# Patient Record
Sex: MALE | Race: WHITE | NOT HISPANIC OR LATINO | Employment: UNEMPLOYED | URBAN - METROPOLITAN AREA
[De-identification: names, ages, dates, MRNs, and addresses within clinical notes are randomized per-mention and may not be internally consistent; named-entity substitution may affect disease eponyms.]

---

## 2023-06-28 ENCOUNTER — OFFICE VISIT (OUTPATIENT)
Dept: PRIMARY CARE | Facility: CLINIC | Age: 33
End: 2023-06-28
Payer: COMMERCIAL

## 2023-06-28 DIAGNOSIS — E87.1 HYPONATREMIA: Primary | ICD-10-CM

## 2023-06-28 PROCEDURE — 99213 OFFICE O/P EST LOW 20 MIN: CPT | Performed by: STUDENT IN AN ORGANIZED HEALTH CARE EDUCATION/TRAINING PROGRAM

## 2023-06-28 PROCEDURE — 84300 ASSAY OF URINE SODIUM: CPT

## 2023-06-28 PROCEDURE — 82570 ASSAY OF URINE CREATININE: CPT

## 2023-06-28 PROCEDURE — 80069 RENAL FUNCTION PANEL: CPT

## 2023-06-28 PROCEDURE — 81003 URINALYSIS AUTO W/O SCOPE: CPT

## 2023-06-28 NOTE — PROGRESS NOTES
Subjective   Patient ID: Hussain Villafana is a 33 y.o. male with bipolar I disorder and history of ALL who presents to [facility].  HPI  Concerns today:  #Hyponatremia, hypokalemia   -Seen on most recent labs on 6/26-- Na of 127, K of 3  -Drinks a lot of water, takes supplements including preworkout and creatine   -Denies confusion, muscle cramping    #Recent dizzy episode  -On Monday night, he felt dizzy, short of breath, and had worsening chest pain  -EMS found high blood pressure 160 systolic--> 148 on repeat  -EKG normal    Chronic issues:  #Bipolar  -olanzapine 15mg qhs   -risperdal 1mg at hs  -topiramate 200mg at hs  -trazodone 100mg at hs/needs to use only about 2x/week- every once in a while  -Yany Raphael has been psychiatrist, states that she is leaving so will need new one     #History of ALL  -Diagnosed at age 6  - End of therapy December 05, 2000 treated with Standard Risk - [facility] [place] 1952 (chemo included anthracycline)  -Recently seen in survivorship clinic for first time since high school on 6/21/23    #Chest pain   -Echo scheduled for 7/12/23  -Chronic chest pain for 13 years  -Worse recently  -Happens 3 times a week for a couple hours  -Located in left side of chest, feels like a sharp knife    Social history:  Eats healthy   Drinks 2 gallons of water a day  Works out 5 days a week- boxing, runs, and lifts  Was taking creatine supplement starting 3-4 weeks ago, stopped a couple days ago  400mg of caffeine a day (preworkout) 2 cups of coffee in the morning  Uses nicotine pouch-- thinking about quitting, previously smoked cigarettes  Working at [employer]   Recovering alcoholic   No drug use    Family history:  -Father with HTN and HLD    Objective   Visit Vitals  /88   Pulse 55   Ht 1.829 m (6')   Wt 84.4 kg (186 lb)   SpO2 100%   BMI 25.23 kg/m²   Smoking Status Former   BSA 2.07 m²      Physical Exam  General: Well appearing fit young man, in no acute distress  HEENT: EOMI,  PERRL, nares patent without congestion, MMM  CV: RRR, no murmurs, no chest wall tenderness to palpation  Resp: Lungs CTAB, normal work of breathing  GI: Soft, nondistended, nontender, BS+   Ext: No lower ext swelling  Skin: Warm, dry, no rashes  Neuro: Awake, alert, oriented x3, moving all 4 extremities, nonfocal, normal gait, ambulates without assistance  Psych: Appropriate, blunted    Assessment/Plan   Hussain Villafana is a 32 y.o. male with bipolar I disorder, chronic chest pain, and history of ALL who presents to [facility]. He has had recent electrolyte abnormalities likely due to polydipsia. Discussed cutting down water intake from 2 gallons to 1 gallon a day. Will recheck electrolytes today. He is following with onc- cardiology for chronic chest pain. Plan for follow up in 2-3 weeks.    Problem List Items Addressed This Visit    None  Visit Diagnoses       Hyponatremia    -  Primary    Relevant Orders    Renal function panel (Completed)    Urinalysis with Reflex Microscopic (Completed)    Sodium, urine, random (Completed)                 Grace Bui MD MPH

## 2023-06-28 NOTE — PATIENT INSTRUCTIONS
Thank you for coming today.    Please get your blood work and urine studies done.    Please get a blood pressure cuff that goes over your bicep.    I will see you in 2-3 weeks!!

## 2023-06-29 LAB
ALBUMIN (G/DL) IN SER/PLAS: 5 G/DL (ref 3.4–5)
ANION GAP IN SER/PLAS: 14 MMOL/L (ref 10–20)
APPEARANCE, URINE: CLEAR
BILIRUBIN, URINE: NEGATIVE
BLOOD, URINE: NEGATIVE
CALCIUM (MG/DL) IN SER/PLAS: 10.3 MG/DL (ref 8.6–10.6)
CARBON DIOXIDE, TOTAL (MMOL/L) IN SER/PLAS: 25 MMOL/L (ref 21–32)
CHLORIDE (MMOL/L) IN SER/PLAS: 100 MMOL/L (ref 98–107)
COLOR, URINE: ABNORMAL
CREATININE (MG/DL) IN SER/PLAS: 1.11 MG/DL (ref 0.5–1.3)
CREATININE (MG/DL) IN URINE: 13.2 MG/DL (ref 20–370)
GFR MALE: 89 ML/MIN/1.73M2
GLUCOSE (MG/DL) IN SER/PLAS: 95 MG/DL (ref 74–99)
GLUCOSE, URINE: NEGATIVE MG/DL
KETONES, URINE: NEGATIVE MG/DL
LEUKOCYTE ESTERASE, URINE: NEGATIVE
NITRITE, URINE: NEGATIVE
PH, URINE: 8 (ref 5–8)
PHOSPHATE (MG/DL) IN SER/PLAS: 3.1 MG/DL (ref 2.5–4.9)
POTASSIUM (MMOL/L) IN SER/PLAS: 4 MMOL/L (ref 3.5–5.3)
PROTEIN, URINE: NEGATIVE MG/DL
SODIUM (MMOL/L) IN SER/PLAS: 135 MMOL/L (ref 136–145)
SODIUM URINE RANDOM: 17 MMOL/L
SODIUM/CREATININE (MMOL/G) IN URINE: 129 MMOL/G CREAT
SPECIFIC GRAVITY, URINE: 1 (ref 1–1.03)
UREA NITROGEN (MG/DL) IN SER/PLAS: 7 MG/DL (ref 6–23)
UROBILINOGEN, URINE: <2 MG/DL (ref 0–1.9)

## 2023-07-07 PROBLEM — R06.09 DYSPNEA ON EXERTION: Status: RESOLVED | Noted: 2023-07-07 | Resolved: 2023-07-07

## 2023-07-07 PROBLEM — R10.32 CONTINUOUS LLQ ABDOMINAL PAIN: Status: RESOLVED | Noted: 2023-07-07 | Resolved: 2023-07-07

## 2023-07-07 PROBLEM — C91.00 ALL (ACUTE LYMPHOBLASTIC LEUKEMIA) (MULTI): Status: ACTIVE | Noted: 2022-07-05

## 2023-07-07 PROBLEM — F17.200 NICOTINE DEPENDENCE: Status: ACTIVE | Noted: 2018-03-07

## 2023-07-07 PROBLEM — L72.0 EPIDERMOID CYST OF NECK: Status: RESOLVED | Noted: 2022-07-05 | Resolved: 2023-07-07

## 2023-07-07 PROBLEM — F31.70: Status: ACTIVE | Noted: 2018-03-07

## 2023-07-07 PROBLEM — Q66.70 CAVUS DEFORMITY OF FOOT: Status: ACTIVE | Noted: 2022-07-05

## 2023-07-07 PROBLEM — F51.04 CHRONIC INSOMNIA: Status: ACTIVE | Noted: 2022-07-10

## 2023-07-07 PROBLEM — R07.89 ATYPICAL CHEST PAIN: Status: ACTIVE | Noted: 2018-01-26

## 2023-07-07 PROBLEM — N41.1 CHRONIC PROSTATITIS: Status: RESOLVED | Noted: 2019-07-12 | Resolved: 2023-07-07

## 2023-07-07 PROBLEM — K46.9 ABDOMINAL HERNIA: Status: RESOLVED | Noted: 2022-07-05 | Resolved: 2023-07-07

## 2023-07-07 PROBLEM — R91.1 SOLITARY PULMONARY NODULE: Status: ACTIVE | Noted: 2022-07-05

## 2023-07-07 PROBLEM — K46.0 INCARCERATED HERNIA: Status: RESOLVED | Noted: 2023-07-07 | Resolved: 2023-07-07

## 2023-07-07 PROBLEM — F41.1 GENERALIZED ANXIETY DISORDER: Status: RESOLVED | Noted: 2018-03-05 | Resolved: 2023-07-07

## 2023-07-07 RX ORDER — TRAZODONE HYDROCHLORIDE 100 MG/1
1 TABLET ORAL DAILY PRN
COMMUNITY
Start: 2018-08-24 | End: 2024-01-30 | Stop reason: HOSPADM

## 2023-07-07 RX ORDER — EPINEPHRINE 0.22MG
100 AEROSOL WITH ADAPTER (ML) INHALATION
COMMUNITY
End: 2024-01-30 | Stop reason: HOSPADM

## 2023-07-07 RX ORDER — RISPERIDONE 1 MG/1
1 TABLET ORAL NIGHTLY
COMMUNITY
End: 2024-01-30 | Stop reason: HOSPADM

## 2023-07-07 RX ORDER — TOPIRAMATE 200 MG/1
200 TABLET ORAL DAILY
COMMUNITY
End: 2024-04-24 | Stop reason: SDUPTHER

## 2023-07-07 RX ORDER — METOPROLOL SUCCINATE 25 MG/1
25 TABLET, EXTENDED RELEASE ORAL 2 TIMES DAILY
COMMUNITY
Start: 2023-06-14 | End: 2024-01-30 | Stop reason: HOSPADM

## 2023-07-07 RX ORDER — OLANZAPINE 15 MG/1
15 TABLET ORAL NIGHTLY
COMMUNITY
End: 2024-02-15 | Stop reason: WASHOUT

## 2023-07-07 RX ORDER — ACETAMINOPHEN 500 MG
5000 TABLET ORAL
COMMUNITY
End: 2024-01-30 | Stop reason: HOSPADM

## 2023-07-08 VITALS
SYSTOLIC BLOOD PRESSURE: 126 MMHG | HEIGHT: 72 IN | HEART RATE: 55 BPM | DIASTOLIC BLOOD PRESSURE: 88 MMHG | BODY MASS INDEX: 25.19 KG/M2 | OXYGEN SATURATION: 100 % | WEIGHT: 186 LBS

## 2023-07-11 ENCOUNTER — PATIENT OUTREACH (OUTPATIENT)
Dept: PRIMARY CARE | Facility: CLINIC | Age: 33
End: 2023-07-11
Payer: COMMERCIAL

## 2023-07-18 ENCOUNTER — OFFICE VISIT (OUTPATIENT)
Dept: PRIMARY CARE | Facility: CLINIC | Age: 33
End: 2023-07-18
Payer: COMMERCIAL

## 2023-07-18 VITALS
OXYGEN SATURATION: 100 % | HEART RATE: 63 BPM | SYSTOLIC BLOOD PRESSURE: 128 MMHG | WEIGHT: 184 LBS | DIASTOLIC BLOOD PRESSURE: 80 MMHG | BODY MASS INDEX: 24.95 KG/M2

## 2023-07-18 DIAGNOSIS — E87.1 HYPONATREMIA: Primary | ICD-10-CM

## 2023-07-18 LAB
ANION GAP IN SER/PLAS: 15 MMOL/L (ref 10–20)
C REACTIVE PROTEIN (MG/L) IN SER/PLAS BY HIGH SENSIT: 0.4 MG/L
CALCIUM (MG/DL) IN SER/PLAS: 10 MG/DL (ref 8.6–10.6)
CARBON DIOXIDE, TOTAL (MMOL/L) IN SER/PLAS: 23 MMOL/L (ref 21–32)
CHLORIDE (MMOL/L) IN SER/PLAS: 105 MMOL/L (ref 98–107)
CHOLESTEROL (MG/DL) IN SER/PLAS: 188 MG/DL (ref 0–199)
CHOLESTEROL IN HDL (MG/DL) IN SER/PLAS: 78.1 MG/DL
CHOLESTEROL/HDL RATIO: 2.4
CREATININE (MG/DL) IN SER/PLAS: 1.08 MG/DL (ref 0.5–1.3)
GFR MALE: >90 ML/MIN/1.73M2
GLUCOSE (MG/DL) IN SER/PLAS: 98 MG/DL (ref 74–99)
LDL: 99 MG/DL (ref 0–99)
NATRIURETIC PEPTIDE B (PG/ML) IN SER/PLAS: 8 PG/ML (ref 0–99)
POTASSIUM (MMOL/L) IN SER/PLAS: 4.6 MMOL/L (ref 3.5–5.3)
RHEUMATOID FACTOR (IU/ML) IN SERUM OR PLASMA: <10 IU/ML (ref 0–15)
SEDIMENTATION RATE, ERYTHROCYTE: NORMAL
SODIUM (MMOL/L) IN SER/PLAS: 138 MMOL/L (ref 136–145)
TRIGLYCERIDE (MG/DL) IN SER/PLAS: 55 MG/DL (ref 0–149)
UREA NITROGEN (MG/DL) IN SER/PLAS: 10 MG/DL (ref 6–23)
VLDL: 11 MG/DL (ref 0–40)

## 2023-07-18 PROCEDURE — 3008F BODY MASS INDEX DOCD: CPT | Performed by: STUDENT IN AN ORGANIZED HEALTH CARE EDUCATION/TRAINING PROGRAM

## 2023-07-18 PROCEDURE — 99213 OFFICE O/P EST LOW 20 MIN: CPT | Performed by: STUDENT IN AN ORGANIZED HEALTH CARE EDUCATION/TRAINING PROGRAM

## 2023-07-18 NOTE — PROGRESS NOTES
Subjective   Patient ID: Hussain Villafana is a 33 y.o. male with bipolar I disorder and history of ALL who presents to Follow-up.  HPI  Concerns today;  #Recent admission   Admitted to [facility] 7/7-7/8  Normal work-up aside from hyponatremia   Told he was drinking too much water  Drinking two gallons a day    #Chest pain  Continues to have 5-6/10 pain in chest after activity, happened with boxing yesterday  Saw onco-cardiologist Zoraida Cobian on 7/12/23  [facility] echo done 7/12/23: EF normal 60-65%, GLS has decreased compared to prior study- currently 16.5%    Chronic issues:  #Bipolar  -olanzapine 15mg qhs   -risperdal 1mg at hs  -topiramate 200mg at hs  -trazodone 100mg at hs/needs to use only about 2x/week- every once in a while  -Yany Raphael has been psychiatrist, states that she is leaving so will need new one     #History of ALL  -Diagnosed at age 6  - End of therapy December 05, 2000 treated with Standard Risk - [facility] 1952 (chemo included anthracycline)  -Recently seen in survivorship clinic for first time since high school on 6/21/23    #Chest pain   -Chronic chest pain for 13 years  -Worse recently  -Happens 3 times a week for a couple hours  -Located in left side of chest, feels like a sharp knife    Social history:  Eats healthy   Drinks 2 gallons of water a day  Works out 5 days a week- boxing, runs, and lifts  400mg of caffeine a day (preworkout) 2 cups of coffee in the morning-- has stopped preworkout recently   Uses nicotine pouch-- thinking about quitting, previously smoked cigarettes  Working at [employer]   Recovering alcoholic   No drug use    Family history:  -Father with HTN and HLD    Objective   Visit Vitals  /80   Pulse 63   Wt 83.5 kg (184 lb)   SpO2 100%   BMI 24.95 kg/m²   Smoking Status Former   BSA 2.06 m²      Physical Exam  General: Well appearing fit young man, in no acute distress  HEENT: EOMI, PERRL, nares patent without congestion, MMM  CV: RRR, no  murmurs, no chest wall tenderness to palpation  Resp: Lungs CTAB, normal work of breathing  GI: Soft, nondistended, nontender, BS+   Ext: No lower ext swelling  Skin: Warm, dry, no rashes  Neuro: Awake, alert, oriented x3, moving all 4 extremities, nonfocal, normal gait, ambulates without assistance  Psych: Appropriate, blunted    Assessment/Plan   Hussain Villafana is a 32 y.o. male with bipolar I disorder, chronic chest pain, and history of ALL who presents for follow-up. He was recently admitted to [facility] overnight for chest pain, diaphoresis, weakness, found to be hyponatremic due to polydipsia. We discussed cutting water intake down from 2 gallons to 1 gallon a day and putting liquid IV or a similar product split into 2 glasses of his water everyday. Discussed trialing tums for chest pain next time just to see if there is any component of reflux causing the pain. Patient already has lab orders from cardiology that I will follow up on as well.    Problem List Items Addressed This Visit    None  Visit Diagnoses       Hyponatremia    -  Primary               Grace Bui MD MPH

## 2023-07-18 NOTE — PATIENT INSTRUCTIONS
Thank you for coming today!    Please limit water intake to one gallon a day.     Please drink 1-2 beverages a day with about 500mg of sodium.    I will follow up on your labs, and we can discuss follow-up.    Please try tums for chest pain.

## 2023-07-24 ENCOUNTER — OFFICE VISIT (OUTPATIENT)
Dept: PRIMARY CARE | Facility: CLINIC | Age: 33
End: 2023-07-24
Payer: COMMERCIAL

## 2023-07-24 VITALS
WEIGHT: 189 LBS | SYSTOLIC BLOOD PRESSURE: 128 MMHG | DIASTOLIC BLOOD PRESSURE: 80 MMHG | OXYGEN SATURATION: 97 % | HEART RATE: 66 BPM | BODY MASS INDEX: 25.63 KG/M2 | TEMPERATURE: 97.9 F

## 2023-07-24 DIAGNOSIS — R11.2 NAUSEA AND VOMITING, UNSPECIFIED VOMITING TYPE: Primary | ICD-10-CM

## 2023-07-24 LAB
ALANINE AMINOTRANSFERASE (SGPT) (U/L) IN SER/PLAS: 31 U/L (ref 10–52)
ALBUMIN (G/DL) IN SER/PLAS: 4.7 G/DL (ref 3.4–5)
ALKALINE PHOSPHATASE (U/L) IN SER/PLAS: 105 U/L (ref 33–120)
ANION GAP IN SER/PLAS: 11 MMOL/L (ref 10–20)
ASPARTATE AMINOTRANSFERASE (SGOT) (U/L) IN SER/PLAS: 37 U/L (ref 9–39)
BASOPHILS (10*3/UL) IN BLOOD BY AUTOMATED COUNT: 0.03 X10E9/L (ref 0–0.1)
BASOPHILS/100 LEUKOCYTES IN BLOOD BY AUTOMATED COUNT: 0.4 % (ref 0–2)
BILIRUBIN TOTAL (MG/DL) IN SER/PLAS: 0.4 MG/DL (ref 0–1.2)
CALCIUM (MG/DL) IN SER/PLAS: 9.8 MG/DL (ref 8.6–10.6)
CARBON DIOXIDE, TOTAL (MMOL/L) IN SER/PLAS: 24 MMOL/L (ref 21–32)
CHLORIDE (MMOL/L) IN SER/PLAS: 107 MMOL/L (ref 98–107)
CREATINE KINASE (U/L) IN SER/PLAS: 556 U/L (ref 0–325)
CREATININE (MG/DL) IN SER/PLAS: 0.93 MG/DL (ref 0.5–1.3)
EOSINOPHILS (10*3/UL) IN BLOOD BY AUTOMATED COUNT: 0.08 X10E9/L (ref 0–0.7)
EOSINOPHILS/100 LEUKOCYTES IN BLOOD BY AUTOMATED COUNT: 1.1 % (ref 0–6)
ERYTHROCYTE DISTRIBUTION WIDTH (RATIO) BY AUTOMATED COUNT: 13 % (ref 11.5–14.5)
ERYTHROCYTE MEAN CORPUSCULAR HEMOGLOBIN CONCENTRATION (G/DL) BY AUTOMATED: 33.8 G/DL (ref 32–36)
ERYTHROCYTE MEAN CORPUSCULAR VOLUME (FL) BY AUTOMATED COUNT: 93 FL (ref 80–100)
ERYTHROCYTES (10*6/UL) IN BLOOD BY AUTOMATED COUNT: 4.64 X10E12/L (ref 4.5–5.9)
GFR MALE: >90 ML/MIN/1.73M2
GLUCOSE (MG/DL) IN SER/PLAS: 100 MG/DL (ref 74–99)
HEMATOCRIT (%) IN BLOOD BY AUTOMATED COUNT: 43.2 % (ref 41–52)
HEMOGLOBIN (G/DL) IN BLOOD: 14.6 G/DL (ref 13.5–17.5)
IMMATURE GRANULOCYTES/100 LEUKOCYTES IN BLOOD BY AUTOMATED COUNT: 0.3 % (ref 0–0.9)
LEUKOCYTES (10*3/UL) IN BLOOD BY AUTOMATED COUNT: 7.2 X10E9/L (ref 4.4–11.3)
LIPASE (U/L) IN SER/PLAS: 39 U/L (ref 9–82)
LYMPHOCYTES (10*3/UL) IN BLOOD BY AUTOMATED COUNT: 0.98 X10E9/L (ref 1.2–4.8)
LYMPHOCYTES/100 LEUKOCYTES IN BLOOD BY AUTOMATED COUNT: 13.6 % (ref 13–44)
MONOCYTES (10*3/UL) IN BLOOD BY AUTOMATED COUNT: 0.77 X10E9/L (ref 0.1–1)
MONOCYTES/100 LEUKOCYTES IN BLOOD BY AUTOMATED COUNT: 10.7 % (ref 2–10)
NEUTROPHILS (10*3/UL) IN BLOOD BY AUTOMATED COUNT: 5.32 X10E9/L (ref 1.2–7.7)
NEUTROPHILS/100 LEUKOCYTES IN BLOOD BY AUTOMATED COUNT: 73.9 % (ref 40–80)
NRBC (PER 100 WBCS) BY AUTOMATED COUNT: 0 /100 WBC (ref 0–0)
PLATELETS (10*3/UL) IN BLOOD AUTOMATED COUNT: 174 X10E9/L (ref 150–450)
POTASSIUM (MMOL/L) IN SER/PLAS: 4.3 MMOL/L (ref 3.5–5.3)
PROTEIN TOTAL: 7 G/DL (ref 6.4–8.2)
SODIUM (MMOL/L) IN SER/PLAS: 138 MMOL/L (ref 136–145)
UREA NITROGEN (MG/DL) IN SER/PLAS: 8 MG/DL (ref 6–23)

## 2023-07-24 PROCEDURE — 80053 COMPREHEN METABOLIC PANEL: CPT

## 2023-07-24 PROCEDURE — 83690 ASSAY OF LIPASE: CPT

## 2023-07-24 PROCEDURE — 99213 OFFICE O/P EST LOW 20 MIN: CPT | Performed by: STUDENT IN AN ORGANIZED HEALTH CARE EDUCATION/TRAINING PROGRAM

## 2023-07-24 PROCEDURE — 3008F BODY MASS INDEX DOCD: CPT | Performed by: STUDENT IN AN ORGANIZED HEALTH CARE EDUCATION/TRAINING PROGRAM

## 2023-07-24 PROCEDURE — 85025 COMPLETE CBC W/AUTO DIFF WBC: CPT

## 2023-07-24 PROCEDURE — 82550 ASSAY OF CK (CPK): CPT

## 2023-07-24 RX ORDER — ONDANSETRON 4 MG/1
4 TABLET, ORALLY DISINTEGRATING ORAL EVERY 8 HOURS PRN
Qty: 20 TABLET | Refills: 0 | Status: SHIPPED | OUTPATIENT
Start: 2023-07-24 | End: 2023-07-31
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
## 2023-07-24 NOTE — LETTER
July 24, 2023     Patient: Hussain Villafana   YOB: 1990   Date of Visit: 7/24/2023       To Whom It May Concern:    Hussain Villafana was seen in my clinic on 7/24/2023 at 10:40 am. Please excuse Hussain for his absence from work on this day due to current medical illness.     If you have any questions or concerns, please don't hesitate to call.         Sincerely,         Grace Bui MD MPH        CC: No Recipients

## 2023-07-24 NOTE — PATIENT INSTRUCTIONS
Thank you for coming today.    I think you are recovering from a viral illness. We will get labs to ensure that everything is ok.     I sent zofran to use as needed for nausea.

## 2023-08-02 ENCOUNTER — OFFICE VISIT (OUTPATIENT)
Dept: PRIMARY CARE | Facility: CLINIC | Age: 33
End: 2023-08-02
Payer: COMMERCIAL

## 2023-08-02 VITALS
WEIGHT: 188 LBS | HEART RATE: 57 BPM | SYSTOLIC BLOOD PRESSURE: 124 MMHG | DIASTOLIC BLOOD PRESSURE: 82 MMHG | OXYGEN SATURATION: 100 % | BODY MASS INDEX: 25.5 KG/M2 | TEMPERATURE: 97.8 F

## 2023-08-02 DIAGNOSIS — R11.2 NAUSEA AND VOMITING, UNSPECIFIED VOMITING TYPE: Primary | ICD-10-CM

## 2023-08-02 LAB — MONONUCLEOSIS SCREEN: NEGATIVE

## 2023-08-02 PROCEDURE — 86308 HETEROPHILE ANTIBODY SCREEN: CPT

## 2023-08-02 PROCEDURE — 99213 OFFICE O/P EST LOW 20 MIN: CPT | Performed by: STUDENT IN AN ORGANIZED HEALTH CARE EDUCATION/TRAINING PROGRAM

## 2023-08-02 PROCEDURE — 82550 ASSAY OF CK (CPK): CPT

## 2023-08-02 PROCEDURE — 3008F BODY MASS INDEX DOCD: CPT | Performed by: STUDENT IN AN ORGANIZED HEALTH CARE EDUCATION/TRAINING PROGRAM

## 2023-08-02 PROCEDURE — 80048 BASIC METABOLIC PNL TOTAL CA: CPT

## 2023-08-02 NOTE — PROGRESS NOTES
Subjective   Patient ID: Hussain Villafana is a 33 y.o. male who presents for Vomiting and Muscle Pain.    HPI  States this is his 11th day of nausea, muscle aches, and vomiting. Vomits once a day. Also nasal congestion, dry throat, cough. Has not gotten better since he was seen in clinic on 7/24.     Denies fevers, chills, SOB. No diarrhea.     Still eating food and drinking water.     Objective   Visit Vitals  /82   Pulse 57   Temp 36.6 °C (97.8 °F)   Wt 85.3 kg (188 lb)   SpO2 100%   BMI 25.50 kg/m²   Smoking Status Former   BSA 2.08 m²      Physical Exam  General: Well appearing, conversational, in no acute distress  HEENT: EOMI, PERRL, mild nasal congestion, MMM  Neck: No lymphadenopathy  CV: RRR, no murmurs  Resp: Lungs CTAB, normal work of breathing  GI: Soft, nondistended, nontender, BS+   Ext: No lower ext swelling  Skin: Warm, dry, no rashes  Neuro: Awake, alert, oriented x3, moving all 4 extremities, nonfocal, normal gait, ambulates without assistance  Psych: Appropriate mood and affect      Assessment/Plan   Hussain Villafana is a 33 y.o. male who presents for Vomiting and Muscle Pain in the setting of recent viral illness. He is most likely still recovering, however his CK was mildly elevated, so will check CK and BMP to make sure he is not developing rhabdo. Will also check a mono test. Continue supportive care.   Problem List Items Addressed This Visit    None  Visit Diagnoses       Nausea and vomiting, unspecified vomiting type    -  Primary    Relevant Orders    Basic Metabolic Panel (Completed)    Mononucleosis screen (Completed)    CK (Completed)                 Grace Bui MD MPH

## 2023-08-03 LAB
ANION GAP IN SER/PLAS: 15 MMOL/L (ref 10–20)
CALCIUM (MG/DL) IN SER/PLAS: 10.3 MG/DL (ref 8.6–10.6)
CARBON DIOXIDE, TOTAL (MMOL/L) IN SER/PLAS: 25 MMOL/L (ref 21–32)
CHLORIDE (MMOL/L) IN SER/PLAS: 98 MMOL/L (ref 98–107)
CREATINE KINASE (U/L) IN SER/PLAS: 229 U/L (ref 0–325)
CREATININE (MG/DL) IN SER/PLAS: 0.8 MG/DL (ref 0.5–1.3)
GFR MALE: >90 ML/MIN/1.73M2
GLUCOSE (MG/DL) IN SER/PLAS: 89 MG/DL (ref 74–99)
POTASSIUM (MMOL/L) IN SER/PLAS: 4.7 MMOL/L (ref 3.5–5.3)
SODIUM (MMOL/L) IN SER/PLAS: 133 MMOL/L (ref 136–145)
UREA NITROGEN (MG/DL) IN SER/PLAS: 13 MG/DL (ref 6–23)

## 2023-10-09 ENCOUNTER — APPOINTMENT (OUTPATIENT)
Dept: RADIOLOGY | Facility: HOSPITAL | Age: 33
End: 2023-10-09
Payer: COMMERCIAL

## 2023-10-09 ENCOUNTER — HOSPITAL ENCOUNTER (EMERGENCY)
Facility: HOSPITAL | Age: 33
Discharge: HOME | End: 2023-10-09
Attending: EMERGENCY MEDICINE
Payer: COMMERCIAL

## 2023-10-09 VITALS
HEIGHT: 72 IN | RESPIRATION RATE: 16 BRPM | SYSTOLIC BLOOD PRESSURE: 131 MMHG | DIASTOLIC BLOOD PRESSURE: 82 MMHG | HEART RATE: 53 BPM | TEMPERATURE: 97.4 F | OXYGEN SATURATION: 99 % | WEIGHT: 185 LBS | BODY MASS INDEX: 25.06 KG/M2

## 2023-10-09 DIAGNOSIS — R07.9 ACUTE CHEST PAIN: Primary | ICD-10-CM

## 2023-10-09 LAB
ALBUMIN SERPL BCP-MCNC: 4.6 G/DL (ref 3.4–5)
ALP SERPL-CCNC: 105 U/L (ref 33–120)
ALT SERPL W P-5'-P-CCNC: 36 U/L (ref 10–52)
ANION GAP SERPL CALC-SCNC: 13 MMOL/L (ref 10–20)
AST SERPL W P-5'-P-CCNC: 40 U/L (ref 9–39)
BASOPHILS # BLD AUTO: 0.02 X10*3/UL (ref 0–0.1)
BASOPHILS NFR BLD AUTO: 0.3 %
BILIRUB SERPL-MCNC: 0.4 MG/DL (ref 0–1.2)
BUN SERPL-MCNC: 9 MG/DL (ref 6–23)
CALCIUM SERPL-MCNC: 9 MG/DL (ref 8.6–10.3)
CARDIAC TROPONIN I PNL SERPL HS: 8 NG/L (ref 0–20)
CARDIAC TROPONIN I PNL SERPL HS: 8 NG/L (ref 0–20)
CHLORIDE SERPL-SCNC: 94 MMOL/L (ref 98–107)
CO2 SERPL-SCNC: 21 MMOL/L (ref 21–32)
CREAT SERPL-MCNC: 1.04 MG/DL (ref 0.5–1.3)
EOSINOPHIL # BLD AUTO: 0.05 X10*3/UL (ref 0–0.7)
EOSINOPHIL NFR BLD AUTO: 0.8 %
ERYTHROCYTE [DISTWIDTH] IN BLOOD BY AUTOMATED COUNT: 12.2 % (ref 11.5–14.5)
GFR SERPL CREATININE-BSD FRML MDRD: >90 ML/MIN/1.73M*2
GLUCOSE SERPL-MCNC: 92 MG/DL (ref 74–99)
HCT VFR BLD AUTO: 35.6 % (ref 41–52)
HGB BLD-MCNC: 13 G/DL (ref 13.5–17.5)
IMM GRANULOCYTES # BLD AUTO: 0.02 X10*3/UL (ref 0–0.7)
IMM GRANULOCYTES NFR BLD AUTO: 0.3 % (ref 0–0.9)
LIPASE SERPL-CCNC: 52 U/L (ref 9–82)
LYMPHOCYTES # BLD AUTO: 1.5 X10*3/UL (ref 1.2–4.8)
LYMPHOCYTES NFR BLD AUTO: 23.5 %
MCH RBC QN AUTO: 31 PG (ref 26–34)
MCHC RBC AUTO-ENTMCNC: 36.5 G/DL (ref 32–36)
MCV RBC AUTO: 85 FL (ref 80–100)
MONOCYTES # BLD AUTO: 0.65 X10*3/UL (ref 0.1–1)
MONOCYTES NFR BLD AUTO: 10.2 %
NEUTROPHILS # BLD AUTO: 4.14 X10*3/UL (ref 1.2–7.7)
NEUTROPHILS NFR BLD AUTO: 64.9 %
NRBC BLD-RTO: 0 /100 WBCS (ref 0–0)
PLATELET # BLD AUTO: 176 X10*3/UL (ref 150–450)
PMV BLD AUTO: 11.1 FL (ref 7.5–11.5)
POTASSIUM SERPL-SCNC: 3.3 MMOL/L (ref 3.5–5.3)
PROT SERPL-MCNC: 6.8 G/DL (ref 6.4–8.2)
RBC # BLD AUTO: 4.19 X10*6/UL (ref 4.5–5.9)
SODIUM SERPL-SCNC: 125 MMOL/L (ref 136–145)
WBC # BLD AUTO: 6.4 X10*3/UL (ref 4.4–11.3)

## 2023-10-09 PROCEDURE — 71045 X-RAY EXAM CHEST 1 VIEW: CPT | Performed by: RADIOLOGY

## 2023-10-09 PROCEDURE — 71045 X-RAY EXAM CHEST 1 VIEW: CPT | Mod: FY

## 2023-10-09 PROCEDURE — 2580000001 HC RX 258 IV SOLUTIONS: Performed by: EMERGENCY MEDICINE

## 2023-10-09 PROCEDURE — 36415 COLL VENOUS BLD VENIPUNCTURE: CPT | Performed by: EMERGENCY MEDICINE

## 2023-10-09 PROCEDURE — 2500000004 HC RX 250 GENERAL PHARMACY W/ HCPCS (ALT 636 FOR OP/ED): Performed by: EMERGENCY MEDICINE

## 2023-10-09 PROCEDURE — 80053 COMPREHEN METABOLIC PANEL: CPT | Performed by: EMERGENCY MEDICINE

## 2023-10-09 PROCEDURE — 96360 HYDRATION IV INFUSION INIT: CPT

## 2023-10-09 PROCEDURE — 84484 ASSAY OF TROPONIN QUANT: CPT | Performed by: EMERGENCY MEDICINE

## 2023-10-09 PROCEDURE — 83690 ASSAY OF LIPASE: CPT | Performed by: EMERGENCY MEDICINE

## 2023-10-09 PROCEDURE — 99284 EMERGENCY DEPT VISIT MOD MDM: CPT | Performed by: EMERGENCY MEDICINE

## 2023-10-09 PROCEDURE — 99283 EMERGENCY DEPT VISIT LOW MDM: CPT | Mod: 25

## 2023-10-09 PROCEDURE — 85025 COMPLETE CBC W/AUTO DIFF WBC: CPT | Performed by: EMERGENCY MEDICINE

## 2023-10-09 RX ORDER — POTASSIUM CHLORIDE 20 MEQ/1
40 TABLET, EXTENDED RELEASE ORAL ONCE
Status: COMPLETED | OUTPATIENT
Start: 2023-10-09 | End: 2023-10-09

## 2023-10-09 RX ADMIN — POTASSIUM CHLORIDE 40 MEQ: 1500 TABLET, EXTENDED RELEASE ORAL at 21:18

## 2023-10-09 RX ADMIN — SODIUM CHLORIDE, SODIUM LACTATE, POTASSIUM CHLORIDE, AND CALCIUM CHLORIDE 2000 ML: 600; 310; 30; 20 INJECTION, SOLUTION INTRAVENOUS at 20:10

## 2023-10-09 ASSESSMENT — ENCOUNTER SYMPTOMS
JOINT SWELLING: 0
RHINORRHEA: 0
CHILLS: 0
PALPITATIONS: 0
NAUSEA: 0
SORE THROAT: 0
AGITATION: 0
COUGH: 0
CONFUSION: 0
HEADACHES: 0
DIZZINESS: 0
WOUND: 0
ABDOMINAL PAIN: 0
EYE DISCHARGE: 0
EYE PAIN: 0
VOMITING: 0
SHORTNESS OF BREATH: 0
DYSURIA: 0
FEVER: 0

## 2023-10-09 ASSESSMENT — HEART SCORE
HEART SCORE: 0
HISTORY: SLIGHTLY SUSPICIOUS
TROPONIN: LESS THAN OR EQUAL TO NORMAL LIMIT
AGE: <45
RISK FACTORS: NO KNOWN RISK FACTORS
ECG: NORMAL

## 2023-10-09 ASSESSMENT — PAIN DESCRIPTION - LOCATION: LOCATION: CHEST

## 2023-10-09 ASSESSMENT — PAIN SCALES - GENERAL
PAINLEVEL_OUTOF10: 3
PAINLEVEL_OUTOF10: 3

## 2023-10-09 ASSESSMENT — PAIN - FUNCTIONAL ASSESSMENT: PAIN_FUNCTIONAL_ASSESSMENT: 0-10

## 2023-10-09 NOTE — ED TRIAGE NOTES
To ED via EMS from work,  at a restaurant. At work, became nauseated, diaphoretic, developed chest pain, unable to concentrate, felt weak, felt like he was going to pass out, sat himself down on the ground. On arrival A&Ox4. States still having chest pain 3/10. Reports having same symptoms in the past and states his electrolytes were low at that time.

## 2023-10-10 NOTE — ED PROVIDER NOTES
HPI   Chief Complaint   Patient presents with    Chest Pain     Pre-syncopal          33 old male, history of psychiatric issues, intermittent chest pain over the past several years with distant leukemia but not on any active chemo or radiation type therapy.  Presents with slight confusional episode at work which is not all that unusual for him.  He then subsequently developed some vague chest pain midsternal with a burning sensation.  He felt generally weak and dizzy, had an episode where he had to lay down on the ground but there is no true syncope or loss of consciousness with it.  His manager contacted EMS who brought the patient to the ED.  Currently minimal discomfort may be 2-3 burning discomfort in the chest.  No neck arm or jaw discomfort.  No diaphoresis.  No particular nausea vomiting diarrhea constipation.  No alcohol use.  No leg swelling or pain.  Episodes of chest pain intermittently in the past with extensive cardiac evaluations.                          J[place] Coma Scale Score: 15   HEART Score: 0                Patient History   Past Medical History:   Diagnosis Date    Abdominal hernia 07/05/2022    Chronic prostatitis 07/12/2019    Continuous LLQ abdominal pain 07/07/2023    Dyspnea on exertion 07/07/2023    Epidermoid cyst of neck 07/05/2022    Generalized anxiety disorder 03/05/2018    Other chest pain 04/24/2020    Atypical chest pain    Personal history of leukemia 04/24/2020    History of lymphoid leukemia     Past Surgical History:   Procedure Laterality Date    OTHER SURGICAL HISTORY  07/16/2018    Central IV With Subcutaneous [place] Mediport Single Lumen    OTHER SURGICAL HISTORY  07/19/2021    Cyst excision    OTHER SURGICAL HISTORY  07/19/2021    Inguinal hernia repair    OTHER SURGICAL HISTORY  07/21/2021    Mass excision     No family history on file.  Social History     Tobacco Use    Smoking status: Former     Types: Cigarettes    Smokeless tobacco: Current   Substance Use  Topics    Alcohol use: Not Currently    Drug use: Never       Review of Systems   Constitutional:  Negative for chills and fever.   HENT:  Negative for rhinorrhea and sore throat.    Eyes:  Negative for pain and discharge.   Respiratory:  Negative for cough and shortness of breath.    Cardiovascular:  Positive for chest pain. Negative for palpitations and leg swelling.   Gastrointestinal:  Negative for abdominal pain, nausea and vomiting.   Genitourinary:  Negative for dysuria and urgency.   Musculoskeletal:  Negative for joint swelling.   Skin:  Negative for rash and wound.   Neurological:  Negative for dizziness and headaches.        No focal/lateralizing weakness or numbness on review   Psychiatric/Behavioral:  Negative for agitation and confusion.         Physical Exam   ED Triage Vitals   Temp Pulse Resp BP   -- -- -- --      SpO2 Temp src Heart Rate Source Patient Position   -- -- -- --      BP Location FiO2 (%)     -- --       Physical Exam  Vitals reviewed.   Constitutional:       General: He is not in acute distress.     Appearance: He is well-developed.   HENT:      Head: Normocephalic and atraumatic.      Right Ear: External ear normal.      Left Ear: External ear normal.      Nose: Nose normal.      Mouth/Throat:      Mouth: Mucous membranes are moist.      Pharynx: Oropharynx is clear.   Eyes:      Conjunctiva/sclera: Conjunctivae normal.      Pupils: Pupils are equal, round, and reactive to light.   Neck:      Vascular: No JVD.   Cardiovascular:      Rate and Rhythm: Normal rate and regular rhythm.      Pulses: Normal pulses.      Heart sounds: Normal heart sounds.      No systolic murmur is present.      No friction rub. No gallop. No S3 or S4 sounds.      Comments: Chest not markedly tender to palpation.  Pulmonary:      Effort: Pulmonary effort is normal. No accessory muscle usage or respiratory distress.      Breath sounds: Normal breath sounds.   Abdominal:      General: Abdomen is flat. There is  no distension.      Palpations: Abdomen is soft. There is no mass.      Tenderness: There is no abdominal tenderness.   Musculoskeletal:         General: Normal range of motion.      Cervical back: Normal range of motion and neck supple.      Right lower leg: No tenderness. No edema.      Left lower leg: No tenderness. No edema.   Lymphadenopathy:      Cervical: No cervical adenopathy.   Skin:     General: Skin is warm and dry.      Capillary Refill: Capillary refill takes less than 2 seconds.      Coloration: Skin is not cyanotic or pale.      Comments: No zoster rash seen   Neurological:      General: No focal deficit present.      Mental Status: He is alert. Mental status is at baseline.   Psychiatric:         Mood and Affect: Mood normal.                 ECG if performed, ordered and interpreted by ED physician:        Labs Reviewed   CBC WITH AUTO DIFFERENTIAL - Abnormal       Result Value    WBC 6.4      nRBC 0.0      RBC 4.19 (*)     Hemoglobin 13.0 (*)     Hematocrit 35.6 (*)     MCV 85      MCH 31.0      MCHC 36.5 (*)     RDW 12.2      Platelets 176      MPV 11.1      Neutrophils % 64.9      Immature Granulocytes %, Automated 0.3      Lymphocytes % 23.5      Monocytes % 10.2      Eosinophils % 0.8      Basophils % 0.3      Neutrophils Absolute 4.14      Immature Granulocytes Absolute, Automated 0.02      Lymphocytes Absolute 1.50      Monocytes Absolute 0.65      Eosinophils Absolute 0.05      Basophils Absolute 0.02     COMPREHENSIVE METABOLIC PANEL - Abnormal    Glucose 92      Sodium 125 (*)     Potassium 3.3 (*)     Chloride 94 (*)     Bicarbonate 21      Anion Gap 13      Urea Nitrogen 9      Creatinine 1.04      eGFR >90      Calcium 9.0      Albumin 4.6      Alkaline Phosphatase 105      Total Protein 6.8      AST 40 (*)     Bilirubin, Total 0.4      ALT 36     LIPASE - Normal    Lipase 52      Narrative:     Venipuncture immediately after or during the administration of Metamizole may lead to  falsely low results. Testing should be performed immediately prior to Metamizole dosing.   SERIAL TROPONIN-INITIAL - Normal    Troponin I, High Sensitivity 8      Narrative:     Less than 99th percentile of normal range cutoff-  Female and children under 18 years old <14 ng/L; Male <21 ng/L: Negative  Repeat testing should be performed if clinically indicated.     Female and children under 18 years old 14-50 ng/L; Male 21-50 ng/L:  Consistent with possible cardiac damage and possible increased clinical   risk. Serial measurements may help to assess extent of myocardial damage.     >50 ng/L: Consistent with cardiac damage, increased clinical risk and  myocardial infarction. Serial measurements may help assess extent of   myocardial damage.      NOTE: Children less than 1 year old may have higher baseline troponin   levels and results should be interpreted in conjunction with the overall   clinical context.     NOTE: Troponin I testing is performed using a different   testing methodology at [facility] than at other   [facility]. Direct result comparisons should only   be made within the same method.   SERIAL TROPONIN, 1 HOUR - Normal    Troponin I, High Sensitivity 8      Narrative:     Less than 99th percentile of normal range cutoff-  Female and children under 18 years old <14 ng/L; Male <21 ng/L: Negative  Repeat testing should be performed if clinically indicated.     Female and children under 18 years old 14-50 ng/L; Male 21-50 ng/L:  Consistent with possible cardiac damage and possible increased clinical   risk. Serial measurements may help to assess extent of myocardial damage.     >50 ng/L: Consistent with cardiac damage, increased clinical risk and  myocardial infarction. Serial measurements may help assess extent of   myocardial damage.      NOTE: Children less than 1 year old may have higher baseline troponin   levels and results should be interpreted in conjunction with the overall    clinical context.     NOTE: Troponin I testing is performed using a different   testing methodology at [facility] than at other   [facility] hospitals. Direct result comparisons should only   be made within the same method.   TROPONIN SERIES- (INITIAL, 1 HR)    Narrative:     The following orders were created for panel order Troponin I Series, High Sensitivity (0, 1 HR).  Procedure                               Abnormality         Status                     ---------                               -----------         ------                     Troponin I, High Sensiti...[[id number]]  Normal              Final result               Troponin, High Sensitivi...[[id number]]  Normal              Final result                 Please view results for these tests on the individual orders.          XR chest 1 view   Final Result   No acute cardiopulmonary process is evident.        MACRO:   None        Signed by: Sriram Blackman 10/9/2023 8:56 PM   Dictation workstation:   [id number]               ED Course & MDM     ED Medication Administration from 10/09/2023 1939 to 10/09/2023 2255         Date/Time Order Dose Route Action Action by     10/09/2023 2010 EDT lactated Ringer's bolus 2,000 mL 2,000 mL intravenous New Bag Dar, B     10/09/2023 2118 EDT potassium chloride CR (Klor-Con M20) ER tablet 40 mEq 40 mEq oral Given Dar, B                   ED Course as of 10/09/23 2255   Mon Oct 09, 2023   2047 EKG ordered interpreted by ED physician demonstrates sinus bradycardia, 56 bpm.  Normal rate rhythm and axis beyond the mild bradycardia.  No ischemic findings [KS]      ED Course User Index  [KS] Matt Howe MD MPH         Diagnoses as of 10/09/23 2255   Acute chest pain       Medical Decision Making  Patient with relatively atypical chest pain.  History of similar issues.  Benign, young, nontoxic appearance.    Evaluation will be undertaken.    Pulmonary embolism rule out criteria negative.  Do not suspect PE.   No pleuritic component, no shortness of breath cough sputum Opticyl risk factors for PE that I can elicit.      Cardiac evaluation -2 high-sensitivity troponins negative.    Reasonable for discharge.  Feels well enough.  Not likely ischemic heart disease.  No evidence of PE by clinical scoring, no evidence of pneumonia pneumothorax or obvious pericarditis.  Reasonable for discharge at this time with outpatient PCP follow-up of the symptoms.          Procedure  Procedures                 Matt Howe MD MPH  10/09/23 2014       Matt Howe MD MPH  10/09/23 [id number]

## 2023-10-16 ENCOUNTER — APPOINTMENT (OUTPATIENT)
Dept: PRIMARY CARE | Facility: CLINIC | Age: 33
End: 2023-10-16
Payer: COMMERCIAL

## 2023-10-16 ENCOUNTER — OFFICE VISIT (OUTPATIENT)
Dept: PRIMARY CARE | Facility: CLINIC | Age: 33
End: 2023-10-16
Payer: COMMERCIAL

## 2023-10-16 VITALS
SYSTOLIC BLOOD PRESSURE: 139 MMHG | OXYGEN SATURATION: 98 % | BODY MASS INDEX: 25.63 KG/M2 | DIASTOLIC BLOOD PRESSURE: 83 MMHG | HEART RATE: 56 BPM | WEIGHT: 189 LBS

## 2023-10-16 DIAGNOSIS — E87.1 HYPONATREMIA: Primary | ICD-10-CM

## 2023-10-16 PROCEDURE — 3008F BODY MASS INDEX DOCD: CPT | Performed by: STUDENT IN AN ORGANIZED HEALTH CARE EDUCATION/TRAINING PROGRAM

## 2023-10-16 PROCEDURE — 99213 OFFICE O/P EST LOW 20 MIN: CPT | Performed by: STUDENT IN AN ORGANIZED HEALTH CARE EDUCATION/TRAINING PROGRAM

## 2023-10-16 NOTE — LETTER
October 16, 2023     Patient: Hussain Villafana   YOB: 1990   Date of Visit: 10/16/2023       To Whom It May Concern:    Hussain Villafana was seen in my clinic on 10/16/2023 at 3:00 pm. Please excuse Hussain for his absence from work on this day to make the appointment. Based on my medical opinion, I recommend that he work no more than two shifts per week for the next 4 weeks.     If you have any questions or concerns, please don't hesitate to call.         Sincerely,         Grace uBi MD MPH        CC: No Recipients

## 2023-10-16 NOTE — PROGRESS NOTES
Subjective   Patient ID: Hussain Villafana is a 33 y.o. male with history of ALL, bipolar disorder, noncardiac chest pain, hyponatremia, polydipsia, who presents for ED follow-up.    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    We discussed his water intake-- he is drinking a couple liters of water at a time with only one liquid IV packet    Objective   Visit Vitals  /83 (BP Location: Right arm)   Pulse 56   Wt 85.7 kg (189 lb)   SpO2 98%   BMI 25.63 kg/m²   Smoking Status Former   BSA 2.09 m²      Physical Exam  General: Well appearing, conversational, in no acute distress  HEENT: EOMI, PERRL, MMM  Neck: No lymphadenopathy  CV: RRR, no murmurs  Resp: Lungs CTAB, normal work of breathing  GI: Soft, nondistended  Ext: No lower ext swelling  Skin: Warm, dry, no rashes  Neuro: Awake, alert, oriented x3, moving all 4 extremities, nonfocal, normal gait, ambulates without assistance  Psych: Appropriate mood and affect      Assessment/Plan    Hussain Villafana is a 33 y.o. male with history of ALL, bipolar disorder, noncardiac chest pain, hyponatremia, polydipsia, who presents for ED follow-up for dizziness and chest discomfort found to be hyponatremic to 125 with reassuring cardiac  work-up.    -We discussed using 1 liquid IV packet for every 16 oz of water for better electrolyte balance  -Will get blood work next week to see if this new strategy helps with sodium  -Will write note to decrease work hours for a couple weeks while he adjusts    Problem List Items Addressed This Visit    None  Visit Diagnoses       Hyponatremia    -  Primary    Relevant Orders    Comprehensive Metabolic Panel (Completed)               Grace Bui MD MPH

## 2023-10-16 NOTE — PATIENT INSTRUCTIONS
Thank you for coming today!    Please get your blood work drawn next week to see where your electrolytes are.    We discussed using a liquid IV packet for every 16 ounces of water. That means two packets for every 1 liter of water.    Good luck with your interview!

## 2023-10-17 ENCOUNTER — APPOINTMENT (OUTPATIENT)
Dept: BEHAVIORAL HEALTH | Facility: CLINIC | Age: 33
End: 2023-10-17
Payer: COMMERCIAL

## 2023-10-24 ENCOUNTER — LAB (OUTPATIENT)
Dept: LAB | Facility: LAB | Age: 33
End: 2023-10-24
Payer: COMMERCIAL

## 2023-10-24 DIAGNOSIS — E87.1 HYPONATREMIA: ICD-10-CM

## 2023-10-24 LAB
ALBUMIN SERPL BCP-MCNC: 3.9 G/DL (ref 3.4–5)
ALP SERPL-CCNC: 93 U/L (ref 33–120)
ALT SERPL W P-5'-P-CCNC: 28 U/L (ref 10–52)
ANION GAP SERPL CALC-SCNC: 13 MMOL/L (ref 10–20)
AST SERPL W P-5'-P-CCNC: 34 U/L (ref 9–39)
BILIRUB SERPL-MCNC: 0.2 MG/DL (ref 0–1.2)
BUN SERPL-MCNC: 11 MG/DL (ref 6–23)
CALCIUM SERPL-MCNC: 8.7 MG/DL (ref 8.6–10.3)
CHLORIDE SERPL-SCNC: 111 MMOL/L (ref 98–107)
CO2 SERPL-SCNC: 19 MMOL/L (ref 21–32)
CREAT SERPL-MCNC: 1.08 MG/DL (ref 0.5–1.3)
GFR SERPL CREATININE-BSD FRML MDRD: >90 ML/MIN/1.73M*2
GLUCOSE SERPL-MCNC: 81 MG/DL (ref 74–99)
POTASSIUM SERPL-SCNC: 4.3 MMOL/L (ref 3.5–5.3)
PROT SERPL-MCNC: 6.3 G/DL (ref 6.4–8.2)
SODIUM SERPL-SCNC: 139 MMOL/L (ref 136–145)

## 2023-10-24 PROCEDURE — 36415 COLL VENOUS BLD VENIPUNCTURE: CPT

## 2023-10-24 PROCEDURE — 80053 COMPREHEN METABOLIC PANEL: CPT

## 2023-12-19 ENCOUNTER — HOSPITAL ENCOUNTER (EMERGENCY)
Facility: HOSPITAL | Age: 33
Discharge: HOME | End: 2023-12-20
Payer: COMMERCIAL

## 2023-12-19 VITALS
OXYGEN SATURATION: 98 % | DIASTOLIC BLOOD PRESSURE: 87 MMHG | TEMPERATURE: 96.3 F | BODY MASS INDEX: 25.73 KG/M2 | HEART RATE: 61 BPM | SYSTOLIC BLOOD PRESSURE: 147 MMHG | RESPIRATION RATE: 16 BRPM | WEIGHT: 190 LBS | HEIGHT: 72 IN

## 2023-12-19 PROCEDURE — 4500999001 HC ED NO CHARGE

## 2023-12-19 ASSESSMENT — COLUMBIA-SUICIDE SEVERITY RATING SCALE - C-SSRS
1. IN THE PAST MONTH, HAVE YOU WISHED YOU WERE DEAD OR WISHED YOU COULD GO TO SLEEP AND NOT WAKE UP?: NO
6. HAVE YOU EVER DONE ANYTHING, STARTED TO DO ANYTHING, OR PREPARED TO DO ANYTHING TO END YOUR LIFE?: NO
2. HAVE YOU ACTUALLY HAD ANY THOUGHTS OF KILLING YOURSELF?: NO

## 2024-01-12 ENCOUNTER — LAB REQUISITION (OUTPATIENT)
Dept: LAB | Facility: HOSPITAL | Age: 34
End: 2024-01-12
Payer: COMMERCIAL

## 2024-01-12 DIAGNOSIS — A09 INFECTIOUS GASTROENTERITIS AND COLITIS, UNSPECIFIED: ICD-10-CM

## 2024-01-12 PROCEDURE — 87329 GIARDIA AG IA: CPT

## 2024-01-12 PROCEDURE — 87328 CRYPTOSPORIDIUM AG IA: CPT

## 2024-01-12 PROCEDURE — 87506 IADNA-DNA/RNA PROBE TQ 6-11: CPT

## 2024-01-13 LAB

## 2024-01-15 ENCOUNTER — HOSPITAL ENCOUNTER (OUTPATIENT)
Dept: RADIOLOGY | Facility: EXTERNAL LOCATION | Age: 34
Discharge: HOME | End: 2024-01-15

## 2024-01-15 ENCOUNTER — LAB REQUISITION (OUTPATIENT)
Dept: LAB | Facility: HOSPITAL | Age: 34
End: 2024-01-15
Payer: COMMERCIAL

## 2024-01-15 DIAGNOSIS — R30.0 DYSURIA: ICD-10-CM

## 2024-01-15 DIAGNOSIS — R10.84 GENERALIZED ABDOMINAL PAIN: ICD-10-CM

## 2024-01-15 PROCEDURE — 87086 URINE CULTURE/COLONY COUNT: CPT

## 2024-01-16 LAB
CRYPTOSP AG STL QL IA: NEGATIVE
G LAMBLIA AG STL QL IA: NEGATIVE

## 2024-01-17 LAB — BACTERIA UR CULT: NO GROWTH

## 2024-01-18 ENCOUNTER — OFFICE VISIT (OUTPATIENT)
Dept: PRIMARY CARE | Facility: CLINIC | Age: 34
End: 2024-01-18
Payer: COMMERCIAL

## 2024-01-18 ENCOUNTER — LAB (OUTPATIENT)
Dept: LAB | Facility: LAB | Age: 34
End: 2024-01-18
Payer: COMMERCIAL

## 2024-01-18 VITALS
WEIGHT: 194 LBS | TEMPERATURE: 97.9 F | SYSTOLIC BLOOD PRESSURE: 140 MMHG | BODY MASS INDEX: 26.31 KG/M2 | OXYGEN SATURATION: 97 % | DIASTOLIC BLOOD PRESSURE: 100 MMHG | HEART RATE: 63 BPM

## 2024-01-18 DIAGNOSIS — R11.0 NAUSEA: Primary | ICD-10-CM

## 2024-01-18 DIAGNOSIS — R11.0 NAUSEA: ICD-10-CM

## 2024-01-18 LAB
ALBUMIN SERPL BCP-MCNC: 5 G/DL (ref 3.4–5)
ALP SERPL-CCNC: 104 U/L (ref 33–120)
ALT SERPL W P-5'-P-CCNC: 31 U/L (ref 10–52)
ANION GAP SERPL CALC-SCNC: 17 MMOL/L (ref 10–20)
AST SERPL W P-5'-P-CCNC: 29 U/L (ref 9–39)
BILIRUB SERPL-MCNC: 0.5 MG/DL (ref 0–1.2)
BUN SERPL-MCNC: 11 MG/DL (ref 6–23)
CALCIUM SERPL-MCNC: 10 MG/DL (ref 8.6–10.6)
CHLORIDE SERPL-SCNC: 102 MMOL/L (ref 98–107)
CO2 SERPL-SCNC: 23 MMOL/L (ref 21–32)
CREAT SERPL-MCNC: 1.03 MG/DL (ref 0.5–1.3)
CRP SERPL-MCNC: <0.1 MG/DL
EGFRCR SERPLBLD CKD-EPI 2021: >90 ML/MIN/1.73M*2
ERYTHROCYTE [DISTWIDTH] IN BLOOD BY AUTOMATED COUNT: 12.7 % (ref 11.5–14.5)
ERYTHROCYTE [SEDIMENTATION RATE] IN BLOOD BY WESTERGREN METHOD: 4 MM/H (ref 0–15)
GLUCOSE SERPL-MCNC: 98 MG/DL (ref 74–99)
HCT VFR BLD AUTO: 42.8 % (ref 41–52)
HGB BLD-MCNC: 14.8 G/DL (ref 13.5–17.5)
LIPASE SERPL-CCNC: 41 U/L (ref 9–82)
MCH RBC QN AUTO: 31 PG (ref 26–34)
MCHC RBC AUTO-ENTMCNC: 34.6 G/DL (ref 32–36)
MCV RBC AUTO: 90 FL (ref 80–100)
NRBC BLD-RTO: 0 /100 WBCS (ref 0–0)
PLATELET # BLD AUTO: 200 X10*3/UL (ref 150–450)
POTASSIUM SERPL-SCNC: 4.3 MMOL/L (ref 3.5–5.3)
PROT SERPL-MCNC: 7.6 G/DL (ref 6.4–8.2)
RBC # BLD AUTO: 4.78 X10*6/UL (ref 4.5–5.9)
SODIUM SERPL-SCNC: 138 MMOL/L (ref 136–145)
TSH SERPL-ACNC: 1 MIU/L (ref 0.44–3.98)
WBC # BLD AUTO: 6 X10*3/UL (ref 4.4–11.3)

## 2024-01-18 PROCEDURE — 36415 COLL VENOUS BLD VENIPUNCTURE: CPT

## 2024-01-18 PROCEDURE — 85652 RBC SED RATE AUTOMATED: CPT

## 2024-01-18 PROCEDURE — 86140 C-REACTIVE PROTEIN: CPT

## 2024-01-18 PROCEDURE — 85027 COMPLETE CBC AUTOMATED: CPT

## 2024-01-18 PROCEDURE — 83690 ASSAY OF LIPASE: CPT

## 2024-01-18 PROCEDURE — 3008F BODY MASS INDEX DOCD: CPT | Performed by: STUDENT IN AN ORGANIZED HEALTH CARE EDUCATION/TRAINING PROGRAM

## 2024-01-18 PROCEDURE — 84443 ASSAY THYROID STIM HORMONE: CPT

## 2024-01-18 PROCEDURE — 99213 OFFICE O/P EST LOW 20 MIN: CPT | Performed by: STUDENT IN AN ORGANIZED HEALTH CARE EDUCATION/TRAINING PROGRAM

## 2024-01-18 PROCEDURE — 80053 COMPREHEN METABOLIC PANEL: CPT

## 2024-01-18 RX ORDER — ONDANSETRON 4 MG/1
4 TABLET, FILM COATED ORAL EVERY 8 HOURS PRN
Qty: 20 TABLET | Refills: 0 | Status: SHIPPED | OUTPATIENT
Start: 2024-01-18 | End: 2024-01-30 | Stop reason: HOSPADM

## 2024-01-18 NOTE — PROGRESS NOTES
Subjective   Patient ID: Hussain Villafana is a 33 y.o. male with history of ALL and bipolar disorder who presents for Diarrhea and Vomiting.    HPI  Symptoms started 12/28/23  Had diarrhea, vomiting, nausea for 3 days  Started feeling better on 12/31, but still having diarrhea   Last Monday on 1/8, had 8 BMs with diarrhea  Went to urgent care on 1/9-- was treated with azithromycin, dicyclomine and PPI for 3 days  He sent me a message on 1/11 that these medications were not helping  He went back to urgent care on 1/12, did a stool sample which was normal   Went back to urgent care on Monday 1/15, x-ray which showed nonobstructive pattern with stool throughout colon    Currently:  He is vomiting a few times a week and still having nausea  Really bad diarrhea the last few days, a few firm Bms   Tried prune juice due to x-ray and did not help  Still exercising, states he ran 3 miles in 30 minutes which is not as good for him    Still has an appetite and still drinking fluids   Eating a lot of fruits (fresh and dried) for lunch    Objective   Visit Vitals  BP (!) 140/100   Pulse 63   Temp 36.6 °C (97.9 °F)   Wt 88 kg (194 lb)   SpO2 97%   BMI 26.31 kg/m²   Smoking Status Former   BSA 2.11 m²      Physical Exam  General: Well appearing, conversational, in no acute distress  HEENT: EOMI, PERRL, nares patent without congestion, MMM, TMs clear bilaterally   CV: RRR, no murmurs  Resp: Lungs CTAB, normal work of breathing  GI: Soft, nondistended, nontender, BS+   Ext: No lower ext swelling  Skin: Warm, dry, no rashes  Neuro: Awake, alert, oriented x3, moving all 4 extremities, nonfocal, normal gait, ambulates without assistance  Psych: Appropriate mood and affect      shaking    Assessment/Plan   Hussain Villafana is a 33 y.o. male who presents for Diarrhea and Vomiting. Most likely constipated with encopresis as his KUB shows a lot of stool throughout his colon. Will also get some bloodwork to rule out other causes  of diarrhea. If normal, will recommend miralax to help relieve constipation. Also prescribed zofran PRN.    Problem List Items Addressed This Visit    None  Visit Diagnoses       Nausea    -  Primary    Relevant Medications    ondansetron (Zofran) 4 mg tablet    Other Relevant Orders    CBC (Completed)    Comprehensive Metabolic Panel (Completed)    Lipase (Completed)    Sedimentation Rate (Completed)    TSH with reflex to Free T4 if abnormal (Completed)    C-reactive protein (Completed)                 Grace Bui MD MPH

## 2024-01-18 NOTE — PATIENT INSTRUCTIONS
Thank you for coming today Kirby!    I think you might be having constipation with encopresis which is when liquid stool goes around a large stool ball in your colon.    Let's get blood work first and then if things look ok, we can try miralax (polyethylene glycol) to help clear stool out.    You can try zofran as needed for nausea as well.

## 2024-01-22 DIAGNOSIS — K59.00 CONSTIPATION, UNSPECIFIED CONSTIPATION TYPE: ICD-10-CM

## 2024-01-22 DIAGNOSIS — R11.2 NAUSEA AND VOMITING, UNSPECIFIED VOMITING TYPE: Primary | ICD-10-CM

## 2024-01-25 ENCOUNTER — HOSPITAL ENCOUNTER (OUTPATIENT)
Dept: RADIOLOGY | Facility: CLINIC | Age: 34
Discharge: HOME | End: 2024-01-25
Payer: COMMERCIAL

## 2024-01-25 DIAGNOSIS — K59.00 CONSTIPATION, UNSPECIFIED CONSTIPATION TYPE: ICD-10-CM

## 2024-01-25 DIAGNOSIS — R11.2 NAUSEA AND VOMITING, UNSPECIFIED VOMITING TYPE: ICD-10-CM

## 2024-01-25 PROCEDURE — 74018 RADEX ABDOMEN 1 VIEW: CPT

## 2024-01-25 PROCEDURE — 74018 RADEX ABDOMEN 1 VIEW: CPT | Performed by: RADIOLOGY

## 2024-01-26 ENCOUNTER — HOSPITAL ENCOUNTER (INPATIENT)
Facility: HOSPITAL | Age: 34
LOS: 4 days | Discharge: HOME | End: 2024-01-30
Attending: PSYCHIATRY & NEUROLOGY | Admitting: PSYCHIATRY & NEUROLOGY
Payer: COMMERCIAL

## 2024-01-26 ENCOUNTER — HOSPITAL ENCOUNTER (EMERGENCY)
Facility: HOSPITAL | Age: 34
Discharge: OTHER INSTITUTION | End: 2024-01-26
Attending: EMERGENCY MEDICINE
Payer: COMMERCIAL

## 2024-01-26 ENCOUNTER — APPOINTMENT (OUTPATIENT)
Dept: CARDIOLOGY | Facility: HOSPITAL | Age: 34
End: 2024-01-26
Payer: COMMERCIAL

## 2024-01-26 VITALS
HEIGHT: 72 IN | RESPIRATION RATE: 16 BRPM | HEART RATE: 72 BPM | OXYGEN SATURATION: 99 % | SYSTOLIC BLOOD PRESSURE: 128 MMHG | WEIGHT: 195 LBS | DIASTOLIC BLOOD PRESSURE: 70 MMHG | TEMPERATURE: 97.5 F | BODY MASS INDEX: 26.41 KG/M2

## 2024-01-26 DIAGNOSIS — F31.70 BIPOLAR I DISORDER IN REMISSION (MULTI): ICD-10-CM

## 2024-01-26 DIAGNOSIS — R44.0 AUDITORY HALLUCINATION: Primary | ICD-10-CM

## 2024-01-26 DIAGNOSIS — F31.9 BIPOLAR 1 DISORDER (MULTI): ICD-10-CM

## 2024-01-26 DIAGNOSIS — F25.9 SCHIZO AFFECTIVE SCHIZOPHRENIA (MULTI): Primary | ICD-10-CM

## 2024-01-26 DIAGNOSIS — R07.89 ATYPICAL CHEST PAIN: ICD-10-CM

## 2024-01-26 LAB
ALBUMIN SERPL BCP-MCNC: 4.6 G/DL (ref 3.4–5)
ALP SERPL-CCNC: 94 U/L (ref 33–120)
ALT SERPL W P-5'-P-CCNC: 37 U/L (ref 10–52)
AMPHETAMINES UR QL SCN: NORMAL
ANION GAP SERPL CALC-SCNC: 11 MMOL/L (ref 10–20)
APAP SERPL-MCNC: <10 UG/ML
APPEARANCE UR: CLEAR
AST SERPL W P-5'-P-CCNC: 49 U/L (ref 9–39)
BARBITURATES UR QL SCN: NORMAL
BASOPHILS # BLD AUTO: 0.01 X10*3/UL (ref 0–0.1)
BASOPHILS NFR BLD AUTO: 0.1 %
BENZODIAZ UR QL SCN: NORMAL
BILIRUB SERPL-MCNC: 0.6 MG/DL (ref 0–1.2)
BILIRUB UR STRIP.AUTO-MCNC: NEGATIVE MG/DL
BUN SERPL-MCNC: 10 MG/DL (ref 6–23)
BZE UR QL SCN: NORMAL
CALCIUM SERPL-MCNC: 9.6 MG/DL (ref 8.6–10.3)
CANNABINOIDS UR QL SCN: NORMAL
CHLORIDE SERPL-SCNC: 103 MMOL/L (ref 98–107)
CO2 SERPL-SCNC: 24 MMOL/L (ref 21–32)
COLOR UR: ABNORMAL
CREAT SERPL-MCNC: 0.98 MG/DL (ref 0.5–1.3)
EGFRCR SERPLBLD CKD-EPI 2021: >90 ML/MIN/1.73M*2
EOSINOPHIL # BLD AUTO: 0.03 X10*3/UL (ref 0–0.7)
EOSINOPHIL NFR BLD AUTO: 0.4 %
ERYTHROCYTE [DISTWIDTH] IN BLOOD BY AUTOMATED COUNT: 12.4 % (ref 11.5–14.5)
ETHANOL SERPL-MCNC: <10 MG/DL
FENTANYL+NORFENTANYL UR QL SCN: NORMAL
FLUAV RNA RESP QL NAA+PROBE: NOT DETECTED
FLUBV RNA RESP QL NAA+PROBE: NOT DETECTED
GLUCOSE SERPL-MCNC: 102 MG/DL (ref 74–99)
GLUCOSE UR STRIP.AUTO-MCNC: NEGATIVE MG/DL
HCT VFR BLD AUTO: 39.9 % (ref 41–52)
HGB BLD-MCNC: 13.9 G/DL (ref 13.5–17.5)
HOLD SPECIMEN: NORMAL
HOLD SPECIMEN: NORMAL
IMM GRANULOCYTES # BLD AUTO: 0.02 X10*3/UL (ref 0–0.7)
IMM GRANULOCYTES NFR BLD AUTO: 0.2 % (ref 0–0.9)
KETONES UR STRIP.AUTO-MCNC: NEGATIVE MG/DL
LEUKOCYTE ESTERASE UR QL STRIP.AUTO: NEGATIVE
LYMPHOCYTES # BLD AUTO: 0.99 X10*3/UL (ref 1.2–4.8)
LYMPHOCYTES NFR BLD AUTO: 12.3 %
MCH RBC QN AUTO: 31 PG (ref 26–34)
MCHC RBC AUTO-ENTMCNC: 34.8 G/DL (ref 32–36)
MCV RBC AUTO: 89 FL (ref 80–100)
MONOCYTES # BLD AUTO: 0.57 X10*3/UL (ref 0.1–1)
MONOCYTES NFR BLD AUTO: 7.1 %
NEUTROPHILS # BLD AUTO: 6.43 X10*3/UL (ref 1.2–7.7)
NEUTROPHILS NFR BLD AUTO: 79.9 %
NITRITE UR QL STRIP.AUTO: NEGATIVE
NRBC BLD-RTO: 0 /100 WBCS (ref 0–0)
OPIATES UR QL SCN: NORMAL
OXYCODONE+OXYMORPHONE UR QL SCN: NORMAL
PCP UR QL SCN: NORMAL
PH UR STRIP.AUTO: 7 [PH]
PLATELET # BLD AUTO: 181 X10*3/UL (ref 150–450)
POTASSIUM SERPL-SCNC: 4 MMOL/L (ref 3.5–5.3)
PROT SERPL-MCNC: 6.7 G/DL (ref 6.4–8.2)
PROT UR STRIP.AUTO-MCNC: NEGATIVE MG/DL
RBC # BLD AUTO: 4.49 X10*6/UL (ref 4.5–5.9)
RBC # UR STRIP.AUTO: NEGATIVE /UL
SALICYLATES SERPL-MCNC: <3 MG/DL
SARS-COV-2 RNA RESP QL NAA+PROBE: NOT DETECTED
SODIUM SERPL-SCNC: 134 MMOL/L (ref 136–145)
SP GR UR STRIP.AUTO: 1
TSH SERPL-ACNC: 0.68 MIU/L (ref 0.44–3.98)
UROBILINOGEN UR STRIP.AUTO-MCNC: <2 MG/DL
WBC # BLD AUTO: 8.1 X10*3/UL (ref 4.4–11.3)

## 2024-01-26 PROCEDURE — 84443 ASSAY THYROID STIM HORMONE: CPT

## 2024-01-26 PROCEDURE — 85025 COMPLETE CBC W/AUTO DIFF WBC: CPT

## 2024-01-26 PROCEDURE — 87636 SARSCOV2 & INF A&B AMP PRB: CPT

## 2024-01-26 PROCEDURE — 81003 URINALYSIS AUTO W/O SCOPE: CPT | Mod: 59

## 2024-01-26 PROCEDURE — 93005 ELECTROCARDIOGRAM TRACING: CPT

## 2024-01-26 PROCEDURE — 36415 COLL VENOUS BLD VENIPUNCTURE: CPT

## 2024-01-26 PROCEDURE — 84075 ASSAY ALKALINE PHOSPHATASE: CPT

## 2024-01-26 PROCEDURE — 99285 EMERGENCY DEPT VISIT HI MDM: CPT | Performed by: EMERGENCY MEDICINE

## 2024-01-26 PROCEDURE — S4991 NICOTINE PATCH NONLEGEND: HCPCS | Performed by: PSYCHIATRY & NEUROLOGY

## 2024-01-26 PROCEDURE — 80143 DRUG ASSAY ACETAMINOPHEN: CPT

## 2024-01-26 PROCEDURE — 1240000001 HC SEMI-PRIVATE BH ROOM DAILY

## 2024-01-26 PROCEDURE — 2500000002 HC RX 250 W HCPCS SELF ADMINISTERED DRUGS (ALT 637 FOR MEDICARE OP, ALT 636 FOR OP/ED): Performed by: PSYCHIATRY & NEUROLOGY

## 2024-01-26 PROCEDURE — 80307 DRUG TEST PRSMV CHEM ANLYZR: CPT | Performed by: EMERGENCY MEDICINE

## 2024-01-26 RX ORDER — ACETAMINOPHEN 325 MG/1
650 TABLET ORAL EVERY 6 HOURS PRN
Status: DISCONTINUED | OUTPATIENT
Start: 2024-01-26 | End: 2024-01-30 | Stop reason: HOSPADM

## 2024-01-26 RX ORDER — HYDROXYZINE HYDROCHLORIDE 25 MG/1
50 TABLET, FILM COATED ORAL EVERY 6 HOURS PRN
Status: DISCONTINUED | OUTPATIENT
Start: 2024-01-26 | End: 2024-01-30 | Stop reason: HOSPADM

## 2024-01-26 RX ORDER — ALUMINUM HYDROXIDE, MAGNESIUM HYDROXIDE, AND SIMETHICONE 1200; 120; 1200 MG/30ML; MG/30ML; MG/30ML
10 SUSPENSION ORAL 4 TIMES DAILY PRN
Status: DISCONTINUED | OUTPATIENT
Start: 2024-01-26 | End: 2024-01-30 | Stop reason: HOSPADM

## 2024-01-26 RX ORDER — IBUPROFEN 200 MG
1 TABLET ORAL DAILY
Status: DISCONTINUED | OUTPATIENT
Start: 2024-01-26 | End: 2024-01-27

## 2024-01-26 RX ORDER — OLANZAPINE 10 MG/2ML
5 INJECTION, POWDER, FOR SOLUTION INTRAMUSCULAR EVERY 6 HOURS PRN
Status: DISCONTINUED | OUTPATIENT
Start: 2024-01-26 | End: 2024-01-30 | Stop reason: HOSPADM

## 2024-01-26 RX ORDER — IBUPROFEN 600 MG/1
600 TABLET ORAL EVERY 6 HOURS PRN
Status: DISCONTINUED | OUTPATIENT
Start: 2024-01-26 | End: 2024-01-30 | Stop reason: HOSPADM

## 2024-01-26 RX ORDER — TRAZODONE HYDROCHLORIDE 50 MG/1
50 TABLET ORAL NIGHTLY PRN
Status: DISCONTINUED | OUTPATIENT
Start: 2024-01-26 | End: 2024-01-30 | Stop reason: HOSPADM

## 2024-01-26 RX ORDER — OLANZAPINE 5 MG/1
5 TABLET ORAL EVERY 6 HOURS PRN
Status: DISCONTINUED | OUTPATIENT
Start: 2024-01-26 | End: 2024-01-30 | Stop reason: HOSPADM

## 2024-01-26 RX ADMIN — NICOTINE 1 PATCH: 14 PATCH, EXTENDED RELEASE TRANSDERMAL at 20:58

## 2024-01-26 SDOH — HEALTH STABILITY: MENTAL HEALTH: HAVE YOU WISHED YOU WERE DEAD OR WISHED YOU COULD GO TO SLEEP AND NOT WAKE UP?: NO

## 2024-01-26 SDOH — SOCIAL STABILITY: SOCIAL NETWORK: EMOTIONAL SUPPORT GIVEN: REASSURE

## 2024-01-26 SDOH — HEALTH STABILITY: MENTAL HEALTH: DELUSIONS: PARANOID

## 2024-01-26 SDOH — HEALTH STABILITY: MENTAL HEALTH: NEEDS EXPRESSED: EMOTIONAL

## 2024-01-26 SDOH — HEALTH STABILITY: MENTAL HEALTH: HAVE YOU ACTUALLY HAD ANY THOUGHTS OF KILLING YOURSELF?: NO

## 2024-01-26 SDOH — HEALTH STABILITY: MENTAL HEALTH: SLEEP PATTERN: DISTURBED/INTERRUPTED SLEEP

## 2024-01-26 SDOH — HEALTH STABILITY: MENTAL HEALTH: SUICIDE ASSESSMENT: ADULT (C-SSRS)

## 2024-01-26 SDOH — HEALTH STABILITY: MENTAL HEALTH: BEHAVIORS/MOOD: CALM

## 2024-01-26 SDOH — HEALTH STABILITY: MENTAL HEALTH: IN THE PAST WEEK, HAVE YOU BEEN HAVING THOUGHTS ABOUT KILLING YOURSELF?: NO

## 2024-01-26 SDOH — ECONOMIC STABILITY: HOUSING INSECURITY: FEELS SAFE LIVING IN HOME: YES

## 2024-01-26 SDOH — HEALTH STABILITY: MENTAL HEALTH: HALLUCINATION: AUDITORY;VISUAL

## 2024-01-26 SDOH — HEALTH STABILITY: MENTAL HEALTH: NON-SPECIFIC ACTIVE SUICIDAL THOUGHTS (PAST 1 MONTH): NO

## 2024-01-26 SDOH — HEALTH STABILITY: MENTAL HEALTH: HAVE YOU EVER DONE ANYTHING, STARTED TO DO ANYTHING, OR PREPARED TO DO ANYTHING TO END YOUR LIFE?: NO

## 2024-01-26 SDOH — HEALTH STABILITY: MENTAL HEALTH: WISH TO BE DEAD (PAST 1 MONTH): NO

## 2024-01-26 SDOH — HEALTH STABILITY: MENTAL HEALTH: CONTENT: DELUSIONS

## 2024-01-26 SDOH — HEALTH STABILITY: MENTAL HEALTH: IN THE PAST FEW WEEKS, HAVE YOU FELT THAT YOU OR YOUR FAMILY WOULD BE BETTER OFF IF YOU WERE DEAD?: NO

## 2024-01-26 SDOH — HEALTH STABILITY: MENTAL HEALTH: ARE YOU HAVING THOUGHTS OF KILLING YOURSELF RIGHT NOW?: NO

## 2024-01-26 SDOH — HEALTH STABILITY: MENTAL HEALTH: ANXIETY SYMPTOMS: NO PROBLEMS REPORTED OR OBSERVED.

## 2024-01-26 SDOH — HEALTH STABILITY: MENTAL HEALTH: IN THE PAST FEW WEEKS, HAVE YOU WISHED YOU WERE DEAD?: NO

## 2024-01-26 SDOH — HEALTH STABILITY: MENTAL HEALTH: HAVE YOU EVER TRIED TO KILL YOURSELF?: NO

## 2024-01-26 SDOH — HEALTH STABILITY: MENTAL HEALTH: DEPRESSION SYMPTOMS: CHANGE IN ENERGY LEVEL;FEELINGS OF HELPLESSNESS;IMPAIRED CONCENTRATION;SLEEP DISTURBANCE

## 2024-01-26 SDOH — HEALTH STABILITY: MENTAL HEALTH: SUICIDAL BEHAVIOR (LIFETIME): NO

## 2024-01-26 ASSESSMENT — LIFESTYLE VARIABLES
REASON UNABLE TO ASSESS: NO
EVER HAD A DRINK FIRST THING IN THE MORNING TO STEADY YOUR NERVES TO GET RID OF A HANGOVER: NO
SUBSTANCE_ABUSE_PAST_12_MONTHS: NO
HAVE PEOPLE ANNOYED YOU BY CRITICIZING YOUR DRINKING: NO
HAVE YOU EVER FELT YOU SHOULD CUT DOWN ON YOUR DRINKING: NO
PRESCIPTION_ABUSE_PAST_12_MONTHS: NO
EVER FELT BAD OR GUILTY ABOUT YOUR DRINKING: NO

## 2024-01-26 ASSESSMENT — COLUMBIA-SUICIDE SEVERITY RATING SCALE - C-SSRS
2. HAVE YOU ACTUALLY HAD ANY THOUGHTS OF KILLING YOURSELF?: NO
1. IN THE PAST MONTH, HAVE YOU WISHED YOU WERE DEAD OR WISHED YOU COULD GO TO SLEEP AND NOT WAKE UP?: NO
6. HAVE YOU EVER DONE ANYTHING, STARTED TO DO ANYTHING, OR PREPARED TO DO ANYTHING TO END YOUR LIFE?: NO
6. HAVE YOU EVER DONE ANYTHING, STARTED TO DO ANYTHING, OR PREPARED TO DO ANYTHING TO END YOUR LIFE?: NO
2. HAVE YOU ACTUALLY HAD ANY THOUGHTS OF KILLING YOURSELF?: NO
1. SINCE LAST CONTACT, HAVE YOU WISHED YOU WERE DEAD OR WISHED YOU COULD GO TO SLEEP AND NOT WAKE UP?: NO

## 2024-01-26 ASSESSMENT — PAIN SCALES - GENERAL
PAINLEVEL_OUTOF10: 0 - NO PAIN
PAINLEVEL_OUTOF10: 0 - NO PAIN

## 2024-01-26 ASSESSMENT — PAIN - FUNCTIONAL ASSESSMENT
PAIN_FUNCTIONAL_ASSESSMENT: 0-10
PAIN_FUNCTIONAL_ASSESSMENT: 0-10

## 2024-01-26 NOTE — SIGNIFICANT EVENT
Application for Emergency Admission      Ready for Transfer?  Is the patient medically cleared for transfer to inpatient psychiatry: Yes  Has the patient been accepted to an inpatient psychiatric hospital: Yes    Application for Emergency Admission  IN ACCORDANCE WITH [place]10 [place]  The Chief Clinical Officer of: [facility] 1/26/2024 .4:32 PM    Reason for Hospitalization  The undersigned has reason to believe that: Hussain Villafana Is a mentally ill person subject to hospitalization by court order under division B Section 5122.01 of the Revised Code, i.e., this person:    1.no represents a substantial risk of physical harm to self as manifested by evidence of threats of, or attempts at, suicide or serious self-inflicted bodily harm    2.No Represents a substantial risk of physical harm to others as manifested by evidence of recent homicidal or other violent behavior, evidence of recent threats that place another in reasonable fear of violent behavior and serious physical harm, or other evidence of present dangerousness    3.No Represents a substantial and immediate risk of serious physical impairment or injury to self as manifested by  evidence that the person is unable to provide for and is not providing for the person's basic physical needs because of the person's mental illness and that appropriate provision for those needs cannot be made  immediately available in the community    4.Yes Would benefit from treatment in a hospital for his mental illness and is in need of such treatment as manifested by evidence of behavior that creates a grave and imminent risk to substantial rights of others or  himself.    5.Yes Would benefit from treatment as manifested by evidence of behavior that indicates all of the following:       (a) The person is unlikely to survive safely in the community without supervision, based on a clinical determination.       (b) The person has a history of lack of  compliance with treatment for mental illness and one of the following applies:      (i) At least twice within the thirty-six months prior to the filing of an affidavit seeking court-ordered treatment of the person under section 5122.111 of the Revised Code, the lack of compliance has been a significant factor in necessitating hospitalization in a hospital or receipt of services in a forensic or other mental health unit of a correctional facility, provided that the thirty-six-month period shall be extended by the length of any hospitalization or incarceration of the person that occurred within the thirty-six-month period.      (ii) Within the forty-eight months prior to the filing of an affidavit seeking court-ordered treatment of the person under section 5122.111 of the Revised Code, the lack of compliance resulted in one or more acts of serious violent behavior toward self or others or threats of, or attempts at, serious physical harm to self or others, provided that the forty-eight-month period shall be extended by the length of any hospitalization or incarceration of the person that occurred within the forty-eight-month period.      (c) The person, as a result of mental illness, is unlikely to voluntarily participate in necessary treatment.       (d) In view of the person's treatment history and current behavior, the person is in need of treatment in order to prevent a relapse or deterioration that would be likely to result in substantial risk of serious harm to the person or others.    (e) Represents a substantial risk of physical harm to self or others if allowed to remain at liberty pending examination.    Therefore, it is requested that said person be admitted to the above named facility.    STATEMENT OF BELIEF    Must be filled out by one of the following: a psychiatrist, licensed physician, licensed clinical psychologist, health or ,  or .  (Statement shall include the  circumstances under which the individual was taken into custody and the reason for the person's belief that hospitalization is necessary. The statement shall also include a reference to efforts made to secure the individual's property at his residence if he was taken into custody there. Every reasonable and appropriate effort should be made to take this person into custody in the least conspicuous manner possible.)    This is a patient with a history of bipolar disorder who is currently seeking help, he does not believe that IP psychology will be enough for him at this time.  He is voluntarily asking to be sent to a psychiatric facility for further treatment and evaluation.    Ramsey Anaya, DO 1/26/2024     _____________________________________________________________   Place of Employment: [facility].    STATEMENT OF OBSERVATION BY PSYCHIATRIST, LICENSED PHYSICIAN, OR LICENSED CLINICAL PSYCHOLOGIST, IF APPLICABLE    Place of Observation (e.g., [facility], [facility] hospital, office, emergency facility)  (If applicable, please complete)    Ramsey Anaya, DO 1/26/2024    _____________________________________________________________

## 2024-01-26 NOTE — PROGRESS NOTES
"Hussain Villafana is a 33 y.o. male on day 0 of admission presenting with No Principal Problem: There is no principal problem currently on the Problem List. Please update the Problem List and refresh..    Capacity evaluation    Capacity Assessment Tool    \"Capacity\" is the \"ability\" to make a decision.  The decision in question must be specific (one decision), relevant to a patient's current condition (appropriate), and timely (neither prospective nor retrospective).    Capacity varies based on knowledge base (explanation/understanding of clinical information), cognitive processing, acute psychiatric illness, and other clinical conditions.    In order to be deemed \"capacitated\" to make a single decision at one point in time, a patient must demonstrate all 4 of the following elements:    *Ability to consistently communicate a choice (consistent over time with adequate information)  *Ability to understand the relevant information (accurate knowledge of condition)  *Ability to appreciate the situation and its consequences (risks/benefits, pros/cons)  *Ability to reason about treatment options (without undue influence of a person or condition, eg. suicidality or acute psychosis)      Current Decision    Clinical issue:   Intrusive thoughts, visual and audio hallucinations    Did the appropriate team address relevant information with the patient:  Yes    Date: 01/26    If \"NO\" is selected for appropriate team, then please discuss with the appropriate team.  The appropriate team should be encouraged to address relevant information with the patient AND reevaluate capacity when appropriate.    Capacity Evaluation    Patient demonstrates ability to consistently communicate choice:  No 01/26    Patient demonstrates ability to understand the relevant information:  No 01/26    Patient demonstrates ability to appreciate the situation and its consequences:  No 01/26    Patient demonstrates ability to reason about treatment " "options:  Yes 01/26    If ANY of the above items are answered \"NO,\" the patient LACKS CAPACITY for that specific decision at hand, at that specific time.  Further capacity evaluations can be done as needed.           Last Recorded Vitals  Blood pressure 151/84, pulse 63, temperature 36.4 °C (97.5 °F), temperature source Temporal, resp. rate 16, height 1.829 m (6'), weight 88.5 kg (195 lb), SpO2 97 %.      Ramsey Anaya, DO      "

## 2024-01-26 NOTE — ED PROVIDER NOTES
EMERGENCY DEPARTMENT ENCOUNTER      Pt Name: Hussain Villafana  MRN: [id number]  Birthdate 1990  Date of evaluation: 1/26/2024  Provider: Ramsey Anaya DO    CHIEF COMPLAINT       Chief Complaint   Patient presents with   • Psychiatric Evaluation     Having irrational thoughts of govt conspiracies         HISTORY OF PRESENT ILLNESS    Is a 33-year-old male who arrives to the emergency department with a chief complaint of wanting to have psychiatric help.  Patient states that he was diagnosed with bipolar disorder 3-1/2 years ago and has had thoughts of paranoia and compartment conspiracies.  He states he has been in therapy and is taking his medications as prescribed which include Zyprexa topiramate, risperidone and trazodone.  He states that for the last few weeks he has been more more paranoid, and last night he had a moment of realization that he needed to seek further help.  He has been attending therapy however he does not think that IP is enough for him anymore.  He denies suicidal or homicidal ideation, however states he has been hearing voices and having visual hallucinations.  He states that he does not feel like he was completely honest about his symptoms with his prior therapist when adjusting the medication.  He states he would like to voluntarily be seen by the psychiatric facility.  He has no physical complaints at this time, no complaints of chest pain shortness of breath, no abdominal pain, he denies nausea vomiting diarrhea constipation or changes in urinary habits.      History provided by:  Patient and medical records      Nursing Notes were reviewed.    PAST MEDICAL HISTORY     Past Medical History:   Diagnosis Date   • Abdominal hernia 07/05/2022   • Chronic prostatitis 07/12/2019   • Continuous LLQ abdominal pain 07/07/2023   • Dyspnea on exertion 07/07/2023   • Epidermoid cyst of neck 07/05/2022   • Generalized anxiety disorder 03/05/2018   • Other chest pain 04/24/2020    Atypical  chest pain   • Personal history of leukemia 04/24/2020    History of lymphoid leukemia         SURGICAL HISTORY       Past Surgical History:   Procedure Laterality Date   • OTHER SURGICAL HISTORY  07/16/2018    Central IV With Subcutaneous [place] Mediport Single Lumen   • OTHER SURGICAL HISTORY  07/19/2021    Cyst excision   • OTHER SURGICAL HISTORY  07/19/2021    Inguinal hernia repair   • OTHER SURGICAL HISTORY  07/21/2021    Mass excision         CURRENT MEDICATIONS       Previous Medications    CHOLECALCIFEROL (VITAMIN D-3) 5,000 UNITS TABLET    Take 1 tablet (5,000 Units) by mouth once daily.    COENZYME Q-10 100 MG CAPSULE    Take 1 capsule (100 mg) by mouth once daily.    FISH OIL CONCENTRATE (OMEGA-3) 120-180 MG CAPSULE    Take 2 capsules (2 g) by mouth once daily.    LACTOBACILLUS ACIDOPHILUS 10 BILLION CELL CAPSULE    Take 1 capsule by mouth once daily.    METOPROLOL SUCCINATE XL (TOPROL-XL) 25 MG 24 HR TABLET    Take 1 tablet (25 mg) by mouth 2 times a day.    OLANZAPINE (ZYPREXA) 15 MG TABLET    Take 1 tablet (15 mg) by mouth once daily at bedtime.    RISPERIDONE (RISPERDAL) 1 MG TABLET    Take 1 tablet (1 mg) by mouth once daily at bedtime.    TOPIRAMATE (TOPAMAX) 200 MG TABLET    Take 1 tablet (200 mg) by mouth once daily.    TRAZODONE (DESYREL) 100 MG TABLET    Take 1 tablet (100 mg) by mouth once daily as needed for sleep.       ALLERGIES     Patient has no known allergies.    FAMILY HISTORY     No family history on file.       SOCIAL HISTORY       Social History     Socioeconomic History   • Marital status: Single     Spouse name: Not on file   • Number of children: Not on file   • Years of education: Not on file   • Highest education level: Not on file   Occupational History   • Not on file   Tobacco Use   • Smoking status: Former     Types: Cigarettes   • Smokeless tobacco: Current   Substance and Sexual Activity   • Alcohol use: Not Currently   • Drug use: Never   • Sexual activity: Not on  file   Other Topics Concern   • Not on file   Social History Narrative   • Not on file     Social Determinants of Health     Financial Resource Strain: Not on file   Food Insecurity: Not on file   Transportation Needs: Not on file   Physical Activity: Not on file   Stress: Not on file   Social Connections: Not on file   Intimate Partner Violence: Not on file   Housing Stability: Not on file       SCREENINGS                        PHYSICAL EXAM    (up to 7 for level 4, 8 or more for level 5)     ED Triage Vitals [01/26/24 1219]   Temperature Heart Rate Respirations BP   36.4 °C (97.5 °F) 63 16 151/84      Pulse Ox Temp Source Heart Rate Source Patient Position   97 % Temporal -- --      BP Location FiO2 (%)     -- --       Physical Exam  Vitals and nursing note reviewed.   Constitutional:       General: He is not in acute distress.     Appearance: Normal appearance. He is normal weight. He is not ill-appearing, toxic-appearing or diaphoretic.   HENT:      Nose: Nose normal.      Mouth/Throat:      Mouth: Mucous membranes are moist.      Pharynx: Oropharynx is clear. No oropharyngeal exudate or posterior oropharyngeal erythema.   Eyes:      General: No scleral icterus.        Right eye: No discharge.         Left eye: No discharge.      Extraocular Movements: Extraocular movements intact.      Conjunctiva/sclera: Conjunctivae normal.      Pupils: Pupils are equal, round, and reactive to light.   Cardiovascular:      Rate and Rhythm: Normal rate. Rhythm irregular.      Pulses: Normal pulses.      Heart sounds: Normal heart sounds. No murmur heard.     No friction rub. No gallop.   Pulmonary:      Effort: Pulmonary effort is normal. No respiratory distress.      Breath sounds: Normal breath sounds. No stridor. No wheezing, rhonchi or rales.   Abdominal:      General: Abdomen is flat.      Palpations: Abdomen is soft.      Tenderness: There is no abdominal tenderness. There is no guarding or rebound.   Musculoskeletal:          General: No swelling, deformity or signs of injury. Normal range of motion.      Cervical back: Normal range of motion.      Right lower leg: No edema.      Left lower leg: No edema.   Skin:     General: Skin is warm and dry.      Capillary Refill: Capillary refill takes less than 2 seconds.      Coloration: Skin is not jaundiced or pale.      Findings: No bruising, erythema, lesion or rash.   Neurological:      General: No focal deficit present.      Mental Status: He is alert and oriented to person, place, and time. Mental status is at baseline.   Psychiatric:         Attention and Perception: Attention normal. He perceives auditory and visual hallucinations.         Mood and Affect: Mood and affect normal.         Speech: Speech normal.         Behavior: Behavior normal. Behavior is not agitated, slowed, aggressive or combative. Behavior is cooperative.         Thought Content: Thought content is paranoid. Thought content does not include homicidal or suicidal ideation. Thought content does not include homicidal or suicidal plan.         Cognition and Memory: Cognition and memory normal.         Judgment: Judgment normal.          DIAGNOSTIC RESULTS     LABS:  Labs Reviewed - No data to display    All other labs were within normal range or not returned as of this dictation.    Imaging  No orders to display        Procedures  Procedures     EMERGENCY DEPARTMENT COURSE/MDM:     ED Course as of 01/26/24 1501   Fri Jan 26, 2024   1459 TSH was within normal limits, patient tested negative for influenza COVID and negative acute toxicology panel, no evidence of acetaminophen salicylate or alcohol poisoning.  Patient's urine drug screen was negative for all drug metabolites tested.  Patient's urinalysis showed no signs of urinary tract infection or blood in urine.  Patient had no electrolyte dyscrasia, normal renal hepatic function, CBC showed no signs of leukocytosis to indicate a systemic infection causing  hallucinations, hemoglobin and hematocrit were within normal limits of 13.9/39.9 no signs of anemia or acute blood loss.  This point the patient is awaiting placement by EP[facility] for voluntary psychiatric admission.  He is continues to be cooperative. [PV]      ED Course User Index  [PV] Ramsey Anaya DO        Medical Decision Making  This is a 33-year-old male with no physical medical complaints he states that he would like to be evaluated for psychiatric admission and treatment due to his bipolar disease, visual audio hallucinations and increased paranoia.  EP[facility] was placed on consult, basic lab work was drawn for medical clearance including CBC, CMP, urinalysis, urine drug screen, toxicology panel, twelve-lead EKG.  EPAT spoke with the patient states that although he does not meet involuntary criteria at this time the patient does voluntarily want to be seen at a psychiatric facility and they will work on placement.        Patient and or family in agreement and understanding of treatment plan.  All questions answered.      I reviewed the case with the attending ED physician. The attending ED physician agrees with the plan. Patient and/or patient´s representative was counseled regarding labs, imaging, likely diagnosis, and plan. All questions were answered.    ED Medications administered this visit:  Medications - No data to display    New Prescriptions from this visit:    New Prescriptions    No medications on file       Follow-up:  No follow-up provider specified.      Final Impression: No diagnosis found.      (Please note that portions of this note were completed with a voice recognition program.  Efforts were made to edit the dictations but occasionally words are mis-transcribed.)     Ramsey Anaya DO  Resident  01/26/24 8835

## 2024-01-26 NOTE — PROGRESS NOTES
EPAT - Social Work Psychiatric Assessment    Arrival Details  Mode of Arrival: Ambulatory  Admission Source: Home  Admission Type: Voluntary  EPAT Assessment Start Date: 01/26/24  EPAT Assessment Start Time: 1330  Name of : Malika Beard. LPC    History of Present Illness  Admission Reason: psychiatric evaluation.  HPI: Pt is 33 years old  male who presented to the ED for a psychiatric evaluation. Pt has a history of bipolar disorder, PTSD, and schizophrenia. Pt has a history of inpatient psychiatric admissions, with the most recent one in October 2022, he doesn’t remember the name of the facility. Pt’s chart, ED provider, and nursing notes were reviewed, which indicates that “for the last few weeks he has been more paranoid, and last night he had a moment of realization that he needed to seek further help. He has been attending IOP, but he does not think that it is enough for him anymore.” Pt is compliant with medications. Pt’s BAL and UDS were negative.    SW Readmission Information   Readmission within 30 Days: No    Psychiatric Symptoms  Anxiety Symptoms: No problems reported or observed.  Depression Symptoms: Change in energy level, Feelings of helplessness, Impaired concentration, Sleep disturbance  Slime Symptoms: No problems reported or observed.    Psychosis Symptoms  Hallucination Type: Auditory, Visual  Delusion Type: Paranoid    Additional Symptoms - Adult  Generalized Anxiety Disorder: No problems reported or observed.  Obsessive Compulsive Disorder: No problems reported or observed.  Panic Attack: No problems reported or observed.  Post Traumatic Stress Disorder: Avoidance of stimuli associated w/ event, Traumatic event  Delirium: No problems reported or observed.    Past Psychiatric History/Meds/Treatments  Past Psychiatric History: bipolar disorder, PTSD, and schizophrenia  Past Psychiatric Meds/Treatments: 10/2022 inpatient, 03/2018 [facility], [place], operated by [facility].    Current Mental Health  Contacts   Name/Phone Number: Karla Guerra APRN-CNP   Last Appointment Date: 1/25/23    Support System: Immediate family, Friends    Living Arrangement: Apartment, Lives alone    Home Safety  Feels Safe Living in Home: Yes    Income Information  Employment Status for: Patient  Employment Status: Unemployed    [place] Service/Education History  Current or Previous  Service: None  Education Level:  (not assessed)         Legal  Legal Considerations: Patient/ Family Ability to Make Healthcare Decisions  Criminal Activity/ Legal Involvement Pertinent to Current Situation/ Hospitalization: none reported  Legal Concerns: none reported    Drug Screening  Have you used any substances (canabis, cocaine, heroin, hallucinogens, inhalants, etc.) in the past 12 months?: No  Have you used any prescription drugs other than prescribed in the past 12 months?: No  Is a toxicology screen needed?: Yes         Psychosocial  Psychosocial (WDL): Within Defined Limits  Behaviors/Mood: Appropriate for age, Appropriate for situation, Calm, Cooperative, Hallucinations  Affect: Appropriate to circumstances    Orientation  Orientation Level: Oriented X4    General Appearance  Motor Activity: Unremarkable  Speech Pattern: Other (Comment) (WDL)  General Attitude: Cooperative  Appearance/Hygiene: Unremarkable    Thought Process  Content: Delusions  Delusions: Paranoid  Perception: Hallucinations  Hallucination: Auditory, Visual  Judgment/Insight: Limited  Confusion: None  Cognition: Appropriate safety awareness, Appropriate attention/concentration, Appropriate for developmental age, Follows commands    Sleep Pattern  Sleep Pattern: Disturbed/interrupted sleep    Risk Factors  Self Harm/Suicidal Ideation Plan: denied  Previous Self Harm/Suicidal Plans: denied  Risk Factors: Age < 19 years old, Major mental illness, Male, Unemployment    Violence Risk Assessment  Assessment of Violence: None noted  Thoughts of  Harm to Others: No    Ability to Assess Risk Screen  Risk Screen - Ability to Assess: Able to be screened  Ask Suicide-Screening Questions  1. In the past few weeks, have you wished you were dead?: No  2. In the past few weeks, have you felt that you or your family would be better off if you were dead?: No  3. In the past week, have you been having thoughts about killing yourself?: No  4. Have you ever tried to kill yourself?: No  5. Are you having thoughts of killing yourself right now?: No  Calculated Risk Score: No intervention is necessary  Bertie Suicide Severity Rating Scale (Screener/Recent Self-Report)  1. Wish to be Dead (Past 1 Month): No  2. Non-Specific Active Suicidal Thoughts (Past 1 Month): No  6. Suicidal Behavior (Lifetime): No  Calculated C-SSRS Risk Score (Lifetime/Recent): No Risk Indicated  Step 1: Risk Factors  Current & Past Psychiatric Dx: Psychotic disorder  Presenting Symptoms: Hopelessness or despair, Insomia  Precipitants/Stressors: Perceived burden on others  Change in Treatment: Hopeless or dissatisfied with provider or treatment  Access to Lethal Methods : No  Step 2: Protective Factors   Protective Factors Internal: Ability to cope with stress, Frustration tolerance, Zoroastrian beliefs, Fear of death or the actual act of killing self, Identifies reasons for living  Protective Factors External: Supportive social network or family or friends, Positive therapeutic relationships  Step 5: Documentation  Risk Level: Low suicide risk    Psychiatric Impression and Plan of Care  Assessment and Plan: Pt is 33 years old  male with a history of bipolar, PTSD, and schizophrenia, was brought to the ED for psychiatric evaluation. Pt has a history of inpatient psychiatric admissions, with the most recent one in October 2022; he doesn’t remember the name of the facility. During the assessment, pt was lying in bed, dressed in hospital attire. Pt was calm and cooperative. Pt stated that “he  had a tough month and especially tough couple days that made him realize that he needs more serious help.” Pt reported that he hasn’t been sleeping in the last couple days. In addition, pt is currently participating in IOP at [facility]. However, he thinks it doesn’t help him. Pt reported being compliant with medications. Pt is low-risk on the [place] SSRS. Pt denying any SI. Pt has no history of SA. However, pt stated that he feels helpless and “that he is a burden to his parents.” Pt was able to identify a support system: parents and friends. Pt was able to identify protective factors: some ability to cope with stress and frustration tolerance, fear of dying, spiritual beliefs against suicide, positive therapeutic relationships, reasons to stay alive (for my future, and I enjoy spending time with family and friends), and a supportive network of friends and family. Pt was not future-oriented: “I have no idea.” Pt is seeing a MH provider and a therapist. Pt denied access to firearms. Pt denied HI. Pt reported VH “ghosts” and AH “voices that are telling me that the government is following me and wants to arrest me.” Pt reported that he has experienced hallucinations for a couple years, but they are becoming more intense and disturbing. The voices do tell him to go places, but he denied that they were commanding him to hurt himself or others. Pt is help-seeking. Pt did not feel safe to be discharged at the moment and wanted to be admitted: “I need more intensive help and maybe some medication adjustment. I cannot go on like that anymore. I need help.”      Pt was assessed and reviewed with Dr. Anaya. Pt is willing to sign the voluntary admission.    Specific Resources Provided to Patient: inpatient admission  CM Notified: no  PHP/IOP Recommended: none    Outcome/Disposition  Patient's Perception of Outcome Achieved: agreeable  Assessment, Recommendations and Risk Level Reviewed with: Dr. Anaya  Contact Name: Shannon  Broderick  Contact Number(s): [phone]  Contact Relationship: parent  EPAT Assessment Completed Date: 01/26/24  EPAT Assessment Completed Time: 1400    Social Work Note

## 2024-01-27 LAB — HOLD SPECIMEN: NORMAL

## 2024-01-27 PROCEDURE — 97165 OT EVAL LOW COMPLEX 30 MIN: CPT | Mod: GO

## 2024-01-27 PROCEDURE — 99222 1ST HOSP IP/OBS MODERATE 55: CPT | Performed by: PSYCHIATRY & NEUROLOGY

## 2024-01-27 PROCEDURE — 2500000001 HC RX 250 WO HCPCS SELF ADMINISTERED DRUGS (ALT 637 FOR MEDICARE OP): Performed by: PSYCHIATRY & NEUROLOGY

## 2024-01-27 PROCEDURE — 1240000001 HC SEMI-PRIVATE BH ROOM DAILY

## 2024-01-27 PROCEDURE — 2500000004 HC RX 250 GENERAL PHARMACY W/ HCPCS (ALT 636 FOR OP/ED): Performed by: PSYCHIATRY & NEUROLOGY

## 2024-01-27 PROCEDURE — 97530 THERAPEUTIC ACTIVITIES: CPT | Mod: GO

## 2024-01-27 PROCEDURE — S4991 NICOTINE PATCH NONLEGEND: HCPCS | Performed by: PSYCHIATRY & NEUROLOGY

## 2024-01-27 PROCEDURE — 2500000002 HC RX 250 W HCPCS SELF ADMINISTERED DRUGS (ALT 637 FOR MEDICARE OP, ALT 636 FOR OP/ED): Performed by: PSYCHIATRY & NEUROLOGY

## 2024-01-27 RX ORDER — RISPERIDONE 1 MG/1
1 TABLET ORAL 2 TIMES DAILY
Status: DISCONTINUED | OUTPATIENT
Start: 2024-01-27 | End: 2024-01-30 | Stop reason: HOSPADM

## 2024-01-27 RX ORDER — TOPIRAMATE 100 MG/1
200 TABLET, FILM COATED ORAL DAILY
Status: DISCONTINUED | OUTPATIENT
Start: 2024-01-27 | End: 2024-01-30 | Stop reason: HOSPADM

## 2024-01-27 RX ORDER — MICONAZOLE NITRATE 2 %
4 CREAM (GRAM) TOPICAL EVERY 2 HOUR PRN
Status: DISCONTINUED | OUTPATIENT
Start: 2024-01-27 | End: 2024-01-30 | Stop reason: HOSPADM

## 2024-01-27 RX ORDER — IBUPROFEN 200 MG
1 TABLET ORAL DAILY
Status: DISCONTINUED | OUTPATIENT
Start: 2024-01-27 | End: 2024-01-29

## 2024-01-27 RX ADMIN — OLANZAPINE 15 MG: 10 TABLET ORAL at 20:48

## 2024-01-27 RX ADMIN — RISPERIDONE 1 MG: 1 TABLET ORAL at 10:13

## 2024-01-27 RX ADMIN — NICOTINE 1 PATCH: 14 PATCH, EXTENDED RELEASE TRANSDERMAL at 08:00

## 2024-01-27 RX ADMIN — TOPIRAMATE 200 MG: 100 TABLET ORAL at 10:13

## 2024-01-27 RX ADMIN — TRAZODONE HYDROCHLORIDE 50 MG: 50 TABLET ORAL at 21:55

## 2024-01-27 RX ADMIN — RISPERIDONE 1 MG: 1 TABLET ORAL at 20:48

## 2024-01-27 RX ADMIN — NICOTINE POLACRILEX 4 MG: 2 GUM, CHEWING BUCCAL at 21:13

## 2024-01-27 ASSESSMENT — LIFESTYLE VARIABLES
ORIENTATION AND CLOUDING OF SENSORIUM: ORIENTED AND CAN DO SERIAL ADDITIONS
ANXIETY: MILDLY ANXIOUS
CIWA TOTAL SCORE: 2
TREMOR: NO TREMOR
HEADACHE, FULLNESS IN HEAD: NOT PRESENT
PAROXYSMAL SWEATS: NO SWEAT VISIBLE
AGITATION: NORMAL ACTIVITY
NAUSEA AND VOMITING: NO NAUSEA AND NO VOMITING
TOTAL_SCORE: 0
AUDITORY DISTURBANCES: VERY MILD HARSHNESS OR ABILITY TO FRIGHTEN
VISUAL DISTURBANCES: NOT PRESENT

## 2024-01-27 ASSESSMENT — PAIN SCALES - GENERAL
PAINLEVEL_OUTOF10: 0 - NO PAIN
PAINLEVEL_OUTOF10: 0 - NO PAIN

## 2024-01-27 ASSESSMENT — PAIN - FUNCTIONAL ASSESSMENT
PAIN_FUNCTIONAL_ASSESSMENT: 0-10
PAIN_FUNCTIONAL_ASSESSMENT: 0-10

## 2024-01-27 NOTE — H&P
Reason for admission:  Psychosis    History Of Present Illness  Hussain Villafana is a 33 y.o. male single live alone presenting with increase psychosis.       EPAT report:  Pt is 33 years old  male with a history of bipolar, PTSD, and schizophrenia, was brought to the ED for psychiatric evaluation. Pt has a history of inpatient psychiatric admissions, with the most recent one in October 2022; he doesn’t remember the name of the facility. During the assessment, pt was lying in bed, dressed in hospital attire. Pt was calm and cooperative. Pt stated that “he had a tough month and especially tough couple days that made him realize that he needs more serious help.” Pt reported that he hasn’t been sleeping in the last couple days. In addition, pt is currently participating in IOP at [facility]. However, he thinks it doesn’t help him. Pt reported being compliant with medications. Pt is low-risk on the [place] SSRS. Pt denying any SI. Pt has no history of SA. However, pt stated that he feels helpless and “that he is a burden to his parents.” Pt was able to identify a support system: parents and friends. Pt was able to identify protective factors: some ability to cope with stress and frustration tolerance, fear of dying, spiritual beliefs against suicide, positive therapeutic relationships, reasons to stay alive (for my future, and I enjoy spending time with family and friends), and a supportive network of friends and family. Pt was not future-oriented: “I have no idea.” Pt is seeing a MH provider and a therapist. Pt denied access to firearms. Pt denied HI. Pt reported VH “ghosts” and AH “voices that are telling me that the government is following me and wants to arrest me.” Pt reported that he has experienced hallucinations for a couple years, but they are becoming more intense and disturbing. The voices do tell him to go places, but he denied that they were commanding him to hurt himself or others. Pt is  help-seeking. Pt did not feel safe to be discharged at the moment and wanted to be admitted: “I need more intensive help and maybe some medication adjustment. I cannot go on like that anymore. I need help.”       During interview:    Pt was diagnosed with bipolar disorder, disagreed with Schizophrenia diagnosis  Was taking Topamax, zyprexa and risperdal for 3 years  Was seeing Psychiatry NP at [facility]    Pt believe that the government has been conspiring against him, that he is a subject of [employer] story, that they have been tracing his movements, tapping his phone calls  Pt found about congressman involved in money laundering and in order to hide this they are targeting him  Has been going for past 3.5 years, getting worse  I don't want to have these thoughts any more, want to move on with life  I want this story to come to end, either by punishing me or relieving me  Pt has been compliant with medication  AH- yes I hear voices. Would not open up about the content  Has been feeling down than usual but not depressed  Not suicidal  No access to gun or weapons    PPH: Has been hospitalized 7 times before under similar cinrcumstances  Last admit was 10/2020  Never attempted suicide    CD: No drugs, occasional alcohol use      Past Medical History  He has a past medical history of Abdominal hernia (07/05/2022), Chronic prostatitis (07/12/2019), Continuous LLQ abdominal pain (07/07/2023), Dyspnea on exertion (07/07/2023), Epidermoid cyst of neck (07/05/2022), Generalized anxiety disorder (03/05/2018), Other chest pain (04/24/2020), and Personal history of leukemia (04/24/2020).    Surgical History  He has a past surgical history that includes Other surgical history (07/16/2018); Other surgical history (07/19/2021); Other surgical history (07/19/2021); and Other surgical history (07/21/2021).     Social History  He reports that he has quit smoking. His smoking use included cigarettes. He uses smokeless tobacco. He  "reports that he does not currently use alcohol. He reports that he does not use drugs.     Allergies  Patient has no known allergies.        Mental Status Exam  General: Alert and oriented x 3  Appearance: stated age  Attitude: cooperative  Behavior: normal  Motor Activity: decreased  Speech: normal rate  Mood: distressed  Affect: flat  Thought Process: normal  Thought Content: persecutory delusion  Thought Perception: AH ++  Cognition: intact  Insight: poor  Judgement: poor    Psychiatric Risk Assessment  High risk of acting out on his delusions    Last Recorded Vitals  Blood pressure 118/59, pulse 76, temperature 36.2 °C (97.2 °F), temperature source Temporal, resp. rate 18, height 1.829 m (6' 0.01\"), weight 88.5 kg (195 lb), SpO2 99 %.    Relevant Results  Results for orders placed or performed during the hospital encounter of 01/26/24 (from the past 96 hour(s))   Urinalysis with Reflex Culture and Microscopic   Result Value Ref Range    Color, Urine Straw Straw, Yellow    Appearance, Urine Clear Clear    Specific Gravity, Urine 1.003 (N) 1.005 - 1.035    pH, Urine 7.0 5.0, 5.5, 6.0, 6.5, 7.0, 7.5, 8.0    Protein, Urine NEGATIVE NEGATIVE mg/dL    Glucose, Urine NEGATIVE NEGATIVE mg/dL    Blood, Urine NEGATIVE NEGATIVE    Ketones, Urine NEGATIVE NEGATIVE mg/dL    Bilirubin, Urine NEGATIVE NEGATIVE    Urobilinogen, Urine <2.0 <2.0 mg/dL    Nitrite, Urine NEGATIVE NEGATIVE    Leukocyte Esterase, Urine NEGATIVE NEGATIVE   Extra Urine Gray Tube   Result Value Ref Range    Extra Tube Hold for add-ons.    Drug Screen, Urine   Result Value Ref Range    Amphetamine Screen, Urine Presumptive Negative Presumptive Negative    Barbiturate Screen, Urine Presumptive Negative Presumptive Negative    Benzodiazepines Screen, Urine Presumptive Negative Presumptive Negative    Cannabinoid Screen, Urine Presumptive Negative Presumptive Negative    Cocaine Metabolite Screen, Urine Presumptive Negative Presumptive Negative    Fentanyl " Screen, Urine Presumptive Negative Presumptive Negative    Opiate Screen, Urine Presumptive Negative Presumptive Negative    Oxycodone Screen, Urine Presumptive Negative Presumptive Negative    PCP Screen, Urine Presumptive Negative Presumptive Negative   Sars-CoV-2 and Influenza A/B PCR   Result Value Ref Range    Flu A Result Not Detected Not Detected    Flu B Result Not Detected Not Detected    Coronavirus 2019, PCR Not Detected Not Detected   CBC and Auto Differential   Result Value Ref Range    WBC 8.1 4.4 - 11.3 x10*3/uL    nRBC 0.0 0.0 - 0.0 /100 WBCs    RBC 4.49 (L) 4.50 - 5.90 x10*6/uL    Hemoglobin 13.9 13.5 - 17.5 g/dL    Hematocrit 39.9 (L) 41.0 - 52.0 %    MCV 89 80 - 100 fL    MCH 31.0 26.0 - 34.0 pg    MCHC 34.8 32.0 - 36.0 g/dL    RDW 12.4 11.5 - 14.5 %    Platelets 181 150 - 450 x10*3/uL    Neutrophils % 79.9 40.0 - 80.0 %    Immature Granulocytes %, Automated 0.2 0.0 - 0.9 %    Lymphocytes % 12.3 13.0 - 44.0 %    Monocytes % 7.1 2.0 - 10.0 %    Eosinophils % 0.4 0.0 - 6.0 %    Basophils % 0.1 0.0 - 2.0 %    Neutrophils Absolute 6.43 1.20 - 7.70 x10*3/uL    Immature Granulocytes Absolute, Automated 0.02 0.00 - 0.70 x10*3/uL    Lymphocytes Absolute 0.99 (L) 1.20 - 4.80 x10*3/uL    Monocytes Absolute 0.57 0.10 - 1.00 x10*3/uL    Eosinophils Absolute 0.03 0.00 - 0.70 x10*3/uL    Basophils Absolute 0.01 0.00 - 0.10 x10*3/uL   Comprehensive Metabolic Panel   Result Value Ref Range    Glucose 102 (H) 74 - 99 mg/dL    Sodium 134 (L) 136 - 145 mmol/L    Potassium 4.0 3.5 - 5.3 mmol/L    Chloride 103 98 - 107 mmol/L    Bicarbonate 24 21 - 32 mmol/L    Anion Gap 11 10 - 20 mmol/L    Urea Nitrogen 10 6 - 23 mg/dL    Creatinine 0.98 0.50 - 1.30 mg/dL    eGFR >90 >60 mL/min/1.73m*2    Calcium 9.6 8.6 - 10.3 mg/dL    Albumin 4.6 3.4 - 5.0 g/dL    Alkaline Phosphatase 94 33 - 120 U/L    Total Protein 6.7 6.4 - 8.2 g/dL    AST 49 (H) 9 - 39 U/L    Bilirubin, Total 0.6 0.0 - 1.2 mg/dL    ALT 37 10 - 52 U/L   Acute  Toxicology Panel, Blood   Result Value Ref Range    Acetaminophen <10.0 10.0 - 30.0 ug/mL    Salicylate  <3 4 - 20 mg/dL    Alcohol <10 <=10 mg/dL   TSH   Result Value Ref Range    Thyroid Stimulating Hormone 0.68 0.44 - 3.98 mIU/L   Lavender Top   Result Value Ref Range    Extra Tube Hold for add-ons.    PST Top   Result Value Ref Range    Extra Tube Hold for add-ons.    Electrocardiogram, 12-lead   Result Value Ref Range    Ventricular Rate 59 BPM    Atrial Rate 59 BPM    MS Interval 174 ms    QRS Duration 108 ms    QT Interval 410 ms    QTC Calculation(Bazett) 405 ms    P Axis 54 degrees    R Axis 77 degrees    T Axis 36 degrees    QRS Count 10 beats    Q Onset 214 ms    P Onset 127 ms    P Offset 182 ms    T Offset 419 ms    QTC Fredericia 407 ms     XR abdomen 1 view    Result Date: 1/26/2024  Interpreted By:  Jordi Arce, STUDY: XR ABDOMEN 1 VIEW;  1/25/2024 12:25 pm   INDICATION: Signs/Symptoms:Follow up clean-out.   COMPARISON: None.   ACCESSION NUMBER(S): [id number]   ORDERING CLINICIAN: MIRNA MYLES   FINDINGS: The bowel gas pattern appears nonspecific and nonobstructive. Moderate degree of stool noted within the ascending colon. Remaining portions of the colon are clear. Psoas shadows are maintained. No pathologic calcifications are noted. Osseous structures grossly intact. Suspected bone island in the right iliac wing.       1.  Nonspecific and nonobstructive bowel gas pattern.   MACRO: None   Signed by: Jordi Arce 1/26/2024 5:49 PM Dictation workstation:   [id number]    Electrocardiogram, 12-lead    Result Date: 1/26/2024  Sinus bradycardia Incomplete right bundle branch block Borderline ECG When compared with ECG of 09-OCT-2023 20:24, Previous ECG has undetermined rhythm, needs review    Urgent Care Xray    Result Date: 1/15/2024  Interpreted By:  Shannan Rosas, STUDY: XR URGENT CARE XRAY;  1/15/2024 1:19 pm   INDICATION: Signs/Symptoms:abd series.   COMPARISON: None.   ACCESSION NUMBER(S):  [id number]   ORDERING CLINICIAN: HILARY BENTLEY   TECHNIQUE: Body Part:  Abdomen and chest Side:  Bilateral Number of Views:  4   FINDINGS: Bowel gas pattern is nonobstructing with stool throughout the colon. No gross pathologic calcifications are noted.   The heart is not enlarged. No pleural effusion or pneumothorax is noted. A sclerotic lesion is seen in the right iliac bone, which may be related to a bone island.       Nonobstructing bowel gas pattern.   No active cardiopulmonary disease.     MACRO: None   Signed by: Shannan Rosas 1/15/2024 1:32 PM Dictation workstation:   [id number]           Assessment/Plan   Principal Problem:    Schizo affective schizophrenia (CMS/HCC)      Impression:    Medication : restarted Zyprexa and topamax, increased risperdal  Blood test: reviewed  Encouraged patient to attend group and other mileu activity  Collateral from family - pending  Discharge planing           Medication Consent  Medication Consent: risks, benefits, side effects reviewed for all ordered meds    Charles Ann MD

## 2024-01-27 NOTE — CONSULTS
Reason For Consult  Medical optimization for inpatient behavioral health evaluation       History Of Present Illness  Hussain Villafana is a 33 y.o. male presenting with worsening psychosis. Patient examined and seen. Alert and oriented x3, explained to patient assessment, right to , and the right to decline assessment. Patient verbalized understanding and agreed to assessment with RN as . Patient denies chest pain, shortness of breath, palpitations, abdominal pain, fever or chills.Hospitalist to evaluate medical optimization for psychiatric treatment and evaluation.  Neurological evaluation completed.  No acute or chronic medical issues identified at this time that could be contributing to underlying psychiatric symptoms. Pt is medically optimized for inpatient behavioral health evaluation and treatment.    Review of systems: 10 system were reviewed and were negative except what was mentioned in history of present illness     Past Medical History  He has a past medical history of Abdominal hernia (07/05/2022), Chronic prostatitis (07/12/2019), Continuous LLQ abdominal pain (07/07/2023), Dyspnea on exertion (07/07/2023), Epidermoid cyst of neck (07/05/2022), Generalized anxiety disorder (03/05/2018), Other chest pain (04/24/2020), and Personal history of leukemia (04/24/2020).    Surgical History  He has a past surgical history that includes Other surgical history (07/16/2018); Other surgical history (07/19/2021); Other surgical history (07/19/2021); and Other surgical history (07/21/2021).     Social History  He reports that he has quit smoking. His smoking use included cigarettes. He uses smokeless tobacco. He reports that he does not currently use alcohol. He reports that he does not use drugs.    Family History  No family history on file.     Allergies  Patient has no known allergies.       Physical Exam    Constitutional: Well developed, awake/alert/oriented x3, no distress,  cooperative  Eyes: PERRL, EOMI, clear sclera  ENMT: mucous membranes moist, no apparent injury, no lesions seen  Head/Neck: Neck supple, no apparent injury, thyroid without mass or tenderness  Respiratory/Thorax: Patent airways,  normal breath sounds   Cardiovascular: Regular, rate and rhythm, no murmurs,  normal S 1and S 2  Gastrointestinal: Nondistended, soft, non-tender,    Genitourinary: denies CVA tenderness  or suprapubic tenderness,  voiding freely   Musculoskeletal: ROM intact, no joint swelling,   Extremities: normal extremities,  no contusions or wounds seen   Skin: warm, dry, intact  Neurological: alert/oriented x 3, speech clear, cranial nerves II through XII  grossly intact  Psychiatric: appropriate mood and behavior  Cranial Nerve Exam: II, III, IV, VI: Visual acuity within normal limits bilaterally, visual fields normal in all quadrants, QUIANA, EOMI  Cranial Nerve exam: V: Facial sensations intact bilaterally to dull, sharp, and light touch stimuli  Cranial Nerve exam VII: Facial muscle strength normal/equal bilaterally  Cranial Nerve Exam VIII: Hearing is normal bilaterally  Cranial Nerve IX,X: Palate and Uvula symmetrical, voice is normal  Cranial Nerve XI: Shoulder shrug strong, equal bilaterally  Cranial Nerve XII: Tongue moves symmetrically  Motor : Good muscle tone, Strength equal upper and lower extremities unless otherwise stated above  Cerebellar: normal gait unless otherwise stated above    Last Recorded Vitals  /59   Pulse 76   Temp 36.2 °C (97.2 °F) (Temporal)   Resp 18   Wt 88.5 kg (195 lb)   SpO2 99%     Assessment/Plan     Schizoaffective disorder  Bipolar  PTSD  Nicotine abuse by means of chewing tobacco    -Admit to Behavioral Health Unit  -Contract for Safety   -Safety Protocols  -Medications to be determined by psychiatry   -Vital Signs BID  -Group Therapy as appropriate  -Social Work for outpatient therapy   -Encourage Healthy Lifestyle and Exercise as  appropriate  -Nicotine patch increased to 21 mg daily    Medicine to Sign off  Hemodynamically stable at this time  Reviewed labs  Please call if acute needs or requested assessment is needed    Shalini Garcia, APRN-CNP    Plan of care was discussed extensively with patient.  Patient verbalized understanding through teach back method.  All question and concerns addressed upon examination.    Of note, this documentation is completed using the Dragon Dictation system (voice recognition software). There may be spelling and/or grammatical errors that were not corrected prior to final submission.

## 2024-01-27 NOTE — ED NOTES
Report called to [facility] at [facility]     Angie Elias RN  01/26/24 1930

## 2024-01-27 NOTE — PROGRESS NOTES
Occupational Therapy     REHAB Therapy Assessment & Treatment    Patient Name: Hussain Villafana  MRN: [id number]  Today's Date: 1/27/2024        Attendance:  Attendance  Activity: Discussion/reminisce (Managing Mental Illness)  Participation: Active participation    Therapeutic Recreation:  Treatment Approach  Approach :  (60 minutes)  Cognition: Attention, Directions, Orientation (Pt attentive to information discussed.  Pt took time before answering questions asked, statements and contributions were linear and organized.)  Social Skills: Demonstrates ability to listen to others, Does not initiate conversation or social interaction (No spontaneous socialization with peers, participated when prompted by therapist when it was his turn to choose and answer a question.)  Community Reintegration:  (Pt discussed mental health resources in the community and having a good support network.)  Emotional:  (Depressed, guarded, poor eye contact)  Stress Management/Relaxation Training:  (Pt able to identify healthy coping tools that he uses to for stress management including exercise, journaling and meditation.)  Treatment Approach Comments: Patient attended OT session with good participation.  Quiet and soft spoken, decreased eye contact.  Pt displayed insight when discussing management of his mental illness stating that he has been in treatment and on medication for 14 years and has a good support network.  Pt receptive to support given by therapist and peers.      Encounter Problems       Encounter Problems (Active)       OT Goals       Communication/Interaction Skills (Self-expression/social Behavior/assertive) (Progressing)       Start:  01/27/24    Expected End:  02/24/24       Communication/Interaction Skills (Self-expression/social Behavior/assertive)    Explore/demonstrate 1-2 effective methods of expressing feelings/wants/needs (can include assertion/engaging/sharing/asking) prior to discharge          Coping  Skills (Progressing)       Start:  01/27/24    Expected End:  02/24/24       Coping Skills      Explore/identify 1-2 effective strategies of expressing feelings, wants, needs(can include assertion, engaging, sharing, asking) prior to discharge          OT Goal 3 (Progressing)       Start:  01/27/24    Expected End:  02/24/24        Emotion Regulation/self control      Pt will exhibit appropriate range, regulation of emotions, impulse control, frustration tolerance in OT groups prior to discharge.                    Education Documentation  No documentation found.  Education Comments  No comments found.          Additional Comments:

## 2024-01-27 NOTE — PROGRESS NOTES
"Occupational Therapy    Evaluation    Patient Name: Hussain Villaafna  MRN: [id number]  Today's Date: 1/27/2024  Time Calculation  Start Time: 1225  Stop Time: 1250  Time Calculation (min): 25 min    Assessment  IP OT Assessment  OT Assessment: Emotional Regulation (Pt presented with poor eye contact, flat affect and depressed mood.  Guarded at times, particularly when sharing about new symptoms of paranoia and AH/VH.  Pt reports that he feels like a burden to his family and that he has had a \"tough month.\")  Plan:  Occupational Therapy Intervention Plan:    OT Interventions: Therapeutic use of self/activities, OT Task Skills Group/1:1, OT Life Management Skills Group/1:1, Grief/Anger Management Group/1:1, ADL/IADL Group/1:1      Subjective   Current Problem:  No diagnosis found.  General:  General  Reason for Referral: Impaired IADLs/Safety (32 yo male who arrives to the emergency department with a chief complaint of wanting to have psychiatric help.  Patient states that he was diagnosed with bipolar disorder 3-1/2 years ago and has had thoughts of paranoia and compartment conspiracies.)  Past Medical History Relevant to Rehab: Pt reports past medical history of bipolar disorder with 8 hospitalizations since he was diagnosed at age 20.  Pt currently attending IOP program at [facility] where he receives psychiatric follow up.  General Comment: Stressors (Exacerbation of mental health symptoms, increasing paranoia with AH/VH.  Pt reports stressors of feeling that he is a burden on his parents emotionally and financially as he is currently not working.)       Objective   Cognition:  Overall Cognitive Status: Within Functional Limits  Orientation Level: Oriented X4  Attention: Within Functional Limits           Home Living:  Type of Home: Apartment  Lives With: Alone  Home Living Comments: Pt currently resides alone, however states that since his first diagnosis at the age of 20 he has been living at times with " "his parents.  Pt reports that he feels safe living alone at this time.   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  Prior Function Comments: Pt recently completed [facility] IOP program, currently seeing a counselor weekly.  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  Pt has a history of being active with AA.  Reports medication compliance.  Pt denies difficulties with home care stating \"I'm at peace when doing daily tasks.\"  Limited socialization/support other than parents.  Pt has two siblings that live out of state that he talks with on occasion.  ADL:  ADL Comments: Pt denies difficulties with ADLs at this time.  States he has been sleeping well (approx 9 hours/night), and that he has a healthy diet.  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  Pt has a history of being active with AA.  Reports medication compliance.  Pt denies difficulties with home care stating \"I'm at peace when doing daily tasks.\"  Limited socialization/support other than parents.  Pt has two siblings that live out of state that he talks with on occasion.    Education Documentation  No documentation found.  Education Comments  No comments found.      Goals:   Encounter Problems       Encounter Problems (Active)       OT Goals       Communication/Interaction Skills (Self-expression/social Behavior/assertive)       Start:  01/27/24    Expected End:  02/24/24       Communication/Interaction Skills (Self-expression/social Behavior/assertive)    Explore/demonstrate 1-2 effective methods of expressing feelings/wants/needs (can include assertion/engaging/sharing/asking) prior to discharge          Coping Skills       Start:  01/27/24    Expected End:  02/24/24       Coping Skills      Explore/identify 1-2 effective strategies of expressing feelings, wants, needs(can include assertion, engaging, sharing, asking) prior to discharge          OT Goal 3       Start:  01/27/24    Expected End:  02/24/24        Emotion Regulation/self control      Pt will exhibit appropriate range, regulation of emotions, impulse control, frustration tolerance in OT groups prior to discharge.                   "

## 2024-01-28 LAB
CHOLEST SERPL-MCNC: 176 MG/DL (ref 0–199)
CHOLESTEROL/HDL RATIO: 2.3
HDLC SERPL-MCNC: 76.9 MG/DL
HOLD SPECIMEN: NORMAL
LDLC SERPL CALC-MCNC: 82 MG/DL
NON HDL CHOLESTEROL: 99 MG/DL (ref 0–149)
TRIGL SERPL-MCNC: 86 MG/DL (ref 0–149)
TSH SERPL-ACNC: 0.64 MIU/L (ref 0.44–3.98)
VLDL: 17 MG/DL (ref 0–40)

## 2024-01-28 PROCEDURE — 80061 LIPID PANEL: CPT | Performed by: PSYCHIATRY & NEUROLOGY

## 2024-01-28 PROCEDURE — 97535 SELF CARE MNGMENT TRAINING: CPT | Mod: GO

## 2024-01-28 PROCEDURE — 2500000004 HC RX 250 GENERAL PHARMACY W/ HCPCS (ALT 636 FOR OP/ED): Performed by: PSYCHIATRY & NEUROLOGY

## 2024-01-28 PROCEDURE — 2500000002 HC RX 250 W HCPCS SELF ADMINISTERED DRUGS (ALT 637 FOR MEDICARE OP, ALT 636 FOR OP/ED)

## 2024-01-28 PROCEDURE — 1240000001 HC SEMI-PRIVATE BH ROOM DAILY

## 2024-01-28 PROCEDURE — 83036 HEMOGLOBIN GLYCOSYLATED A1C: CPT | Mod: ELYLAB | Performed by: PSYCHIATRY & NEUROLOGY

## 2024-01-28 PROCEDURE — S4991 NICOTINE PATCH NONLEGEND: HCPCS

## 2024-01-28 PROCEDURE — 97150 GROUP THERAPEUTIC PROCEDURES: CPT | Mod: GO

## 2024-01-28 PROCEDURE — 36415 COLL VENOUS BLD VENIPUNCTURE: CPT | Performed by: PSYCHIATRY & NEUROLOGY

## 2024-01-28 PROCEDURE — 99232 SBSQ HOSP IP/OBS MODERATE 35: CPT | Performed by: PSYCHIATRY & NEUROLOGY

## 2024-01-28 PROCEDURE — 2500000001 HC RX 250 WO HCPCS SELF ADMINISTERED DRUGS (ALT 637 FOR MEDICARE OP): Performed by: PSYCHIATRY & NEUROLOGY

## 2024-01-28 PROCEDURE — 84443 ASSAY THYROID STIM HORMONE: CPT | Performed by: PSYCHIATRY & NEUROLOGY

## 2024-01-28 RX ADMIN — RISPERIDONE 1 MG: 1 TABLET ORAL at 08:28

## 2024-01-28 RX ADMIN — NICOTINE POLACRILEX 4 MG: 2 GUM, CHEWING BUCCAL at 20:36

## 2024-01-28 RX ADMIN — RISPERIDONE 1 MG: 1 TABLET ORAL at 20:36

## 2024-01-28 RX ADMIN — TOPIRAMATE 200 MG: 100 TABLET ORAL at 08:28

## 2024-01-28 RX ADMIN — NICOTINE POLACRILEX 4 MG: 2 GUM, CHEWING BUCCAL at 18:33

## 2024-01-28 RX ADMIN — NICOTINE 1 PATCH: 21 PATCH, EXTENDED RELEASE TRANSDERMAL at 09:11

## 2024-01-28 RX ADMIN — NICOTINE 1 PATCH: 21 PATCH, EXTENDED RELEASE TRANSDERMAL at 08:51

## 2024-01-28 RX ADMIN — OLANZAPINE 15 MG: 10 TABLET ORAL at 20:36

## 2024-01-28 ASSESSMENT — PAIN SCALES - GENERAL
PAINLEVEL_OUTOF10: 0 - NO PAIN
PAINLEVEL_OUTOF10: 0 - NO PAIN

## 2024-01-28 ASSESSMENT — PAIN - FUNCTIONAL ASSESSMENT
PAIN_FUNCTIONAL_ASSESSMENT: 0-10
PAIN_FUNCTIONAL_ASSESSMENT: 0-10

## 2024-01-28 ASSESSMENT — ACTIVITIES OF DAILY LIVING (ADL): HOME_MANAGEMENT_TIME_ENTRY: 30

## 2024-01-28 NOTE — PROGRESS NOTES
Occupational Therapy     REHAB Occupational Therapy Assessment & Treatment    Patient Name: Hussain Villafana  MRN: [id number]  Today's Date: 1/28/2024      Activity:  Stress Management Group - Cognitive Restructuring     Attendance:  Attendance  Activity: Discussion/reminisce  Participation: Active participation    Treatment Approach  Approach : Group therapy sessions, 30 to 45 minutes  Patient Stated Goals: none stated  Cognition: Attention (Pt alert, oriented, & appeared attentive for duration of group. Demonstrates fair understanding.)  Social Skills: Interacts independently in social activity (only x1 but increased from previous group)  Emotional: Mood (Pt mood & affect constricted)  Stress Management/Relaxation Training: Identifies benefits of stress management/relaxation techniques  Treatment Approach Comments: Pt attended & participated in therapy group focused on stress management through cognitive restructuring or “Short-Circuiting Stress”. Pt given educational handouts defining & describing cognitive distortions, and outlining tips for cognitive restructuring to manage stress. Reviewed & discussed. Next, pts were given example scenarios where cognitive distortions are used, & asked to determine what cognitive distortions are demonstrated as well as alternate ways to view the scenario. Pt contributed to this discussion independently & w/ accuracy. Pt then viewed video, “Short-Circuiting Stress” which included examples of ways to use cognitive restructuring during everyday occupations such as ADLs, IADLs, and work. Pt receptive to learning & demonstrates fair understanding of education.      Encounter Problems       Encounter Problems (Active)       OT Goals       Communication/Interaction Skills (Self-expression/social Behavior/assertive) (Progressing)       Start:  01/27/24    Expected End:  02/24/24       Communication/Interaction Skills (Self-expression/social  Behavior/assertive)    Explore/demonstrate 1-2 effective methods of expressing feelings/wants/needs (can include assertion/engaging/sharing/asking) prior to discharge          Coping Skills (Progressing)       Start:  01/27/24    Expected End:  02/24/24       Coping Skills      Explore/identify 1-2 effective strategies of expressing feelings, wants, needs(can include assertion, engaging, sharing, asking) prior to discharge          OT Goal 3 (Progressing)       Start:  01/27/24    Expected End:  02/24/24        Emotion Regulation/self control      Pt will exhibit appropriate range, regulation of emotions, impulse control, frustration tolerance in OT groups prior to discharge.                Education:  Pt provided educational handouts on stress management through cognitive restructuring. Reviewed & discussed. Pt demonstrates fair understanding.

## 2024-01-28 NOTE — PROGRESS NOTES
Occupational Therapy    [facility] Occupational Therapy Assessment & Treatment    Patient Name: Hussain Villafana  MRN: [id number]  Today's Date: 1/28/2024      Activity:  Stress Management & Occupational Balance Group    Attendance:  Attendance  Activity: Discussion/reminisce  Participation: Passive participation    Treatment Approach  Approach : Group therapy sessions, 30 to 45 minutes  Patient Stated Goals: none stated  Cognition: Attention (Pt alert, oriented, & appeared attentive for duration of group.)  Social Skills: Isolative - does not initiate involvement in social activity  Emotional: Mood (Pt mood & affect constricted)  Stress Management/Relaxation Training: Utilizes stress management/relaxation techniques  Treatment Approach Comments: Pt attended & participated in therapy group focused on stress management through occupational balance. Pt given educational handouts on stress management tips & occupational balance. Reviewed & discussed. Also educated on & discussed the use of stress management & occupational balance strategies to improve role performances & engagement in multiple areas of occupation. Pt did not spontaneously participate in group, so difficult to determine his understanding. However, pt remained in room for duration of group & appeared to be actively listening.      Encounter Problems       Encounter Problems (Active)       OT Goals       Communication/Interaction Skills (Self-expression/social Behavior/assertive) (Progressing)       Start:  01/27/24    Expected End:  02/24/24       Communication/Interaction Skills (Self-expression/social Behavior/assertive)    Explore/demonstrate 1-2 effective methods of expressing feelings/wants/needs (can include assertion/engaging/sharing/asking) prior to discharge          Coping Skills (Progressing)       Start:  01/27/24    Expected End:  02/24/24       Coping Skills      Explore/identify 1-2 effective strategies of expressing feelings, wants,  needs(can include assertion, engaging, sharing, asking) prior to discharge          OT Goal 3 (Progressing)       Start:  01/27/24    Expected End:  02/24/24        Emotion Regulation/self control      Pt will exhibit appropriate range, regulation of emotions, impulse control, frustration tolerance in OT groups prior to discharge.                  Education:  Pt provided educational handouts on stress management tips & occupational balance. Reviewed & discussed. Difficult to determine pt's understanding due to no spontaneous participation, will continue to reinforce & assess.

## 2024-01-28 NOTE — PROGRESS NOTES
"Hussain Villafana is a 33 y.o. male on day 2 of admission presenting with Schizo affective schizophrenia (CMS/HCC).    SUBJECTIVE:  Pt report that med adjustment is working for him  AH- less intense and not bothering him which to him is a big change  Still thinking about various conspiracy theory and paranoid, but less focused   Starting to doubt about his paranoid thoughts now  I don't see these were real  Slept better last night      Exam:  Vital Signs: /73   Pulse 59   Temp 36.8 °C (98.2 °F)   Resp 18   Ht 1.829 m (6' 0.01\")   Wt 88.5 kg (195 lb)   SpO2 100%   BMI 26.44 kg/m²   Musculoskeletal: Muscle tone: WNL  Gait/Station: normal      Mental Status Exam:  General Appearance: casual dressed  Speech: soft  Mood: less anxious  Affect: inappropriate  Thought Process: Linear, goal directed  Thought Associations: No loosening of associations  Thought Content: paranoid ideation  Perception: Auditory hallucinations noted, no visual hallucinations noted  Level of Consciousness: Alert  Orientation: Alert and oriented to person, place, time and situation  Attention and Concentration: Intact  Cognition:fair  Insight: fair  Judgment:fair     Results for orders placed or performed during the hospital encounter of 01/26/24 (from the past 96 hour(s))   Lipid Panel   Result Value Ref Range    Cholesterol 176 0 - 199 mg/dL    HDL-Cholesterol 76.9 mg/dL    Cholesterol/HDL Ratio 2.3     LDL Calculated 82 <=99 mg/dL    VLDL 17 0 - 40 mg/dL    Triglycerides 86 0 - 149 mg/dL    Non HDL Cholesterol 99 0 - 149 mg/dL   Thyroid Stimulating Hormone   Result Value Ref Range    Thyroid Stimulating Hormone 0.64 0.44 - 3.98 mIU/L         Impression:   Schizoaffective disorder    Recommendations:    Impression:    Medication reviewed. To continue as ordered  Blood test: reviewed  Encouraged patient to attend group and other mileu activity  Collateral from family - pending  Discharge planing      Thank you for allowing us to " participate in the care of this patient.       Charles Ann MD  1/28/2024  9:32 AM

## 2024-01-29 LAB
ATRIAL RATE: 59 BPM
EST. AVERAGE GLUCOSE BLD GHB EST-MCNC: 94 MG/DL
HBA1C MFR BLD: 4.9 %
P AXIS: 54 DEGREES
P OFFSET: 182 MS
P ONSET: 127 MS
PR INTERVAL: 174 MS
Q ONSET: 214 MS
QRS COUNT: 10 BEATS
QRS DURATION: 108 MS
QT INTERVAL: 410 MS
QTC CALCULATION(BAZETT): 405 MS
QTC FREDERICIA: 407 MS
R AXIS: 77 DEGREES
T AXIS: 36 DEGREES
T OFFSET: 419 MS
VENTRICULAR RATE: 59 BPM

## 2024-01-29 PROCEDURE — 2500000001 HC RX 250 WO HCPCS SELF ADMINISTERED DRUGS (ALT 637 FOR MEDICARE OP): Performed by: PSYCHIATRY & NEUROLOGY

## 2024-01-29 PROCEDURE — 2500000004 HC RX 250 GENERAL PHARMACY W/ HCPCS (ALT 636 FOR OP/ED): Performed by: PSYCHIATRY & NEUROLOGY

## 2024-01-29 PROCEDURE — 1240000001 HC SEMI-PRIVATE BH ROOM DAILY

## 2024-01-29 PROCEDURE — 99232 SBSQ HOSP IP/OBS MODERATE 35: CPT | Performed by: PSYCHIATRY & NEUROLOGY

## 2024-01-29 PROCEDURE — 97150 GROUP THERAPEUTIC PROCEDURES: CPT | Mod: GO

## 2024-01-29 PROCEDURE — 97530 THERAPEUTIC ACTIVITIES: CPT | Mod: GO

## 2024-01-29 RX ADMIN — TRAZODONE HYDROCHLORIDE 50 MG: 50 TABLET ORAL at 21:29

## 2024-01-29 RX ADMIN — NICOTINE POLACRILEX 4 MG: 2 GUM, CHEWING BUCCAL at 17:39

## 2024-01-29 RX ADMIN — RISPERIDONE 1 MG: 1 TABLET ORAL at 09:05

## 2024-01-29 RX ADMIN — NICOTINE POLACRILEX 4 MG: 2 GUM, CHEWING BUCCAL at 19:35

## 2024-01-29 RX ADMIN — NICOTINE POLACRILEX 4 MG: 2 GUM, CHEWING BUCCAL at 11:09

## 2024-01-29 RX ADMIN — NICOTINE POLACRILEX 4 MG: 2 GUM, CHEWING BUCCAL at 21:29

## 2024-01-29 RX ADMIN — NICOTINE POLACRILEX 4 MG: 2 GUM, CHEWING BUCCAL at 09:05

## 2024-01-29 RX ADMIN — TOPIRAMATE 200 MG: 100 TABLET ORAL at 09:05

## 2024-01-29 RX ADMIN — RISPERIDONE 1 MG: 1 TABLET ORAL at 21:29

## 2024-01-29 RX ADMIN — OLANZAPINE 15 MG: 10 TABLET ORAL at 21:29

## 2024-01-29 RX ADMIN — NICOTINE POLACRILEX 4 MG: 2 GUM, CHEWING BUCCAL at 13:11

## 2024-01-29 RX ADMIN — NICOTINE POLACRILEX 4 MG: 2 GUM, CHEWING BUCCAL at 15:12

## 2024-01-29 SDOH — HEALTH STABILITY: MENTAL HEALTH: ANXIETY SYMPTOMS: GENERALIZED

## 2024-01-29 SDOH — ECONOMIC STABILITY: HOUSING INSECURITY: FEELS SAFE LIVING IN HOME: YES

## 2024-01-29 SDOH — HEALTH STABILITY: MENTAL HEALTH: DEPRESSION SYMPTOMS: NO PROBLEMS REPORTED OR OBSERVED.

## 2024-01-29 ASSESSMENT — COLUMBIA-SUICIDE SEVERITY RATING SCALE - C-SSRS
2. HAVE YOU ACTUALLY HAD ANY THOUGHTS OF KILLING YOURSELF?: NO
1. SINCE LAST CONTACT, HAVE YOU WISHED YOU WERE DEAD OR WISHED YOU COULD GO TO SLEEP AND NOT WAKE UP?: NO
6. HAVE YOU EVER DONE ANYTHING, STARTED TO DO ANYTHING, OR PREPARED TO DO ANYTHING TO END YOUR LIFE?: NO

## 2024-01-29 ASSESSMENT — PAIN SCALES - GENERAL
PAINLEVEL_OUTOF10: 0 - NO PAIN
PAINLEVEL_OUTOF10: 0 - NO PAIN

## 2024-01-29 ASSESSMENT — LIFESTYLE VARIABLES: SUBSTANCE_ABUSE_PAST_12_MONTHS: NO

## 2024-01-29 ASSESSMENT — PAIN - FUNCTIONAL ASSESSMENT
PAIN_FUNCTIONAL_ASSESSMENT: 0-10
PAIN_FUNCTIONAL_ASSESSMENT: 0-10

## 2024-01-29 NOTE — PROGRESS NOTES
"Occupational Therapy    [facility] Occupational Therapy Assessment & Treatment    Patient Name: Hussain Villafana  MRN: [id number]  Today's Date: 1/29/2024      Activity:  The Coping Game Group    Attendance:  Attendance  Activity: Discussion/reminisce, Games  Participation: Active participation    Treatment Approach  Approach : Group therapy sessions, 30 to 45 minutes  Patient Stated Goals: none stated  Cognition: Attention, Directions (Pt alert, oriented, & attentive for duration of group. Follows complex multi-step directions w/ independence.)  Social Skills: Interacts with others with cues  Emotional: Mood (Pt mood & affect constricted)  Stress Management/Relaxation Training: Identifies benefits of stress management/relaxation techniques  Treatment Approach Comments: Pt attended therapy group focused on positive coping skills through participation in “The Coping Game”. Game involved pts taking turns, moving across game board, & being given prompts related to mental health awareness & positive coping skill usage. Pt engaged in game appropriately by following rules, taking turns, & giving meaningful responses to prompts. At EOS, pts asked to name at least 1 coping skill they’ve learned since being here that they want to try more in the future. Pt described how many of his existing coping skills are active/busy - exercising, cooking, etc. - & pt realizes he would benefit from practicing some coping skills more focused on the \"here & now\" & which promote better relaxation. Pt demonstrates good understanding of positive coping skills & motivation to use these skills to support occupational performance in daily life.      Encounter Problems       Encounter Problems (Active)       OT Goals       Communication/Interaction Skills (Self-expression/social Behavior/assertive) (Progressing)       Start:  01/27/24    Expected End:  02/24/24       Communication/Interaction Skills (Self-expression/social " Behavior/assertive)    Explore/demonstrate 1-2 effective methods of expressing feelings/wants/needs (can include assertion/engaging/sharing/asking) prior to discharge          Coping Skills (Progressing)       Start:  01/27/24    Expected End:  02/24/24       Coping Skills      Explore/identify 1-2 effective strategies of expressing feelings, wants, needs(can include assertion, engaging, sharing, asking) prior to discharge          OT Goal 3 (Progressing)       Start:  01/27/24    Expected End:  02/24/24        Emotion Regulation/self control      Pt will exhibit appropriate range, regulation of emotions, impulse control, frustration tolerance in OT groups prior to discharge.                Education:   Pt educated on various topics related to positive coping skills in the context of a board game. Pt demonstrates good understanding & motivation to use positive coping skills to support occupational performance in daily life.

## 2024-01-29 NOTE — PROGRESS NOTES
"Social Work Assessment and Discharge Planning Note     01/29/24 2144   Arrival Details   Admission Type   (inpatient psych)   History of Present Illness   Admission Reason Paranoia/ Pschosis         (a/v hallucinations)             Pt states \"I have been constantly troubled by irrational, conspiritual thoughts and wanted to seek help for them. Today, he denies SI, HI and a/v hallucinations.  He reports a decreasing in thoughts. Denies current depressive, anxiety, felicitas or sleep problems.   HPI Pt with hx of BiPolar Disorder, PTSD and schizophrenia here due to increased paranoia,  Came to ED as he felt he needed higher level of care. Was seeing ghosts, hearing voices the gov't was after him and more intense symptoms.   SW Readmission Information    Readmission within 30 Days No   Psychiatric Symptoms   Anxiety Symptoms Generalized   Depression Symptoms No problems reported or observed.  (when decompensating: irritable, eat and sleep less)   Felicitas Symptoms No problems reported or observed.   Additional Symptoms - Adult   Generalized Anxiety Disorder Sleep disturbance  (sweating and racing thoughts when decompensating)   Past Psychiatric History/Meds/Treatments   Past Psychiatric History Pt reports hx of [facility] and [facility] admissions, most recent 10/2022.  He reports they were helpful in helping him \"\"focus on the problem\" and become  more \"rational.\" Peter enrolled in [facility] 3 weeks,  3x a week at [facility].   Past Psychiatric Meds/Treatments Pt reportedly Rx compliant. When asked which meds he was taking, pt answered vaguely: \"Some worked to various degrees, some had side effects\"   Past Violence/Victimization History denied both   Current Mental Health Contacts   Provider Name/Phone Number [facility] - MARIE Whitney- CNP   Provider Last Appointment Date 1/25/24   Support System   Support System   (parents, AA sponsor Godwin, brother Ramsey (30))   Living Arrangement   Living Arrangement Apartment;Lives " "alone   Home Safety   Feels Safe Living in Home Yes   Income Information   Employment Status Unemployed  (1 month)   Income Source Family   Current/Previous Occupation   (serving)   Employment/ Finance Comments basic needs are met, parents hlep financially.  Pt has a car.   [place] Service/Education History   Current or Previous  Service None   Education Level   (Masters from [employer].)   Social/Cultural History   Social History Pt is single, heterosexual, has no children.  Was raised in [place] by parents.  Has brother Ramsey (30) and sister Priscila (33). Has ok relationshiop with Priscila, and is closer to Ramsey.   Cultural Requests During Hospitalization none   Spiritual Requests During Hospitalization none   Important Activities   (meditate, exercise, cook)   Legal   Legal Comments none   Drug Screening   Have you used any substances (canabis, cocaine, heroin, hallucinogens, inhalants, etc.) in the past 12 months? No   Stage of Change   Stage of Change Maintenance  (tox screen and BAL were negative)   History of Treatment   (has AA sponsor, currently attending co-dependency meetings.  Longest sobriety: 6 years)   Age of First Substance Use etoh  & THC - 13   Psychosocial   Behaviors/Mood Calm;Flat affect;Cooperative  (Pt describes himself as being \"at baseline\" and \"laid back\" , \"no highs, no lows, a three\" - with 10 being manic and 1 being depressed)   Thought Process   Delusions Paranoid  (conspirital thoughts)   Hallucination   (denied)         Pt  here with dx of Schizoaffective Disorder.  He sought out hospitalization when IOP was not managing his paranoia and psychosis.  Pt presents with very flat affect.      Pt's stressors include his symptoms and financial stress of no income, no job, as well as its toll on his self-esteem.  He reports feeling that he is a financial burden on his parents at a time when he wants to be economically independent.     Pt plans to return to his apartment in " [place].  He would like to switch to 's IOP, if possible.  If not, he is agreeable to continue with [facility]'s IOP where he is currently enrolled.

## 2024-01-29 NOTE — PROGRESS NOTES
"Occupational Therapy    [facility] Occupational Therapy Assessment & Treatment    Patient Name: Hussain Villafana  MRN: [id number]  Today's Date: 1/29/2024      Activity:  Self-Esteem & Core Beliefs    Attendance:  Attendance  Activity: Discussion/reminisce  Participation: Active participation    Treatment Approach  Approach : Group therapy sessions, 30 to 45 minutes  Patient Stated Goals: none stated  Cognition: Attention, Directions (Pt alert, oriented, & attentive for duration of session. Follows complex multi-step directions w/ indepenendence & fair accuracy.)  Social Skills: Interacts with others with cues  Emotional: Mood (Pt mood & affect constricted)  Stress Management/Relaxation Training: Identifies benefits of stress management/relaxation techniques  Treatment Approach Comments: Pt attended & participated in group therapy session focused on self-esteem & core beliefs. Pt provided self-esteem assessment & encouraged to complete at start of session. Pt participated in opening activity by filling out self-esteem assessment checklist, & was attentive during discussion of the meaning of self-assessment results. Pt also given educational handouts on self-esteem & self-acceptance, “the cognitive triangle”, and core beliefs. Reviewed & discussed. Connected core beliefs to cognitive distortions discussed in yesterday’s group. Pt participated in therapeutic activity by filling out prompts on Core Beliefs handout, which asked participants to write down one of their own negative core beliefs & find evidence against it. Pt shared his core beliefs & evidence w/ OT. Core belief was, \"I'm unemployable.\" Evidence against included statements about his skills & willingness to work. Finally, pt participated in discussion of how core beliefs & self-esteem influence performance in meaningful roles & occupations. Pt demonstrates emerging understanding.      Encounter Problems       Encounter Problems (Active)       OT Goals  "      Communication/Interaction Skills (Self-expression/social Behavior/assertive) (Progressing)       Start:  01/27/24    Expected End:  02/24/24       Communication/Interaction Skills (Self-expression/social Behavior/assertive)    Explore/demonstrate 1-2 effective methods of expressing feelings/wants/needs (can include assertion/engaging/sharing/asking) prior to discharge          Coping Skills (Progressing)       Start:  01/27/24    Expected End:  02/24/24       Coping Skills      Explore/identify 1-2 effective strategies of expressing feelings, wants, needs(can include assertion, engaging, sharing, asking) prior to discharge          OT Goal 3 (Progressing)       Start:  01/27/24    Expected End:  02/24/24        Emotion Regulation/self control      Pt will exhibit appropriate range, regulation of emotions, impulse control, frustration tolerance in OT groups prior to discharge.                Education:  Pt provided educational handouts on self-esteem, the cognitive triangle, & core beliefs. Reviewed & discussed. Pt demonstrates emerging understanding.

## 2024-01-29 NOTE — PROGRESS NOTES
Social Work Note  Pt expressed interest in IOP thru , rather than continuing with [facility].  Pt states he would like to try , even though it is virtual. Attempted to enroll pt, but he has to call himself. Gave him Homero's number at [facility] ([place]) [phone]. Demonstrated to pt of what to say. Later, pt stated he attempted to reach, but got voice mail and will try again.

## 2024-01-29 NOTE — PROGRESS NOTES
"Hussain Villafana is a 33 y.o. male on day 3 of admission presenting with schizoaffective disorder, bipolar type.    Subjective   Patient reports improved mood, states that he slept well.  Patient denies any auditory and visual hallucinations.  Patient paranoia also improving.  Patient has been active the unit participating group therapy.  Patient tolerating medication with no adverse effects.    Objective     MSE  General: Appropriately groomed and dressed.  Appearance: Appears stated age.  Attitude: Calm, cooperative.  Behavior: Appropriate eye contact.  Motor activity: No agitation or retardation. no EPS.  Normal gait.  Speech: Regular rate, rhythm, volume and tone.  Mood: Depressed  Affect: Flat  Thought process: Organized, linear, goal-directed.  Associations are logical.  Thought content: Does not endorse suicidal or homicidal ideation, paranoia, but improving  Thought perception: Did not endorse auditory or visual hallucinations.  Cognition: Alert, oriented x3.  no deficit in memory or attention.  Insight: Fair.  Judgment: Fair.    Last Recorded Vitals  /57   Pulse 60   Temp 36.6 °C (97.9 °F)   Resp 16   Ht 1.829 m (6' 0.01\")   Wt 88.5 kg (195 lb)   SpO2 98%   BMI 26.44 kg/m²      Relevant Results  Scheduled medications  OLANZapine, 15 mg, oral, Nightly  risperiDONE, 1 mg, oral, BID  topiramate, 200 mg, oral, Daily      Continuous medications     PRN medications  PRN medications: acetaminophen, alum-mag hydroxide-simeth, hydrOXYzine HCL, ibuprofen, nicotine polacrilex, OLANZapine, OLANZapine, traZODone    No results found for this or any previous visit (from the past 24 hour(s)).     Assessment/Plan   Diagnosis:  Schizoaffective disorder, bipolar type    Impression:     Labs and Chart: reviewed  Case discussed with treatment team members  Encouraged patient to attend group and other mileu activity  Collateral from family - pending  Discharge planing  Medication:       - Reviewed. To continue " as ordered    Medication Consent  Medication Consent: no medication changes necessary for review    Yoan Eastman, APRN-CNP

## 2024-01-30 VITALS
RESPIRATION RATE: 14 BRPM | HEART RATE: 42 BPM | DIASTOLIC BLOOD PRESSURE: 75 MMHG | SYSTOLIC BLOOD PRESSURE: 118 MMHG | OXYGEN SATURATION: 98 % | BODY MASS INDEX: 26.41 KG/M2 | TEMPERATURE: 98.4 F | WEIGHT: 195 LBS | HEIGHT: 72 IN

## 2024-01-30 PROCEDURE — 97530 THERAPEUTIC ACTIVITIES: CPT | Mod: GO

## 2024-01-30 PROCEDURE — 2500000004 HC RX 250 GENERAL PHARMACY W/ HCPCS (ALT 636 FOR OP/ED): Performed by: PSYCHIATRY & NEUROLOGY

## 2024-01-30 PROCEDURE — 2500000001 HC RX 250 WO HCPCS SELF ADMINISTERED DRUGS (ALT 637 FOR MEDICARE OP): Performed by: PSYCHIATRY & NEUROLOGY

## 2024-01-30 PROCEDURE — 99239 HOSP IP/OBS DSCHRG MGMT >30: CPT | Performed by: PSYCHIATRY & NEUROLOGY

## 2024-01-30 RX ORDER — RISPERIDONE 1 MG/1
1 TABLET ORAL 2 TIMES DAILY
Qty: 60 TABLET | Refills: 0 | Status: SHIPPED | OUTPATIENT
Start: 2024-01-30 | End: 2024-02-15 | Stop reason: WASHOUT

## 2024-01-30 RX ADMIN — NICOTINE POLACRILEX 4 MG: 2 GUM, CHEWING BUCCAL at 11:08

## 2024-01-30 RX ADMIN — NICOTINE POLACRILEX 4 MG: 2 GUM, CHEWING BUCCAL at 14:12

## 2024-01-30 RX ADMIN — NICOTINE POLACRILEX 4 MG: 2 GUM, CHEWING BUCCAL at 06:30

## 2024-01-30 RX ADMIN — RISPERIDONE 1 MG: 1 TABLET ORAL at 08:57

## 2024-01-30 RX ADMIN — NICOTINE POLACRILEX 4 MG: 2 GUM, CHEWING BUCCAL at 09:02

## 2024-01-30 RX ADMIN — TOPIRAMATE 200 MG: 100 TABLET ORAL at 08:57

## 2024-01-30 ASSESSMENT — PAIN - FUNCTIONAL ASSESSMENT: PAIN_FUNCTIONAL_ASSESSMENT: 0-10

## 2024-01-30 ASSESSMENT — PAIN SCALES - GENERAL: PAINLEVEL_OUTOF10: 0 - NO PAIN

## 2024-01-30 NOTE — DISCHARGE INSTR - APPOINTMENTS
Psychiatry [facility]  Tuesday 02/06/24,   Virtual appointment at 12:00pm  Stay by the phone and answer all calls.   (They will send you a link. )  Contact Homero in [facility] intake dept with any questions or if you don't get a link   [phone]

## 2024-01-30 NOTE — PROGRESS NOTES
"Occupational Therapy     REHAB Therapy & Treatment    Patient Name: Hussain Villafana  MRN: [id number]  Today's Date: 1/30/2024    Attendance  Activity: Discussion/reminisce, Relaxation therapy  Participation: Active participation    Therapeutic Activity  Treatment Approach  Approach : 30 to 45 minutes  Patient Stated Goals: none  Cognition: Attention  Emotional: Mood (Pt mood more euthymic)  Stress Management/Relaxation Training: Performs stress management/relaxation techniques, Utilizes stress management/relaxation techniques  Treatment Approach Comments: In therapeutic Act session: Pt noted the Mindfulness meditation, \"Life Saver\" technique.\" & (+) self-talk phrases helped. He quickly demonstrated, stretches & 4 acupressure techniques while talking to show OT that Pt was able to internalize the tips and tools effectively. Pt also noted a goal to be compliant w/meds and counseling after d/c for continued success in the management of stress.      Encounter Problems       Encounter Problems (Active)       OT Goals       Communication/Interaction Skills (Self-expression/social Behavior/assertive) (Progressing)       Start:  01/27/24    Expected End:  02/24/24       Communication/Interaction Skills (Self-expression/social Behavior/assertive)    Explore/demonstrate 1-2 effective methods of expressing feelings/wants/needs (can include assertion/engaging/sharing/asking) prior to discharge          Coping Skills (Progressing)       Start:  01/27/24    Expected End:  02/24/24       Coping Skills      Explore/identify 1-2 effective strategies of expressing feelings, wants, needs(can include assertion, engaging, sharing, asking) prior to discharge          OT Goal 3 (Progressing)       Start:  01/27/24    Expected End:  02/24/24        Emotion Regulation/self control      Pt will exhibit appropriate range, regulation of emotions, impulse control, frustration tolerance in OT groups prior to discharge.                    "

## 2024-01-30 NOTE — GROUP NOTE
Group Topic: Music Therapy   Group Date: 1/30/2024  Start Time: 1300  End Time: 1400  Facilitators: Sabrina Moore   Department: [facility] EXPRESSIVE THER VIRTUAL    Number of Participants: 4   Group Focus: feeling awareness/expression, music therapy, and rapport building  Treatment Modality: Music Therapy  Interventions Utilized were: active music engagement      Name: Hussain Villafana YOB: 1990   MR: [id number]      Level of Participation: active  Quality of Participation: appropriate/pleasant and motivated  Interactions with others: appropriate  Mood/Affect: blunted, flat, and positive  Cognition, Pre Treatment: attentive and capable  Cognition, Post Treatment: attentive and capable  Progress: Significant  Plan: continue with services

## 2024-01-30 NOTE — DISCHARGE SUMMARY
Discharge Diagnosis:  Schizoaffective disorder, bipolar type    Hospital Course:  Patient is a 33-year-old male with a history of schizoaffective disorder, bipolar type and PTSD who was admitted to [facility] for psychosis. Due to acutely elevated and imminent risk for self-harm/harm to others, patient required a level of care equivalent to inpatient hospitalization for safety, evaluation, treatment and stabilization.     The patient was admitted to [facility] under the care of Dr. Simental, restricted to the jeronimo and placed on suicide, behavior and elopement precautions.  At the beginning of hospitalization, patient presented to the ED with increasing symptoms of psychosis including auditory and visual hallucinations and paranoia.  Patient reports that he has been taking part in IOP at [facility], states it has been somewhat helpful due to his increase in symptoms he realized he needed to come to the hospital to get more help.  Patient reported he had not slept in the last 2 days.  Patient reported he was hearing voices telling him that the government was following him and wanted to arrest him.  Patient reported he was currently prescribed Zyprexa 15 mg oral daily at bedtime and Topamax 20 mg oral daily at bedtime.  Patient reports he was compliant with medications.    The treatment team made the following interventions: medication, group/milieu therapy, individual therapy    Over the course of hospitalization, patient reported improvement and objective signs of improvement were noted by staff.  Patient reported improvement in sleep.  Prior to discharge patient denied any paranoia, delusions, auditory visual hallucinations and showed no outward signs of psychosis.  Patient has started on Risperdal and titrated to 1 mg oral twice daily.  Discussed with the patient that in the future he could work with his outpatient provider to eventually titrate down Zyprexa and discontinue it, while utilizing  Risperdal to reduce the antipsychotic burden.  For now patient doing well on both medications showing no signs of EPS or any other adverse effects. patient is looking forward to continue to work with therapy and working on CBT skills.  Patient reports that he gained a lot from group therapy sessions while on the unit. Patient future oriented looking forward to spending time with family upon discharge.  Patient exhibited good insight into the importance of following with outpatient psychiatric services and maintaining medication compliance.    Psychiatric Medications:   Continue Zyprexa 50 mg oral daily at bedtime, continue Topamax 20 mg oral daily at bedtime, start Risperdal 1 mg oral twice daily.  Patient tolerated medications without side effects.    Prior to the date of discharge, patient was able to contract for safety and stated they felt safe and appropriate for discharge.  The treatment team found the patient not to be an imminent danger to self or others.  The patient denied suicidal or homicidal ideation and did not endorse auditory and visual hallucinations.  The patient's condition at the time of discharge was stable and initial symptoms improved over the course of hospitalization.    The patient was discharged home under the supervision of family with a 30-day supply of  Risperdal 1 mg oral twice daily.    The patient was instructed to call the patient's outpatient provider in the event of worsening symptoms or medication side effects.  Should the patient be unable to maintain their personal safety or the safety of others, instructions were provided to dial 9-1-1 or go to the closest emergency room.    Discharge Mental Status Exam:  General:  Patient is awake, alert, and oriented to person, place, time, and situation.    Appearance:  Appears well-hydrated, well-nourished, and well-groomed and approximately stated age.   Attitude:  Patient was calm and cooperative throughout the interview, which is  appropriate to the context of the interview and the topics discussed.   Behavior:  Eye contact is appropriate with topics of discussion.   Motor Activity:  Motor activity is normal. No psychomotor disturbances or abnormal involuntary movements were noted, including psychomotor agitation, psychomotor retardation, involuntary movements, extrapyramidal symptoms, akathisia, or tardive dyskinesia. Gait is normal.   Speech:  Speech is spontaneous, coherent, fluent and of appropriate quantity, rate, volume, and tone and non-vulgar/vulgar.  Speech and mannerisms are consistent with topics of discussion.   Affect:  Euthymic with a full range, mood congruent and appropriate to content.   Thought Process:  Thought process was linear, organized, and goal-directed and devoid of loose associations, flight of ideas, thought blocking or tangents.   Thought Content:  Thought content was devoid of suicidal ideation or intent, homicidal ideation or intent, self-harm ideation or intent, delusions, illusions, obsessions, or paranoia.   Thought Perception:  Did not endorse auditory or visual hallucinations. Patient did not appear to be internally distracted or preoccupied.   Cognition:  Knowledge and intelligence are believed to be average.  Recent and remote memory, fund of knowledge, and abstract reasoning appear grossly intact, appropriate for age and education, and there are no impairments in attention, concentration, or language.   Insight:  Insight regarding psychiatric conditions is good.   Judgment:  Judgment is good.    Recent Labs:  No results found for this or any previous visit (from the past 24 hour(s)).     Risk Assessment at Discharge:  Violence Risk Assessment: major mental illness and male  Acute Risk of Harm to Others is Considered: low   Risk Mitigated by: Adherence to treatment, strong therapeutic alliance. Follow-up.    Suicide Risk Assessment:  and current psychiatric illness  Protective Factors against  Suicide: adherence to  treatment, hopefulness/future orientation, sense of responsibility toward family, social support/connectedness, and strong coping skills  Acute Risk of Harm to Self is Considered: low  Risk Mitigated by: Adherence to treatment, strong therapeutic alliance. Follow-up.      Yoan Eastman, APRN-CNP

## 2024-01-30 NOTE — CARE PLAN
Problem: Sensory Perceptual Alteration as Evidenced by  Goal: Cooperates with admission process  Outcome: Progressing  Goal: Discusses signs/symptoms of illness/treatment options  Outcome: Progressing  Goal: Initiates reality-based interactions  Outcome: Progressing  Goal: Able to discuss content of hallucinations/delusions  Outcome: Progressing  Goal: Notifies staff when experiencing hallucinations/delusions  Outcome: Progressing  Goal: Verbalizes reduction in hallucinations/delusions  Outcome: Progressing  Goal: Will not act on psychotic perception  Outcome: Progressing  Goal: Free from restraint events  Outcome: Progressing     Problem: Alteration in Sleep  Goal: STG - Informs staff if unable to sleep  Outcome: Progressing     Problem: Self Care Deficit  Goal: STG - Patient completes hygiene  Outcome: Progressing   The patient's goals for the shift include      The clinical goals for the shift include      Pt admitted to [facility] under the services of Dr rahman. Pt transfer from [facility] and pt presents currently guarded and paranoid with auditory hallucinations. Pt refused to complete admitting process at this time. Pt wanted his nicotine patch and wanted to go to bed. Pt wand ed and vitals taken and pt offered a snack. Will continue to monitor.     
"Pt. States that he is feeling a little better today.  Pt. Has flat affect, apprehensive, rates his anxiety a four out of ten, with ten being the worst.  Pt. Has been calm and cooperative, compliant with unit milieu.  Pt. Denies having any suicidal or homicidal ideations.  Pt. Denies having any auditory or visual hallucinations today, no signs of internal stimulation observed.  Pt. States that he continues to have paranoid thoughts, but that these thoughts are less intrusive, is gaining insight that they \"are not real\".  Pt. Not voicing any paranoid or delusional thinking.  Pt. Compliant with medications, denies feeling any negative side effects.  Pt. States that he slept well last night.      The patient's goals for the shift include Go to groups    The clinical goals for the shift include Participate in group therapy meetings    Problem: Sensory Perceptual Alteration as Evidenced by  Goal: Cooperates with admission process  Outcome: Progressing  Goal: Discusses signs/symptoms of illness/treatment options  Outcome: Progressing  Goal: Initiates reality-based interactions  Outcome: Progressing  Goal: Able to discuss content of hallucinations/delusions  Outcome: Progressing  Goal: Notifies staff when experiencing hallucinations/delusions  Outcome: Progressing  Goal: Verbalizes reduction in hallucinations/delusions  Outcome: Progressing     Problem: Alteration in Sleep  Goal: STG - Informs staff if unable to sleep  Outcome: Progressing     Problem: Self Care Deficit  Goal: STG - Patient completes hygiene  Outcome: Progressing       "
The patient's goals for the shift include Go to dayroom    The clinical goals for the shift include Decrease in Internal stimulation    Patient denies SI, HI, and AVH although appearing to be internally stimulated at times. Patient remains in room throughout the beginning of the shift sitting in chair very guarded. Patient accepting of medications and nicotine patch switched with gum. Patient requests PRN for sleep and was able to sleep for the majority of the night uninterrupted.    Problem: Sensory Perceptual Alteration as Evidenced by  Goal: Initiates reality-based interactions  Outcome: Progressing  Goal: Verbalizes reduction in hallucinations/delusions  Outcome: Progressing  Goal: Will not act on psychotic perception  Outcome: Met  Goal: Free from restraint events  Outcome: Met       
The patient's goals for the shift include Go to dayroom    The clinical goals for the shift include Decrease in Internal stimulation    Patient denies SI, HI, or AVH despite appearing internally stimulated at times. Patient rates anxiety and depression a 4/10. Patient accepting of medications, and is seen out of room more often this evening. Patient does not socialize with others but his needs are able to be expressed to staff. Patient slept throughout the night without issue.    Problem: Alteration in Sleep  Goal: STG - Informs staff if unable to sleep  Outcome: Progressing     Problem: Self Care Deficit  Goal: STG - Patient completes hygiene  Outcome: Progressing       
The patient's goals for the shift include Go to dayroom    The clinical goals for the shift include Decrease in Internal stimulation    Patient denies SI, HI, or AVH. Patient engaged in exercise at the beginning of the shift per other RN. Patient remains in room most of the evening, and or pacing halls. Patient ate snack and requests medications at 2130.    Problem: Alteration in Sleep  Goal: STG - Informs staff if unable to sleep  Outcome: Progressing     Problem: Self Care Deficit  Goal: STG - Patient completes hygiene  Outcome: Met       
The patient's goals for the shift include To eat meals in dayroom with peers.    The clinical goals for the shift include Patient will report a decrease in psychotic symptoms this shift.    Patient denies SI, HI, A/VH. Patient appears to be internally stimulated. Mood anxious and depressed. Affect flat. Patient is guarded, maintains poor eye contact. Patient compliant with scheduled medication.        Problem: Sensory Perceptual Alteration as Evidenced by  Goal: Cooperates with admission process  Outcome: Progressing  Goal: Discusses signs/symptoms of illness/treatment options  Outcome: Progressing  Goal: Initiates reality-based interactions  Outcome: Progressing  Goal: Able to discuss content of hallucinations/delusions  Outcome: Progressing  Goal: Notifies staff when experiencing hallucinations/delusions  Outcome: Progressing  Goal: Verbalizes reduction in hallucinations/delusions  Outcome: Progressing  Goal: Will not act on psychotic perception  Outcome: Progressing  Goal: Free from restraint events  Outcome: Progressing     
The patient's goals for the shift include participate in group    The clinical goals for the shift include symptoms will remain managed throughout shift    Over the shift, the patient did make progress toward the following goals. He actively participated in group twice and spent time in the day room with staff and peers. He enjoyed a visit from his parents today. He denies any visual or auditory hallucinations throughout the shift. His mood is very pleasant and cooperative.     
Received report from TIFFANIE Mckeon RN. Received pt sleeping, no acute distress. On oxygen via n/c. Mother and grandmother @ bedside. ID band checked.

## 2024-02-05 ENCOUNTER — TELEPHONE (OUTPATIENT)
Dept: PRIMARY CARE | Facility: CLINIC | Age: 34
End: 2024-02-05
Payer: COMMERCIAL

## 2024-02-05 DIAGNOSIS — R11.0 NAUSEA: Primary | ICD-10-CM

## 2024-02-05 RX ORDER — ONDANSETRON 4 MG/1
4 TABLET, FILM COATED ORAL EVERY 8 HOURS PRN
Qty: 20 TABLET | Refills: 0 | Status: SHIPPED | OUTPATIENT
Start: 2024-02-05 | End: 2024-02-12

## 2024-02-05 NOTE — TELEPHONE ENCOUNTER
Spoke with patient on the phone for GI symptoms.    He was recently admitted to psychiatric unit for increasing psychosis and discharged on 1/30. Started on risperdal, continued on zyprexa and topiramate.     States that it was a good experience. He has an intake appointment for [facility] to start soon.    He was feeling better from a GI standpoint until Thursday-- developed vomiting, nausea, and diarrhea. Continued to have diarrhea Friday and Saturday. Yesterday, he had one solid BM and then had another episode of diarrhea and vomited.    Had another bout of emesis and one normal and one diarrhea stool today.    Feels he is at 60%. He cannot meditate, work our, or read.    He started taking miralax again on Thursday to see if this would help. He has not been taking any zofran. Not having any hard stools. No blood in stool. No abdominal pain.     Still eating a lot of fruit, Rx bar, dried fruit, stir avilez at night.     Recommended stopping miralax, using zofran PRN. Most likely IBS symptoms from eating outside his normal diet. Will get fecal calprotectin in a couple days if still having diarrhea.

## 2024-02-06 ENCOUNTER — APPOINTMENT (OUTPATIENT)
Dept: BEHAVIORAL HEALTH | Facility: CLINIC | Age: 34
End: 2024-02-06
Payer: COMMERCIAL

## 2024-02-07 ENCOUNTER — APPOINTMENT (OUTPATIENT)
Dept: RADIOLOGY | Facility: HOSPITAL | Age: 34
End: 2024-02-07
Payer: COMMERCIAL

## 2024-02-07 ENCOUNTER — APPOINTMENT (OUTPATIENT)
Dept: BEHAVIORAL HEALTH | Facility: CLINIC | Age: 34
End: 2024-02-07
Payer: COMMERCIAL

## 2024-02-07 ENCOUNTER — HOSPITAL ENCOUNTER (EMERGENCY)
Facility: HOSPITAL | Age: 34
Discharge: HOME | End: 2024-02-07
Attending: EMERGENCY MEDICINE
Payer: COMMERCIAL

## 2024-02-07 VITALS
SYSTOLIC BLOOD PRESSURE: 134 MMHG | HEART RATE: 74 BPM | BODY MASS INDEX: 26.41 KG/M2 | DIASTOLIC BLOOD PRESSURE: 71 MMHG | OXYGEN SATURATION: 96 % | RESPIRATION RATE: 18 BRPM | HEIGHT: 72 IN | WEIGHT: 195 LBS | TEMPERATURE: 97.3 F

## 2024-02-07 DIAGNOSIS — K52.9 GASTROENTERITIS: Primary | ICD-10-CM

## 2024-02-07 DIAGNOSIS — E87.1 HYPONATREMIA: ICD-10-CM

## 2024-02-07 LAB
ALBUMIN SERPL BCP-MCNC: 4.2 G/DL (ref 3.4–5)
ALP SERPL-CCNC: 94 U/L (ref 33–120)
ALT SERPL W P-5'-P-CCNC: 30 U/L (ref 10–52)
ANION GAP SERPL CALC-SCNC: 13 MMOL/L
APPEARANCE UR: CLEAR
AST SERPL W P-5'-P-CCNC: 28 U/L (ref 9–39)
BASOPHILS # BLD AUTO: 0.02 X10*3/UL (ref 0–0.1)
BASOPHILS NFR BLD AUTO: 0.3 %
BILIRUB SERPL-MCNC: 0.4 MG/DL (ref 0–1.2)
BILIRUB UR STRIP.AUTO-MCNC: NEGATIVE MG/DL
BUN SERPL-MCNC: 11 MG/DL (ref 6–23)
CALCIUM SERPL-MCNC: 9 MG/DL (ref 8.6–10.3)
CHLORIDE SERPL-SCNC: 95 MMOL/L (ref 98–107)
CO2 SERPL-SCNC: 21 MMOL/L (ref 21–32)
COLOR UR: ABNORMAL
CREAT SERPL-MCNC: 0.99 MG/DL (ref 0.5–1.3)
EGFRCR SERPLBLD CKD-EPI 2021: >90 ML/MIN/1.73M*2
EOSINOPHIL # BLD AUTO: 0.03 X10*3/UL (ref 0–0.7)
EOSINOPHIL NFR BLD AUTO: 0.4 %
ERYTHROCYTE [DISTWIDTH] IN BLOOD BY AUTOMATED COUNT: 11.9 % (ref 11.5–14.5)
FLUAV RNA RESP QL NAA+PROBE: NOT DETECTED
FLUBV RNA RESP QL NAA+PROBE: NOT DETECTED
GLUCOSE SERPL-MCNC: 94 MG/DL (ref 74–99)
GLUCOSE UR STRIP.AUTO-MCNC: NEGATIVE MG/DL
HCT VFR BLD AUTO: 37.5 % (ref 41–52)
HGB BLD-MCNC: 13 G/DL (ref 13.5–17.5)
IMM GRANULOCYTES # BLD AUTO: 0.02 X10*3/UL (ref 0–0.7)
IMM GRANULOCYTES NFR BLD AUTO: 0.3 % (ref 0–0.9)
KETONES UR STRIP.AUTO-MCNC: NEGATIVE MG/DL
LEUKOCYTE ESTERASE UR QL STRIP.AUTO: NEGATIVE
LIPASE SERPL-CCNC: 44 U/L (ref 9–82)
LYMPHOCYTES # BLD AUTO: 1.37 X10*3/UL (ref 1.2–4.8)
LYMPHOCYTES NFR BLD AUTO: 17.9 %
MCH RBC QN AUTO: 30.7 PG (ref 26–34)
MCHC RBC AUTO-ENTMCNC: 34.7 G/DL (ref 32–36)
MCV RBC AUTO: 89 FL (ref 80–100)
MONOCYTES # BLD AUTO: 0.7 X10*3/UL (ref 0.1–1)
MONOCYTES NFR BLD AUTO: 9.2 %
NEUTROPHILS # BLD AUTO: 5.51 X10*3/UL (ref 1.2–7.7)
NEUTROPHILS NFR BLD AUTO: 71.9 %
NITRITE UR QL STRIP.AUTO: NEGATIVE
NRBC BLD-RTO: 0 /100 WBCS (ref 0–0)
PH UR STRIP.AUTO: 7 [PH]
PLATELET # BLD AUTO: 180 X10*3/UL (ref 150–450)
POTASSIUM SERPL-SCNC: 3.4 MMOL/L (ref 3.5–5.3)
PROT SERPL-MCNC: 6.7 G/DL (ref 6.4–8.2)
PROT UR STRIP.AUTO-MCNC: NEGATIVE MG/DL
RBC # BLD AUTO: 4.23 X10*6/UL (ref 4.5–5.9)
RBC # UR STRIP.AUTO: NEGATIVE /UL
RSV RNA RESP QL NAA+PROBE: NOT DETECTED
SARS-COV-2 RNA RESP QL NAA+PROBE: NOT DETECTED
SODIUM SERPL-SCNC: 126 MMOL/L (ref 136–145)
SP GR UR STRIP.AUTO: 1
UROBILINOGEN UR STRIP.AUTO-MCNC: <2 MG/DL
WBC # BLD AUTO: 7.7 X10*3/UL (ref 4.4–11.3)

## 2024-02-07 PROCEDURE — 74177 CT ABD & PELVIS W/CONTRAST: CPT | Mod: FOREIGN READ | Performed by: RADIOLOGY

## 2024-02-07 PROCEDURE — 96361 HYDRATE IV INFUSION ADD-ON: CPT

## 2024-02-07 PROCEDURE — 85025 COMPLETE CBC W/AUTO DIFF WBC: CPT | Performed by: STUDENT IN AN ORGANIZED HEALTH CARE EDUCATION/TRAINING PROGRAM

## 2024-02-07 PROCEDURE — 81003 URINALYSIS AUTO W/O SCOPE: CPT | Performed by: EMERGENCY MEDICINE

## 2024-02-07 PROCEDURE — 2500000004 HC RX 250 GENERAL PHARMACY W/ HCPCS (ALT 636 FOR OP/ED): Performed by: EMERGENCY MEDICINE

## 2024-02-07 PROCEDURE — 36415 COLL VENOUS BLD VENIPUNCTURE: CPT | Performed by: STUDENT IN AN ORGANIZED HEALTH CARE EDUCATION/TRAINING PROGRAM

## 2024-02-07 PROCEDURE — 87637 SARSCOV2&INF A&B&RSV AMP PRB: CPT | Performed by: EMERGENCY MEDICINE

## 2024-02-07 PROCEDURE — 2550000001 HC RX 255 CONTRASTS: Performed by: EMERGENCY MEDICINE

## 2024-02-07 PROCEDURE — 96374 THER/PROPH/DIAG INJ IV PUSH: CPT | Mod: 59

## 2024-02-07 PROCEDURE — 96375 TX/PRO/DX INJ NEW DRUG ADDON: CPT

## 2024-02-07 PROCEDURE — 99284 EMERGENCY DEPT VISIT MOD MDM: CPT | Mod: 25,27 | Performed by: EMERGENCY MEDICINE

## 2024-02-07 PROCEDURE — 83690 ASSAY OF LIPASE: CPT | Performed by: EMERGENCY MEDICINE

## 2024-02-07 PROCEDURE — 74177 CT ABD & PELVIS W/CONTRAST: CPT

## 2024-02-07 PROCEDURE — 85025 COMPLETE CBC W/AUTO DIFF WBC: CPT | Performed by: EMERGENCY MEDICINE

## 2024-02-07 PROCEDURE — 99285 EMERGENCY DEPT VISIT HI MDM: CPT | Performed by: EMERGENCY MEDICINE

## 2024-02-07 PROCEDURE — 80053 COMPREHEN METABOLIC PANEL: CPT | Performed by: EMERGENCY MEDICINE

## 2024-02-07 RX ORDER — ONDANSETRON 4 MG/1
4 TABLET, ORALLY DISINTEGRATING ORAL EVERY 8 HOURS PRN
Qty: 20 TABLET | Refills: 0 | Status: SHIPPED | OUTPATIENT
Start: 2024-02-07 | End: 2024-03-11 | Stop reason: SINTOL

## 2024-02-07 RX ORDER — ONDANSETRON HYDROCHLORIDE 2 MG/ML
4 INJECTION, SOLUTION INTRAVENOUS ONCE
Status: COMPLETED | OUTPATIENT
Start: 2024-02-07 | End: 2024-02-07

## 2024-02-07 RX ORDER — KETOROLAC TROMETHAMINE 15 MG/ML
15 INJECTION, SOLUTION INTRAMUSCULAR; INTRAVENOUS ONCE
Status: COMPLETED | OUTPATIENT
Start: 2024-02-07 | End: 2024-02-07

## 2024-02-07 RX ADMIN — KETOROLAC TROMETHAMINE 15 MG: 15 INJECTION, SOLUTION INTRAMUSCULAR; INTRAVENOUS at 18:35

## 2024-02-07 RX ADMIN — SODIUM CHLORIDE, POTASSIUM CHLORIDE, SODIUM LACTATE AND CALCIUM CHLORIDE 1000 ML: 600; 310; 30; 20 INJECTION, SOLUTION INTRAVENOUS at 18:35

## 2024-02-07 RX ADMIN — SODIUM CHLORIDE 1000 ML: 9 INJECTION, SOLUTION INTRAVENOUS at 19:43

## 2024-02-07 RX ADMIN — IOHEXOL 75 ML: 350 INJECTION, SOLUTION INTRAVENOUS at 18:53

## 2024-02-07 RX ADMIN — ONDANSETRON 4 MG: 2 INJECTION INTRAMUSCULAR; INTRAVENOUS at 18:35

## 2024-02-07 ASSESSMENT — PAIN DESCRIPTION - PROGRESSION: CLINICAL_PROGRESSION: NOT CHANGED

## 2024-02-07 ASSESSMENT — PAIN DESCRIPTION - PAIN TYPE: TYPE: ACUTE PAIN

## 2024-02-07 ASSESSMENT — PAIN - FUNCTIONAL ASSESSMENT: PAIN_FUNCTIONAL_ASSESSMENT: 0-10

## 2024-02-07 ASSESSMENT — COLUMBIA-SUICIDE SEVERITY RATING SCALE - C-SSRS
2. HAVE YOU ACTUALLY HAD ANY THOUGHTS OF KILLING YOURSELF?: NO
1. IN THE PAST MONTH, HAVE YOU WISHED YOU WERE DEAD OR WISHED YOU COULD GO TO SLEEP AND NOT WAKE UP?: NO
6. HAVE YOU EVER DONE ANYTHING, STARTED TO DO ANYTHING, OR PREPARED TO DO ANYTHING TO END YOUR LIFE?: NO

## 2024-02-07 ASSESSMENT — PAIN DESCRIPTION - DESCRIPTORS
DESCRIPTORS: ACHING
DESCRIPTORS: ACHING

## 2024-02-07 ASSESSMENT — PAIN DESCRIPTION - FREQUENCY: FREQUENCY: INTERMITTENT

## 2024-02-07 ASSESSMENT — LIFESTYLE VARIABLES
EVER FELT BAD OR GUILTY ABOUT YOUR DRINKING: NO
EVER HAD A DRINK FIRST THING IN THE MORNING TO STEADY YOUR NERVES TO GET RID OF A HANGOVER: NO
HAVE YOU EVER FELT YOU SHOULD CUT DOWN ON YOUR DRINKING: NO
HAVE PEOPLE ANNOYED YOU BY CRITICIZING YOUR DRINKING: NO

## 2024-02-07 ASSESSMENT — PAIN SCALES - GENERAL
PAINLEVEL_OUTOF10: 4
PAINLEVEL_OUTOF10: 5 - MODERATE PAIN

## 2024-02-07 ASSESSMENT — PAIN DESCRIPTION - LOCATION: LOCATION: ABDOMEN

## 2024-02-07 ASSESSMENT — PAIN DESCRIPTION - ONSET: ONSET: ONGOING

## 2024-02-07 NOTE — ED PROVIDER NOTES
HPI   Chief Complaint   Patient presents with    Abdominal Pain    Vomiting    Diarrhea     X 6 weeks       33-year-old male presenting to the ED for abdominal pain.  Patient states for the past 6 weeks he has had nausea, vomiting and diarrhea almost daily.  Today he had several episodes of vomiting and subsequently developed diffuse abdominal pain.  States he has been evaluated by his PCP and urgent care several times with a past 6 weeks.  He has had outpatient x-rays that showed increased stool burden and was placed on MiraLAX by his PCP which initially did help his symptoms however after starting MiraLAX again recently did not have improvement of symptoms.  He was also initially on a course of antibiotics written by urgent care.  Has had negative outpatient stool studies.  States he also started multiple supplements including magnesium several days before his symptoms began initially in December.  He denies fevers, bloody stools, hematemesis.                          Jennyfer Coma Scale Score: 15                     Patient History   Past Medical History:   Diagnosis Date    Abdominal hernia 07/05/2022    Chronic prostatitis 07/12/2019    Continuous LLQ abdominal pain 07/07/2023    Dyspnea on exertion 07/07/2023    Epidermoid cyst of neck 07/05/2022    Generalized anxiety disorder 03/05/2018    Other chest pain 04/24/2020    Atypical chest pain    Personal history of leukemia 04/24/2020    History of lymphoid leukemia     Past Surgical History:   Procedure Laterality Date    OTHER SURGICAL HISTORY  07/16/2018    Central IV With Subcutaneous [place] Mediport Single Lumen    OTHER SURGICAL HISTORY  07/19/2021    Cyst excision    OTHER SURGICAL HISTORY  07/19/2021    Inguinal hernia repair    OTHER SURGICAL HISTORY  07/21/2021    Mass excision     No family history on file.  Social History     Tobacco Use    Smoking status: Former     Types: Cigarettes    Smokeless tobacco: Current   Vaping Use    Vaping Use: Not  on file   Substance Use Topics    Alcohol use: Not Currently     Comment: socially    Drug use: Never       Physical Exam   ED Triage Vitals [02/07/24 1746]   Temperature Heart Rate Respirations BP   36.3 °C (97.3 °F) 74 16 151/89      Pulse Ox Temp Source Heart Rate Source Patient Position   95 % Temporal Monitor Sitting      BP Location FiO2 (%)     Right arm --       Physical Exam  Vitals and nursing note reviewed.   Constitutional:       General: He is not in acute distress.     Appearance: He is well-developed. He is not ill-appearing or toxic-appearing.   HENT:      Head: Normocephalic and atraumatic.      Nose: Nose normal.      Mouth/Throat:      Mouth: Mucous membranes are moist.   Eyes:      Extraocular Movements: Extraocular movements intact.      Conjunctiva/sclera: Conjunctivae normal.   Cardiovascular:      Rate and Rhythm: Normal rate and regular rhythm.      Pulses: Normal pulses.      Heart sounds: Normal heart sounds.   Pulmonary:      Effort: Pulmonary effort is normal.      Breath sounds: Normal breath sounds.   Abdominal:      General: Bowel sounds are normal. There is no distension.      Palpations: Abdomen is soft.      Tenderness: There is no abdominal tenderness. There is no right CVA tenderness or left CVA tenderness.   Musculoskeletal:         General: Normal range of motion.      Cervical back: Normal range of motion and neck supple.      Right lower leg: No edema.      Left lower leg: No edema.   Skin:     General: Skin is warm and dry.      Capillary Refill: Capillary refill takes less than 2 seconds.   Neurological:      General: No focal deficit present.      Mental Status: He is alert and oriented to person, place, and time. Mental status is at baseline.         ED Course & MDM   ED Course as of 02/07/24 2149 Wed Feb 07, 2024 2147 Labs show a hyponatremia of 126.  This is a drop from his most recent labs however chart review shows that he does have prior history of hyponatremia.   He is also drinking large amounts of water due to concerns of dehydration with his vomiting and diarrhea.  This could be causing an iatrogenic hyponatremia.  He was treated with 2 L of IV fluids and was advised to follow-up with his PCP in the next 1 week for repeat labs to ensure improvement of his hyponatremia.  CT abdomen pelvis shows gastroenteritis.  He will be given a referral for GI to follow-up to discuss outpatient management of his chronic diarrhea.  Advised to stop the magnesium supplementation and MiraLAX.  Will return to the ED for worsening symptoms. [MJ]      ED Course User Index  [MJ] Brandi BooneyaDO         Diagnoses as of 02/07/24 2149   Gastroenteritis   Hyponatremia       Medical Decision Making  =================Attending note===============    The patient was seen by the resident/fellow.  I have personally performed a substantive portion of the encounter.  I have seen and examined the patient; agree with the workup, evaluation, MDM,   management and diagnosis.  The care plan has been discussed with the resident; I have reviewed the resident's note and agree with the documented findings.      This is a 33 y.o. male who presents to ER with nausea, vomiting, diarrhea.  Has been having the symptoms for 6 weeks.  He states today he developed abdominal pain.  States his symptoms started December 28 after he started taking some supplements.  He is still currently taking magnesium and he has been taking MiraLAX because he had stool on x-ray.  I advised him to stop the MiraLAX and the magnesium because this could be causing his diarrhea.  He was seen in urgent care and given antibiotics previously for this.  He seen his primary care doctor multiple times for this.  He did vomit twice today.  No fevers.  Patient does have a history of bipolar.  He denies any diabetes or hypertension hyperlipidemia.  Only surgical history is a hernia repair.    Heart regular.  Lungs clear.  Abdomen soft.  There is some  mild mid abdominal tenderness.  No guarding or rebound pain or peritoneal signs.  No flank tenderness.  He does not look dehydrated.    COVID and flu are negative.  Chemistry does show sodium of 126 which is lower than it was previously.  He has had some low sodiums in the past.  He has been drinking a lot of free water.  He is educated about decreasing his free water intake and adding sports drinks to his regimen.  Is advised to not only drink sports drinks because the excess sugar could also cause him to have diarrhea.  We did discuss proper hydration and decreasing free water intake.  He is also to increase his salt intake.  Lipase is normal.  No leukocytosis.  No significant anemia.    CT:  1. Minimal gastric and small bowel distention with air and fluid:   correlate for gastroenteritis.   2. Appendix is not localized.   3. No abscess, obstruction, or free air.   4. No renal calculi or hydronephrosis.   5. Remainder as above.     Given Rx for Zofran.  Patient discharged home.  To follow-up with primary care and GI.  He is happy with this plan.            ==========================================          Procedure  Procedures     Brandi Plunkett,   Resident  02/07/24 8865       Efren Watson,   02/08/24 2213

## 2024-02-08 LAB — HOLD SPECIMEN: NORMAL

## 2024-02-14 ENCOUNTER — TELEMEDICINE (OUTPATIENT)
Dept: BEHAVIORAL HEALTH | Facility: CLINIC | Age: 34
End: 2024-02-14
Payer: COMMERCIAL

## 2024-02-14 ENCOUNTER — APPOINTMENT (OUTPATIENT)
Dept: BEHAVIORAL HEALTH | Facility: CLINIC | Age: 34
End: 2024-02-14
Payer: COMMERCIAL

## 2024-02-14 DIAGNOSIS — F25.0 SCHIZOAFFECTIVE DISORDER, BIPOLAR TYPE (MULTI): ICD-10-CM

## 2024-02-14 PROCEDURE — 90791 PSYCH DIAGNOSTIC EVALUATION: CPT | Mod: GT,HE,U2

## 2024-02-14 PROCEDURE — 90791 PSYCH DIAGNOSTIC EVALUATION: CPT | Mod: U2,95

## 2024-02-14 NOTE — PROGRESS NOTES
Behavioral Health Intensive Outpatient Program   Intake Assessment     Time: 2:00pm            End: 2:43pm             Name: Hussain Villafana                         : 1990    DEMOGRAPHICS   Gender: Identifies as male yet endorsed exploring trans identity   Pronouns: He/him          Sexual Orientation: Heterosexual   Race: White   Ethnicity: American            Druze/Spiritual Orientation: Advent   Primary Language: English     REASON FOR REFERRAL  Referred to IOP by and for what reason: Patient was self-referred with a hx of Schizoaffective Disorder, bipolar type following a recent hospitalization.     CHIEF COMPLAINT  CHIEF COMPLAINT SUMMARY:  Hussain Villafana is a 33-year-old male with pronouns of he/him. Patient endorsed a hx of schizoaffective disorder, bipolar type. Patient endorsed stressors as a recent hospitalization, work stressors, lack of social support and ongoing mental health struggles. Patient denied SI, HI. Patient endorsed daily nicotine and caffeine use as well as weekly alcohol use. Patient denied concerns of alcohol use as patient has a hx of abusing alcohol.     Accompanied to appointment by:  Self    Pt. has given written permission to speak to the following regarding treatment in the IOP program:  Name: Denied                 Relationship:               Phone #:    Information in this assessment was obtained from the patient only: Yes    HISTORY OF PRESENT ILLNESS  What precipitated this most recent episode? Present stressors?   Patient endorsed recently being hospitalized at [facility] from 24-24 due to psychotic symptoms including governmental conspiracies including irrational thoughts of being following, feeling his phone was being tapped, thinking thousands of people are watching his social media accounts, and being fearful of being arrested. Patient shared believing he found information that a congressman has been laundering money and feels once other find  out he will be well known for this. Patient endorsed over the last few months these symptoms have been increasing and he recognized he needed help and reached out to go to the hospital. 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 Patient reports increasing his Risperidone medication and endorsed being consistent on his medications before the hospitalization.     Current Providers:    Psychiatrist: Denied - patient shared most recently working with an NP through [facility] but has not been consistent.   Therapist: Denied      When did you first experience mood or anxiety symptoms?   - Patient shared initially being diagnosed with bipolar disorder in the summer of 2010 and was then treated for that again in 2015. Patient shared an onset of thoughts surrounding a government conspiracies beginning in August of 2020.    Previous Outpatient Treatment:  Therapist: Ramsey Fajardo Kristin Filipowicz    Psychiatrist (or other med. Mgmt): Socrates Bradford     Past Psych Hospitalizations (where, when and why):  2024,  [place]    2021,  [place]     Past IOP/PHP Programs:  [facility] 2021    Suicidal ideation:  Patient denied hx of suicidal ideation. Patient endorsed hx of suicidal ideation most recently in the Winter of 2020. Patient denied a hx of suicide attempt.     Homicidal Ideation:  Patient denied homicidal ideation and denied hx of homicidal ideation.      Current self-harming behavior:   Patient denied current or hx of self-harming behaviors.     PSYCHOSOCIAL HISTORIES  PAST MEDICAL HISTORY:  Name of PCP: Dr. Bui   Last time you had a physical: January 2024     Falls Risk Assessment:   Pt. has history of falling - precautions will be taken in IOP group room and staff alerted. Endorsed collapsing with chest pain  last fall.     Medical Conditions:    Denied     Hx of Surgeries?  [place]aport in/out (1997/2011)     Adaptive equipment used:   Contacts, glasses     Pain evaluation:  Are you experiencing any physical pain right now? No     Allergies:  Food: Denied  Seasonal: Denied    Medication: Denied     Animals: Denied    Additional: Denied         Current Medications:  Zyprexa 15mg 1x daily   Topirimate 200mg 1x daily   Risperidone 1mg 2x daily     FAMILY HISTORY:   Family involved in patients care:   Yes, family is supportive.    Maternal Family History:  Living:  X  Patient denied hx of diagnoses with his mother. Patient endorsed a hx of a psychiatric stay with his maternal grandmother. Patient shared a hx of alcohol use on her mother's side of the family.      Paternal Family History   Living: X    Patient denied hx of mental health with his father. Patient endorsed hx of alcohol abuse with two cousins on his father's side of the family.     Siblings  Sisters:   Living: X  Patient denied hx of mental health with his sister.      Brothers:    Living: X    Patient denied hx of mental health with his brother.     Hx of suicides on either side of the family?   Who & Method: Denied     Children  Denied     FAMILY PSYCH HX SUMMARY: Patient denied diagnoses of mental health with his mother and father. Patient endorsed a hx of psychiatric admission with his maternal grandmother. Patient endorsed a hx of alcohol abuse with cousins on his father's side of the family. Patient denied hx of mental health with his siblings.     Fertility Hx:   N/A                                                                                                                                    History of postpartum depression   N/A     Social History   Born: [place]                                                    Raised: [place], [place]   What was it like growing up in your family? Patient shared being raised by his parents and his two  younger siblings. Patient shared being born in [place], [place] and moved to [place] at 6 months. Patient endorsed at age 4 moving to [place] and being here since.     Present Living Situation: Patient lives alone in an apartment.     Do you feel safe at home? Yes     Relationships  Any current partnerships or relationships? Denied     Any recent breakups? Denied     Tell me about your history of personal relationships. Any supportive groups, organizations or communities that you are a part of?   Patient identified supports as his parents and a close friend who was his sponsor through AA. Patient shared some supports through his CODA meeting but not sharing too much personal information. Patient shared some of his prior AA supports as good acquaintances of his.     WORK HISTORY  Education Hx:   Year of High School graduation or GED earned: Linh [employer]    Learning Disabilities (Current/Past): Denied     Technical Training: Denied     Higher Education (Bachelor's, Master's, Doctorate): Engineering at [employer], SINGH at [employer]     Hx of  Service:   What branch: Denied   Type and year of Discharge: N/A    **If still in Higher Education**  Are you currently attending classes? Denied   If they are on medical leave, first day of Medical Leave: N/A     Work Hx:  Are you currently working? 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      FMLA status:   Are you currently on FMLA? Are you planning on being on FMLA? N/A     How has your illness impacted your education or work performance?  Patient denied issues with work and endorsed not noticing difficulties in his work performance. Patient shared over time feeling he struggles to hold a job.     Financial situation:  Patient denied current financial stressors as his parents are helping to support him. Patient shared feeling like a burden to his parents due to them supporting him.     RISK BEHAVIOR HISTORY  Past & Present  Substance Hx:  Caffeine: Daily use (4 caffienated drinks a day)   Nicotine: Daily use (On pouches) 32mg at a time, 6-7 times a day   Alcohol (type): Endorsed occasional/social drinking 3x weekly, hx of alcohol abuse   Marijuana: Denied    Reyna/Ecstacy: Denied    Heroin: Denied    Opiates/Pain pills: Denied    Hallucinogens (LSD, mushrooms, synthetic): Denied    Stimulants/Cocaine: Denied    Over-the-counter or prescription meds (benzos): Denied    Other: Denied     Style of Use:   Do you find it hard to just have one drink? Denied   Do you take a night off from alcohol or substance use? No   Do you use substances to cope with your mood or anxiety? No     Consequences of substance use:   Have you ever hid your use of alcohol or substances? Yes in the past, not currently.   How has your alcohol or substance use impacted your relationships? Patient shared in the past but no current issues.   Do you have any physical or medical issues related to your substance use? Hangovers, blackouts, hospitalizations, arrests, and relationship problems.      Have you had any treatment for chemical dependency?    Denied treatment, endorsed hx of attending AA     Any other addictive behaviors? (overeating, gambling, video games)  Denied     GUNS/FIREARMS  Do you own guns or have access to firearms? Denied     LEGAL HISTORY:  Do you have any past or present legal problems?    Patient endorsed hx of issues in high school including charges of breaking & entering, theft. Patient endorsed going into an unlocked consession stand and taking candy, snacks, etc. Additionally patient shared being charged with public intoxication in [place], [place] and disorderly conduct in [place], [place].    NARRATIVE  PAST PSYCH HX:  Past Trauma Treatment:  Specific traumas experienced:  Physical: Denied   Emotional: Endorsed by mother.    Verbal: Denied   Sexual: Denied     Neglect: Denied   Domestic Violence: Denied      Recent losses or deaths: Patient denied  recent losses or deaths.      Any history of disordered eating or eating disorder diagnosis? Patient denied hx of disordered eating or hx of diagnoses.     SCORES AND SCALES  SCORES AND SCALES:  DONN 21: 15  MILLER: 15  BDI: 12  PHQ-9: 5  MAST: 20  DAST: 0  MDQ: Positive     How hard are you willing to work to get better from 0 to 10: 8     What barriers do you see interfering with your ability to attend IOP: Patient denied barriers to attending IOP.     Goals patient wants to work on while attending IOP: Patient identified goals for IOP including stopping irrational thoughts and learning ways to navigate and to work towards financial independence.     Biggest strengths and healthy coping strategies: Patient was unable to identify a personal strength yet identified meditation as a helpful coping strategy.     *ADMINISTER [place] SUICIDE SEVERITY RATING SCALE*   SAFE-T Protocol with C-SSRS  STEP 1: Identify Risk Factors  In the past month:  1) Wish to be dead - No   2) Current suicidal thoughts - No   3) Suicidal thoughts with method - No   4) Suicidal intent without specific plan - No   5) Intent with plan - No      C-SSRS Suicidal Behavior:  Have you ever done anything, started to do anything, or prepared to do anything to end your life?  Lifetime: No   Past 3 months: No      Current and Past Psychiatric Dx: Mood disorder, psychotic disorder      Presenting Symptoms: Anxiety and/or panic, psychosis, hopelessness or despair     Family History: Denied      Precipitants/Stressors: Triggering events leading to humiliation, shame and/or despair, inadequate social supports, perceived burden on others      Change in Treatment: Recent inpatient hospitalization, non-compliant or not receiving treatment      Access to lethal methods?: Denied ownging guns or hacing access to firearms.      STEP 2: Identify Protective Factors  Internal:  Ability to cope with stress - Yes   Frustration tolerance - Yes   Jainism beliefs - Yes    Fear of death or the actual act of killing self - Yes   Identifies reasons for living - Yes      External:  Cultural, spiritual and/or moral attitudes against suicide - No   Responsibilities to children - Yes   Beloved pets - Yes   Supportive social network of family or friends - Yes  Positive therapeutic relationships - No   Engaged in work or school - No     STEP 3: Specific questioning about thoughts, plans and suicidal intent  Rated on a severity scale of 1-5  Frequency (how many times have you had these thoughts?): 0  Duration (when you have the thoughts how long do they last?): 0  Controllability (could/can you stop thinking about killing yourself or wanting to die if you want to?): 0  Deterrents (are there things - anyone or anything - that stopped you from wanting to die or acting on thoughts of suicide?): 0  Reasons for ideation (what sort of reasons did you have for thinking about wanting to die or killing yourself?): 0     STEP 4: Guidelines to determine level of risk and develop interventions to lower risk level  High:  Moderate:  Low: X    SYMPTOMS  Depressive symptoms reported:  Patient reported depressive symptoms present during the same 2-week period including feeling of worthlessness, feelings of hopelessness, feelings of guilt, diminished ability to think or concentrate, decreased self-esteem, and withdrawn or social isolating behavior.      Anxiety symptoms reported:  Patient reported anxiety symptoms including difficulty to control worry, feeling restless or on edge, easily fatigued, irritability, physical symptoms including dizzy/lightheaded, palpitations/pounding heart/accelerated heart rate, nausea or abdominal distress, and fear of losing control.     Manic symptoms reported:  Patient reported a hx of manic symptoms including history of mood swings, expansive mood, irritable mood, increased restless energy, talkativeness/pressured speech, inflated self-esteem or grandiosity, decreased need  for sleep, racing thoughts, flight of ideas, distractibility, excessive involvement in activities that have a high potential for painful consequences and mood disturbance causes marked impairment in social or occupational functioning.      Psychotic symptoms reported:  Patient reported currently experiencing psychotic symptoms including auditory hallucinations, visual hallucinations, paranoid delusions, ideas of reference, thought broadcasting, and belief he will be harmed by an individual, organization or group. Patient shared a hx of grandiose delusions.     MENTAL STATUS EXAM  General appearance & grooming: Appropriate     Motor activity: Under active            Behavior: Cooperative       Adaptive Equipment: Denied    Speech: Appropriate,    Mood: Depressed/Flat      Affect: Mood Congruent, Flat       Thought process:  Appropriate, Vichy       Thought content: Appropriate     Cognition: No impairment, Orientation: Alert & Oriented x 3  Insight:  Aware of problem     Eye contact: Average     Judgment: Judgment impaired      Interview behavior: Appropriate       Hallucinations: Auditory, Visual    Delusions: Reference       PATIENT DISCUSSION/SUMMARY  PLAN:  Pt. presents with signs and symptoms of schizoaffective disorder, bipolar type. Pt. was educated about the IOP program and enrollment was recommended. Pt. is willing to attend IOP. Pt. Denied SI, HI at this time, not judged to be a risk to self or others. Pt. insurance information has been verified. Pt. will begin IOP on Monday 2/19/24 following medication management appointment.   GAB Reed

## 2024-02-15 ENCOUNTER — TELEMEDICINE (OUTPATIENT)
Dept: BEHAVIORAL HEALTH | Facility: CLINIC | Age: 34
End: 2024-02-15
Payer: COMMERCIAL

## 2024-02-15 DIAGNOSIS — F25.0 SCHIZOAFFECTIVE DISORDER, BIPOLAR TYPE (MULTI): Primary | ICD-10-CM

## 2024-02-15 DIAGNOSIS — C91.01 ACUTE LYMPHOBLASTIC LEUKEMIA (ALL) IN REMISSION (MULTI): ICD-10-CM

## 2024-02-15 DIAGNOSIS — Z79.899 MEDICATION MANAGEMENT: ICD-10-CM

## 2024-02-15 DIAGNOSIS — F43.10 PTSD (POST-TRAUMATIC STRESS DISORDER): ICD-10-CM

## 2024-02-15 PROCEDURE — 3008F BODY MASS INDEX DOCD: CPT | Performed by: NURSE PRACTITIONER

## 2024-02-15 PROCEDURE — 99214 OFFICE O/P EST MOD 30 MIN: CPT | Performed by: NURSE PRACTITIONER

## 2024-02-15 RX ORDER — OLANZAPINE 10 MG/1
10 TABLET ORAL NIGHTLY
Qty: 30 TABLET | Refills: 0 | Status: SHIPPED | OUTPATIENT
Start: 2024-02-15 | End: 2024-02-22 | Stop reason: WASHOUT

## 2024-02-15 RX ORDER — RISPERIDONE 1 MG/1
1.5 TABLET ORAL 2 TIMES DAILY
Qty: 90 TABLET | Refills: 0 | Status: SHIPPED | OUTPATIENT
Start: 2024-02-15 | End: 2024-02-22 | Stop reason: SDUPTHER

## 2024-02-15 ASSESSMENT — ENCOUNTER SYMPTOMS: CONSTITUTIONAL NEGATIVE: 1
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
## 2024-02-19 ENCOUNTER — CLINICAL SUPPORT (OUTPATIENT)
Dept: BEHAVIORAL HEALTH | Facility: CLINIC | Age: 34
End: 2024-02-19
Payer: COMMERCIAL

## 2024-02-19 DIAGNOSIS — F25.0 SCHIZOAFFECTIVE DISORDER, BIPOLAR TYPE (MULTI): ICD-10-CM

## 2024-02-19 PROCEDURE — 90853 GROUP PSYCHOTHERAPY: CPT | Mod: GT,HE,U2

## 2024-02-19 NOTE — GROUP NOTE
Group Topic: Goals   Group Date: 2/19/2024  Start Time:  9:00 AM  End Time: 10:00 AM  Facilitators: GAB Pascal; GAB Pham   [facility]    Number of Participants: 8   Group Focus: activities of daily living skills, check in, daily focus, and goals  Treatment Modality: Behavior Modification Therapy, Cognitive Behavioral Therapy, and Patient-Centered Therapy  Interventions utilized were exploration, problem solving, and support  Purpose: coping skills, self-care, and relapse prevention strategies    Name: Hussain Villafana YOB: 1990   MR: [id number]    This was a video IOP group on a HIPAA compliant platform. All patients were provided with informed consent.   Date: 2/19/2023  Time: 9-10a, Number of Patients: 8, User Name: [email or web address], Number of Staff: 2    Group topic(s): Group members started with introductions for 2 new group members followed by a reading about life not being an emergency. The group then spent time reviewing weekend SMART goal successes and struggles.     Kirby arrived about 15 minutes late to group. Today is his first day in IOP. He was attentive as other group members shared and processed their weekend SMART goals.    Primary Facilitator GAB Wolf-S, [facility]  Secondary Facilitator DALI Schmidt  Supervisor MARIA DOLORES WolfS, [facility]

## 2024-02-19 NOTE — DISCHARGE INSTRUCTIONS
Seek immediate medical attention if you develop:  worsening abdominal pain, new or worsening nausea, new or worsening vomiting, new or worsening diarrhea, chest pain, shortness of breath, pain with urination, problems urinating, fever, chills, weakness, or any new or worsening symptoms.   65

## 2024-02-19 NOTE — GROUP NOTE
Group Topic: Coping Skills   Group Date: 2/19/2024  Start Time: 10:00 AM  End Time: 11:00 AM  Facilitators: GAB Pham; GAB Pascal   [facility]    This was a video IOP group on a HIPAA compliant program. All patients were provided with informed consent.     Date: 2/19/2024, Time: 10-11a, Number of Patients: 8, Username: [email or web address], Number of Staff: 2  Group Topic(s): thought records/automatic thoughts. The group engaged in exploring the topic of the cognitive model and the connection of one's thoughts, emotions and behaviors. As a whole, patients then explored the components of a though record. Group discussion followed.     Name: Hussain Villafana YOB: 1990   MR: [id number]      Hussain was present and on topic. Patient presented with flat affect yet was nonverbally engaged throughout.   GAB Reed    Secondary facilitator: DALI Schmidt   CT supervised by GAB Fajardo-S, ATR

## 2024-02-19 NOTE — GROUP NOTE
Group Topic: Coping Skills   Group Date: 2/19/2024  Start Time: 11:00 AM  End Time: 12:00 PM  Facilitators: GAB Pham; GAB Pascal   [facility]    This was a video IOP group on a HIPAA compliant program. All patients were provided with informed consent.     Date: 2/19/2024, Time: 11a-12p, Number of Patients: 8, Username: [email or web address], Number of Staff: 1  Group Topic(s): thought records. The exercise engaged in exploring thought records by allowing patients to identify the situation, automatic thoughts, emotions and alternate responses. Alternate responses were challenged by asking evidence for and against, worst/best/most realistic outcomes, effect of the automatic thoughts and allowing for alternate perspectives. Group discussion and practice of the exercise followed.     Name: Hussain Villafana YOB: 1990   MR: [id number]      Kirby was on topic and present. Patient presented with flat affect and maintained attentiveness throughout.   GAB Reed    Secondary facilitator: DALI Schmidt  CT supervised by Amy Juarez, GAB-S, ATR

## 2024-02-20 ENCOUNTER — CLINICAL SUPPORT (OUTPATIENT)
Dept: BEHAVIORAL HEALTH | Facility: CLINIC | Age: 34
End: 2024-02-20
Payer: COMMERCIAL

## 2024-02-20 DIAGNOSIS — F25.0 SCHIZOAFFECTIVE DISORDER, BIPOLAR TYPE (MULTI): ICD-10-CM

## 2024-02-20 PROCEDURE — 90853 GROUP PSYCHOTHERAPY: CPT | Mod: GT,HE,U2

## 2024-02-20 NOTE — GROUP NOTE
Group Topic: Coping Skills   Group Date: 2/20/2024  Start Time: 11:00 AM  End Time: 12:00 PM  Facilitators: Joan Nicholson PsyD; GAB Pascal   Department:  [facility]    Number of Participants: 6   Group Focus: coping skills and other Cognitive Distortions   Treatment Modality: Cognitive Behavioral Therapy and Psychoeducation  Interventions utilized were patient education  Purpose: coping skills    This was a video IOP group on a HIPAA compliant platform. All patients were provided with informed consent.    Group topic: Cognitive Distortions  Participants practiced identifying cognitive distortions in various examples of automatic thoughts. They worked to find most common distortions from personal experiences and reframed their thinking using realistic, adaptive and positive thoughts.   Joan Nicholson Psy.D.  Secondary facilitator: DALI Schmidt  Supervisor Amy Juarez, [facility]-S, ATR      Name: Hussain Villafana YOB: 1990   MR: [id number]      Hussain was present and actively engaged in discussion. He identified ways to reframe his thinking related to paranoid thoughts.

## 2024-02-20 NOTE — GROUP NOTE
Group Topic: Coping Skills   Group Date: 2/20/2024  Start Time: 10:00 AM  End Time: 11:00 AM  Facilitators: Joan Nicholson PsyD; GAB Pascal   Department:  [facility]    Number of Participants: 6   Group Focus: coping skills and other Cognitive distortions  Treatment Modality: Cognitive Behavioral Therapy and Psychoeducation  Interventions utilized were patient education  Purpose: coping skills    This was a video IOP group on a HIPAA compliant platform. All patients were provided with informed consent.    Group topic: Cognitive distortions  Participants shared how they practiced self-care over the week.    Group members then received education on the CBT Model, automatic thoughts and cognitive distortions. They identified examples of cognitive distortions in their own thinking.   Joan Nicholson Psy.D.  Secondary facilitator: DALI Schmidt  Supervisor Amy Juarez, [facility]-S, ATR      Name: Hussain Villafana YOB: 1990   MR: [id number]      Kirby was present and actively engaged listened to discussion.

## 2024-02-21 ENCOUNTER — CLINICAL SUPPORT (OUTPATIENT)
Dept: BEHAVIORAL HEALTH | Facility: CLINIC | Age: 34
End: 2024-02-21
Payer: COMMERCIAL

## 2024-02-21 DIAGNOSIS — F25.0 SCHIZOAFFECTIVE DISORDER, BIPOLAR TYPE (MULTI): ICD-10-CM

## 2024-02-21 PROCEDURE — 90853 GROUP PSYCHOTHERAPY: CPT | Mod: GT,HE,U2

## 2024-02-21 NOTE — GROUP NOTE
Group Topic: Feeling Awareness/Expression   Group Date: 2/20/2024  Start Time:  9:00 AM  End Time: 10:00 AM  Facilitators: GAB Pascal; Joan Nicholson PsyD   [facility]    Number of Participants: 6   Group Focus: feeling awareness/expression and social skills  Treatment Modality: Cognitive Behavioral Therapy, Patient-Centered Therapy, and Psychoeducation  Interventions utilized were exploration, group exercise, and patient education  Purpose: feelings, communication skills, and insight or knowledge    Name: Hussain Villafana YOB: 1990   MR: [id number]    This was a video IOP group on a HIPAA compliant platform. All patients were provided with informed consent.     Date:2/20/24 Time: 9:00am-10:00am, Number of Patients: 6 , User Name:[email or web address],  Number of Staff: 2  Group topic(s): Psychoeducation of the 5 love languages. Each was identified and defined. Group members identified their preferred ways of giving and receiving affection.    Kirby was present on camera. He appeared attentive as the information was provided to the group. He shared his father enjoys making things through wood working and it has been explained to him that this is his father's preferred love language. He tries to connect with him through these handmade gifts.    Provider Signature: SADIQ Wolf [facility]  Secondary facilitator(s): DALI Schmidt  Supervisor SADIQ Wolf [facility]

## 2024-02-21 NOTE — GROUP NOTE
Group Topic: Feeling Awareness/Expression   Group Date: 2/21/2024  Start Time: 10:00 AM  End Time: 11:00 AM  Facilitators: GAB Pascal; GAB Pham   Department: [facility]    Number of Participants: 7   Group Focus: feeling awareness/expression, mindfulness, and self-awareness  Treatment Modality: Behavior Modification Therapy, Cognitive Behavioral Therapy, Psychoeducation, and Skills Training  Interventions utilized were exploration, group exercise, and patient education  Purpose: feelings and insight or knowledge    Name: Hussain Villafana YOB: 1990   MR: [id number]    This session was conducted via HIPAA compliant video. Patient was provided with informed consent.    Date: 2/21/24  Time: 10:00 am to 11:00 am  Group Members: 7  Number of Staff: 2  [email or web address]    Group Topic: Developing emotional intelligence  The group watched a TedTalk to help them understand how emotions are felt in the body. They learned about the importance of practicing staying present with unpleasant emotions.     Patient was present on camera and was observed watching the video. He shared really enjoying the video. He was open about his sobriety for the last 10 years and that by working a recovery program he has learned to sit with unpleasant emotions. He shared he struggles with shame and frustration the most. He shared trying to trust his higher power and let go but struggling to do this. He was encouraged to revisit steps 2 and 3 with his sponsor.    Primary Facilitator - SADIQ Wolf, [facility]  Secondary Facilitator - Preston Menjivar, [place]  Supervisor SADIQ Wolf, [facility]

## 2024-02-21 NOTE — GROUP NOTE
Group Topic: Feeling Awareness/Expression   Group Date: 2/21/2024  Start Time: 11:00 AM  End Time: 12:00 PM  Facilitators: GAB Pascal; GAB Pham   Department: [facility]    Number of Participants: 7   Group Focus: coping skills, feeling awareness/expression, mindfulness, and self-awareness  Treatment Modality: Cognitive Behavioral Therapy, Patient-Centered Therapy, and Psychoeducation  Interventions utilized were exploration, group exercise, and patient education  Purpose: feelings and insight or knowledge    Name: Hussain Villafana YOB: 1990   MR: [id number]    This session was conducted via HIPAA compliant video. Patient was provided with informed consent.    Date: 2/21/24  Time: 11:00 am to 12:00 pm  Group Members: 7  Number of Staff: 2  [email or web address]    Group Topic: The group focused on developing self-awareness of physical responses to various emotions such as anger, fear, sadness and happiness. Group members identified and wrote down their personal physical reactions for each emotion and shared out loud.     Patient was present on camera and was observed writing during the exercise.  He was open to sharing how these various emotions present themselves to his physically. He shared learning that happiness is also an emotion that comes and goes and he is striving towards living a life where he feels content and inner peace rather than constant happiness.    Primary Facilitator - SADIQ Wolf, [facility]  Secondary Facilitator - Preston Menjivar, [place]  Supervisor SADIQ Wolf, [facility]

## 2024-02-21 NOTE — GROUP NOTE
Group Topic: Feeling Awareness/Expression   Group Date: 2/21/2024  Start Time:  9:00 AM  End Time: 10:00 AM  Facilitators: GAB Pham; GAB Pascal   [facility]    This was a video IOP group on a HIPAA compliant platform. All patients were provided with informed consent.     Date: 2/21/2024, Time: 9-10a, Number of Patients: 7, User Name: [email or web address],  Number of Staff: 2  Group topic(s): brianna, thorn, bud reflection. The group engaged in exploring a reflection of their week thus far by identifying their brianna (a positive, a highlight or a success), thorn (a challenge or something you can use more support with) and a bud (new idea or something you're looking forward to knowing or understanding more). Group discussion followed.      Name: Hussain Villafana YOB: 1990   MR: [id number]      Ikrby arrived a few minutes late but was present and attentive while in group. Patient engaged fully and appropriately in exercise and discussion. Patient shared a brianna of the week as having a nice dinner with his father last night, a thorn of lacking clarity on his career/job path, and a bud of having a planned phone call with his cousin who is planning to help him navigate his career difficulty.   GAB Reed    Secondary facilitator: Preston Menjivar, [place]   CT supervised by Amy Juarez, GAB-S, ATR

## 2024-02-22 ENCOUNTER — TELEMEDICINE (OUTPATIENT)
Dept: BEHAVIORAL HEALTH | Facility: CLINIC | Age: 34
End: 2024-02-22
Payer: COMMERCIAL

## 2024-02-22 ENCOUNTER — CLINICAL SUPPORT (OUTPATIENT)
Dept: BEHAVIORAL HEALTH | Facility: CLINIC | Age: 34
End: 2024-02-22
Payer: COMMERCIAL

## 2024-02-22 DIAGNOSIS — Z79.899 MEDICATION MANAGEMENT: ICD-10-CM

## 2024-02-22 DIAGNOSIS — F43.10 PTSD (POST-TRAUMATIC STRESS DISORDER): ICD-10-CM

## 2024-02-22 DIAGNOSIS — F25.0 SCHIZOAFFECTIVE DISORDER, BIPOLAR TYPE (MULTI): ICD-10-CM

## 2024-02-22 DIAGNOSIS — F25.0 SCHIZOAFFECTIVE DISORDER, BIPOLAR TYPE (MULTI): Primary | ICD-10-CM

## 2024-02-22 PROCEDURE — 99214 OFFICE O/P EST MOD 30 MIN: CPT | Performed by: NURSE PRACTITIONER

## 2024-02-22 PROCEDURE — 90853 GROUP PSYCHOTHERAPY: CPT | Mod: GT,HE,U2

## 2024-02-22 PROCEDURE — 3008F BODY MASS INDEX DOCD: CPT | Performed by: NURSE PRACTITIONER

## 2024-02-22 RX ORDER — RISPERIDONE 2 MG/1
2 TABLET ORAL 2 TIMES DAILY
Qty: 60 TABLET | Refills: 0 | Status: SHIPPED | OUTPATIENT
Start: 2024-02-22 | End: 2024-02-29 | Stop reason: WASHOUT

## 2024-02-22 ASSESSMENT — ENCOUNTER SYMPTOMS: CONSTITUTIONAL NEGATIVE: 1

## 2024-02-22 NOTE — GROUP NOTE
Group Topic: Personal Responsibility   Group Date: 2/22/2024  Start Time: 11:00 AM  End Time: 12:00 PM  Facilitators: GAB Pham; GAB Pascal   [facility]    This was a video IOP group on a HIPAA compliant program. All patients were provided with informed consent.     Date: 2/22/2024, Time: 11a-12p, Number of Patients: 8, Username: [email or web address], Number of Staff: 2  Group Topic(s): communication styles. The group continued conversation on positive and negative communication skills and explored types of communicating including passive, aggressive, passive aggressive and assertive. As a whole the group identified physical presentation, emotional components, behavioral cues, and beliefs within the different types of communication. Group engaged in small group discussion on their own communication styles.     Name: Hussain Villafana YOB: 1990   MR: [id number]      Kirby was on topic and present. Patient was engaged fully and participated appropriately. 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   GAB Reed    Secondary facilitator: DALI Schmidt   CT supervised by GAB Fajardo-S, ATR
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
## 2024-02-23 ENCOUNTER — DOCUMENTATION (OUTPATIENT)
Dept: BEHAVIORAL HEALTH | Facility: CLINIC | Age: 34
End: 2024-02-23
Payer: COMMERCIAL

## 2024-02-23 NOTE — GROUP NOTE
Group Topic: Goals   Group Date: 2/22/2024  Start Time:  9:00 AM  End Time: 10:00 AM  Facilitators: GAB Pham   Department:  [facility]        Name: Hussain Villafana YOB: 1990   MR: [id number]      This was a video IOP group on a HIPAA compliant platform. All patients were provided with informed consent.   Date: 02/22/2024, Time: 9-10a, Number of Patients: 8, User Name: SADIQ Guerin, ATR, Number of Staff: 2  Group topic(s): SMART goal setting group today. Group members identified achievement goals, enjoyment goals, and social connection goals - as well as anticipated obstacles and coping skills. Prior to writing and discussing goals, the group spent some time identifying their current emotions on a feelings wheel. Kirby identified feeling overwhelmed and insecure. He set goals to work on his resume, go to work, try to get to know others at work, call his mom, read the paper, and exercise. He anticipates his main obstacle to be his depressive symptoms. Kirby plans to use exercise and meditation as coping skills over the weekend.     Primary Facilitator: DALI Schmidt  Supervised by SADIQ Wolf, [facility]     Secondary Facilitator: SADIQ Wolf, [facility]

## 2024-02-23 NOTE — PROGRESS NOTES
INTENSIVE OUTPATIENT PROGRAM MULTIDISCIPLINARY  TREATMENT PLAN & PROGRESS NOTE     Patient: Hussain Villafana : 1990 Age: 33     Student: No  Treatment Team Date: 24 MRN#: [id number]   Attending Physician: Dr. RICKY Red MD  Date of IOP Admission: 24 Ref by: Self   Week: 1                        Admission Scores:   DASS21: 15 MILLER: 15 BDI: 12 PHQ-9: 5 MAST: 20 DAST: 0 MDQ:  Positive      Current Risk Assessment:  Suicidal Risk:         None:   X     Ideation:       Plan:      Intent:      SI Plan with plan contracts for safety:     Hx of harming self:        Homicidal Risk:     None:   X     Ideation:       Plan:      Intent:      HI Plan with means:                                     Hx of harming others:          Global Improvement  Clinical Global Impressions Rating Scale Of Illness:  6  1-No Illness Present   2-Minimal   3-Mild   4-Moderate   5-Marked   6-Severe X    7-Very Severe    Diagnoses & ICD-10 Codes  Primary Diagnosis & Code: Schizoaffective Disorder, Bipolar Type; F25.0     Psychosocial & Environmental Stressors:   1. Recent psychiatric hospitalization   2. Lack of social support   3. Work/career difficulties     Patient's Treatment Goals:                 Date Initiated:           Progress Update:               1.  Attend and actively participate in IOP _4_ days/week for 3 hours per day  2024   Good    Fair   Poor  Unclear X   2.  Attend weekly medication management sessions and maintain compliance of medications  2024      Good    Fair   Poor  Unclear X                                                   3.  Identify symptoms of diagnoses and developing appropriate coping plans  2024   Good    Fair   Poor  Unclear X                               4.  Decreasing irrational thoughts and reintegrating into daily life  2024  Good    Fair   Poor  Unclear X      Current medications:  See EMR chart       Progress note:  _X_New to the IOP program, attending as  planned  __Compliant, Progressing & Improving - Needs more time in IOP therapy program   __Compliant, Progressing & Improving Planning Discharge   __Compliant, Not Progressing or Improving: Reason  __Non-Compliant, not progressing or improving: Plan  __Other:    Insurance & Benefits: [place] Medicaid, IN NETWORK, BENEFITS ARE BASED ON MEDICAL NECESSITY ONLY: Unlimited visits, covers 100% of the contracted rate after deductible has been met.     Co-Pay per day: $0    DEDUCTIBLE        Single: / Family: / Accumulation:  Single: / Family: /  CO- INSURANCE  Single: / Family: / Accumulation:  Single: /  Family: /    OUT OF POCKET  Single: / Family: / Accumulation:  Single: /  Family: /     Total IOP Sessions completed & authorized to date:  4    Discharge plans have been discussed with patient & medication management provider on:   Reason for discharge:    ___Successfully completed IOP treatment:   ___Pt. decided to seek treatment elsewhere:   ___Dropped out or no show more than three times:  ___Benefits exhausted:   ___Other:    Discharge date:                     Discharge Prognosis: Excellent      Good     Fair     Poor    Follow up appointment with: Physician:   Follow up appointment with: Therapist:    Follow up Aftercare group?  Yes   Patient provided with Crisis Hotline phone number for suicide prevention? Yes     Discharge Scores: Not Completed    Emergency Treatment Plan Completed by patient: Yes__  No__          DASS21:   MILLER:   BDI:   Treatment plan electronically signed by Treatment Team:   GABRIEL Castorena, LPCC-S, [facility], PABLO Plasencia, LPCC, GABRIEL Nicholson, PsMickey, GABRIEL Gaytan, APRN-CNP

## 2024-02-26 ENCOUNTER — CLINICAL SUPPORT (OUTPATIENT)
Dept: BEHAVIORAL HEALTH | Facility: CLINIC | Age: 34
End: 2024-02-26
Payer: COMMERCIAL

## 2024-02-26 DIAGNOSIS — F25.0 SCHIZOAFFECTIVE DISORDER, BIPOLAR TYPE (MULTI): ICD-10-CM

## 2024-02-26 PROBLEM — K52.9 GASTROENTERITIS: Status: ACTIVE | Noted: 2024-02-26

## 2024-02-26 PROBLEM — K59.09 OTHER CONSTIPATION: Status: ACTIVE | Noted: 2024-02-26

## 2024-02-26 PROCEDURE — 90853 GROUP PSYCHOTHERAPY: CPT | Mod: GT,HE,U2

## 2024-02-26 NOTE — PROGRESS NOTES
Gastroenterology Office Visit     History of Present Illness:   Hussain Villafana is a 33 y.o. male who presents to GI clinic for chronic diarrhea and nausea.  He was seen in the ED 2/7/2024 for abdominal pain, vomiting, and diarrhea.  Be hyponatremic and CT was concerning for gastroenteritis.  He was taking a lot of MiraLAX at that time as well as magnesium which she was advised to stop.      Last colonoscopy: N/A  Last endosopy: N/A    No history of abdominal surgeries    Review of Systems  Review of Systems    Past Medical History   has a past medical history of Abdominal hernia (07/05/2022), Chronic prostatitis (07/12/2019), Continuous LLQ abdominal pain (07/07/2023), Dyspnea on exertion (07/07/2023), Epidermoid cyst of neck (07/05/2022), Generalized anxiety disorder (03/05/2018), Other chest pain (04/24/2020), and Personal history of leukemia (04/24/2020).     Past Surgical History  Past Surgical History:   Procedure Laterality Date    OTHER SURGICAL HISTORY  07/16/2018    Central IV With Subcutaneous McDougal Mediport Single Lumen    OTHER SURGICAL HISTORY  07/19/2021    Cyst excision    OTHER SURGICAL HISTORY  07/19/2021    Inguinal hernia repair    OTHER SURGICAL HISTORY  07/21/2021    Mass excision       Social History   reports that he has quit smoking. His smoking use included cigarettes. He uses smokeless tobacco. He reports that he does not currently use alcohol. He reports that he does not use drugs.     Family History  family history is not on file.   No family history of stomach or colon cancer    Allergies  No Known Allergies    Medications  Current Outpatient Medications   Medication Instructions    ondansetron ODT (ZOFRAN-ODT) 4 mg, oral, Every 8 hours PRN    risperiDONE (RISPERDAL) 2 mg, oral, 2 times daily    topiramate (TOPAMAX) 200 mg, oral, Daily        Objective   There were no vitals taken for this visit.     Physical Exam    LABS  Pertinent labs were reviewed with the patient  Lab  "Results   Component Value Date    WBC 7.7 02/07/2024    WBC 8.1 01/26/2024    WBC 6.0 01/18/2024    HGB 13.0 (L) 02/07/2024    HGB 13.9 01/26/2024    HGB 14.8 01/18/2024    HCT 37.5 (L) 02/07/2024    HCT 39.9 (L) 01/26/2024    HCT 42.8 01/18/2024     02/07/2024     01/26/2024     01/18/2024      Lab Results   Component Value Date     (L) 02/07/2024    K 3.4 (L) 02/07/2024    CL 95 (L) 02/07/2024    CO2 21 02/07/2024    BUN 11 02/07/2024    CREATININE 0.99 02/07/2024    GLUCOSE 94 02/07/2024    CALCIUM 9.0 02/07/2024      Lab Results   Component Value Date    ALKPHOS 94 02/07/2024    ALKPHOS 94 01/26/2024    ALKPHOS 104 01/18/2024    BILITOT 0.4 02/07/2024    BILITOT 0.6 01/26/2024    BILITOT 0.5 01/18/2024    BILIDIR 0.1 08/29/2023    BILIDIR 0.1 10/22/2020    PROT 6.7 02/07/2024    PROT 6.7 01/26/2024    PROT 7.6 01/18/2024    ALT 30 02/07/2024    ALT 37 01/26/2024    ALT 31 01/18/2024    AST 28 02/07/2024    AST 49 (H) 01/26/2024    AST 29 01/18/2024      Lab Results   Component Value Date    INR 1.0 04/06/2023      No results found for: \"IRON\", \"TIBC\"   No results found for: \"FERRITIN\"   Lab Results   Component Value Date    TSH 0.64 01/28/2024    FREET4 1.30 07/17/2020        Radiology  Pertinent radiology was reviewed with the patient  CT abdomen pelvis with contrast 2/7/2024  IMPRESSION:  1. Minimal gastric and small bowel distention with air and fluid:  correlate for gastroenteritis.  2. Appendix is not localized.  3. No abscess, obstruction, or free air.  4. No renal calculi or hydronephrosis.  5. Remainder as above.    Endoscopy  Colonoscopy   NA    EGD   NA    Assessment/Plan   Hussain Villafana is a 33 y.o. male who presents to GI clinic for for chronic diarrhea and nausea.  He was seen in the ED 2/7/2024 for abdominal pain, vomiting, and diarrhea.  Be hyponatremic and CT was concerning for gastroenteritis.  He was taking a lot of MiraLAX at that time as well as magnesium " which she was advised to stop.    No problem-specific Assessment & Plan notes found for this encounter.      {There are no diagnoses linked to this encounter. (Refresh or delete this SmartLink)}     ***refresh   Anastasia Wilkins PA-C

## 2024-02-27 ENCOUNTER — APPOINTMENT (OUTPATIENT)
Dept: BEHAVIORAL HEALTH | Facility: CLINIC | Age: 34
End: 2024-02-27
Payer: COMMERCIAL

## 2024-02-27 NOTE — GROUP NOTE
Group Topic: Feeling Awareness/Expression   Group Date: 2/26/2024  Start Time: 11:00 AM  End Time: 12:00 PM  Facilitators: GAB Pham; GAB Pascal   Department: [facility]        Name: Hussain Villafana YOB: 1990   MR: [id number]      This was a video IOP group on a HIPAA compliant platform. All patients were provided with informed consent.   Date: 02/26/2024, Time: 11a-12p, Number of Patients: 6, User Name: Truong [facility], ATR, Number of Staff: 2  Group topic(s): Today's topic was self-compassion. After a few participants shared their SMART goals, the group engaged in a general conversation around the concept of self-compassion. This was followed by a psychoeducational piece on the concept generally. The group then completed an activity where they wrote a letter to a friend and then to themselves, and we analyzed the differences in compassion as a group. Lastly, the group picked out some positive aspirations that they believe in as a take-home message. Kirby did not share about personal experience in the 11am hour but offered support and encouragement to various group members.     Primary Facilitator: DALI Schmidt  Supervised by SADIQ Wolf, [facility]     Secondary Facilitator: Heena Plasencia [facility] CenterJAMES

## 2024-02-27 NOTE — GROUP NOTE
Group Topic: Feeling Awareness/Expression   Group Date: 2/26/2024  Start Time: 10:00 AM  End Time: 11:00 AM  Facilitators: GAB Pham; GAB Pascal   Department: [facility]        Name: Hussain Villafana YOB: 1990   MR: [id number]      This was a video IOP group on a HIPAA compliant platform. All patients were provided with informed consent.   Date: 02/26/2024, Time: 10-11a, Number of Patients: 7, User Name: Truong, [facility]-S, ATR, Number of Staff: 2  Group topic(s): Today's topic was self-compassion. After a few participants shared their SMART goals, the group engaged in a general conversation around the concept of self-compassion. This was followed by a psychoeducational piece on the concept generally. The group then completed an activity where they wrote a letter to a friend and then to themselves, and we analyzed the differences in compassion as a group. Lastly, the group picked out some positive aspirations that they believe in as a take-home message. Kirby shared his SMART goals during the 10am. He was able to engage with co-workers more, go to his parents' house for dinner, write, and exercise 2x. He was not able to work on his resume. He found the cognitive restructuring CBT skill helpful while at work and experiencing some paranoia/anxiety.     Primary Facilitator: DALI Schmidt  Supervised by GAB Wolf-S, [facility]     Secondary Facilitator: Heena Plasencia [facility]

## 2024-02-28 ENCOUNTER — DOCUMENTATION (OUTPATIENT)
Dept: BEHAVIORAL HEALTH | Facility: CLINIC | Age: 34
End: 2024-02-28
Payer: COMMERCIAL

## 2024-02-28 ENCOUNTER — CLINICAL SUPPORT (OUTPATIENT)
Dept: BEHAVIORAL HEALTH | Facility: CLINIC | Age: 34
End: 2024-02-28
Payer: COMMERCIAL

## 2024-02-28 DIAGNOSIS — F25.0 SCHIZOAFFECTIVE DISORDER, BIPOLAR TYPE (MULTI): ICD-10-CM

## 2024-02-28 PROCEDURE — 90853 GROUP PSYCHOTHERAPY: CPT | Mod: GT,HE,U2

## 2024-02-28 NOTE — PROGRESS NOTES
I met with pt. to discuss progress in IOP and set goals.   Pt. reported experiencing a difficult week where he has felt lost career wise.   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   Pt. discussed initial manic episode in 2010 with parents and was hurt by their responses but understood that they were coming from a place of reality and love.   I will continue to follow-up while pt. is enrolled in  IOP.    DALI Schmidt  Supervised by Amy Juarez, GAB, ATR

## 2024-02-28 NOTE — GROUP NOTE
Group Topic: Goals   Group Date: 2/26/2024  Start Time:  9:00 AM  End Time: 10:00 AM  Facilitators: GAB Pascal; GAB Pham   [facility]    Number of Participants: 7   Group Focus: check in, daily focus, and goals  Treatment Modality: Behavior Modification Therapy, Cognitive Behavioral Therapy, and Patient-Centered Therapy  Interventions utilized were group exercise, problem solving, and support  Purpose: coping skills, insight or knowledge, and self-care    Name: Hussain Villafana YOB: 1990   MR: [id number]    This was a video IOP group on a HIPAA compliant platform. All patients were provided with informed consent.   Date: 1/26/2023  Time: 9-10a, Number of Patients: 7, User Name: [email or web address], Number of Staff: 2    Group topic(s): Group members started with an icebreaker question. The group then spent time reviewing weekend SMART goal successes and struggles.     Kirby was present on camera and appeared attentive. He shared feeling a bit fatigued but pretty neutral and steady this morning. He did not have an opportunity to share about his weekend SMART goals during this hour. He offered supportive feedback to a fellow group member struggling emotionally.     Primary Facilitator SADIQ Wolf, JACQUIKY  Secondary Facilitator DALI Schmidt  Supervisor SADIQ Wolf, [facility]

## 2024-02-29 ENCOUNTER — CLINICAL SUPPORT (OUTPATIENT)
Dept: BEHAVIORAL HEALTH | Facility: CLINIC | Age: 34
End: 2024-02-29
Payer: COMMERCIAL

## 2024-02-29 ENCOUNTER — TELEMEDICINE (OUTPATIENT)
Dept: BEHAVIORAL HEALTH | Facility: CLINIC | Age: 34
End: 2024-02-29
Payer: COMMERCIAL

## 2024-02-29 ENCOUNTER — DOCUMENTATION (OUTPATIENT)
Dept: BEHAVIORAL HEALTH | Facility: CLINIC | Age: 34
End: 2024-02-29

## 2024-02-29 DIAGNOSIS — F25.0 SCHIZOAFFECTIVE DISORDER, BIPOLAR TYPE (MULTI): ICD-10-CM

## 2024-02-29 DIAGNOSIS — Z79.899 MEDICATION MANAGEMENT: ICD-10-CM

## 2024-02-29 DIAGNOSIS — F25.0 SCHIZOAFFECTIVE DISORDER, BIPOLAR TYPE (MULTI): Primary | ICD-10-CM

## 2024-02-29 DIAGNOSIS — F43.10 PTSD (POST-TRAUMATIC STRESS DISORDER): ICD-10-CM

## 2024-02-29 PROCEDURE — 90853 GROUP PSYCHOTHERAPY: CPT | Mod: GT,HE,U2

## 2024-02-29 PROCEDURE — 99214 OFFICE O/P EST MOD 30 MIN: CPT | Performed by: NURSE PRACTITIONER

## 2024-02-29 PROCEDURE — 3008F BODY MASS INDEX DOCD: CPT | Performed by: NURSE PRACTITIONER

## 2024-02-29 RX ORDER — RISPERIDONE 3 MG/1
3 TABLET ORAL 2 TIMES DAILY
Qty: 60 TABLET | Refills: 0 | Status: SHIPPED | OUTPATIENT
Start: 2024-02-29 | End: 2024-04-04 | Stop reason: SDUPTHER

## 2024-02-29 ASSESSMENT — ENCOUNTER SYMPTOMS: CONSTITUTIONAL NEGATIVE: 1

## 2024-02-29 NOTE — GROUP NOTE
Group Topic: Goals   Group Date: 2/29/2024  Start Time:  9:00 AM  End Time: 10:00 AM  Facilitators: GAB Pham; GAB Pascal   Department: [facility]        Name: Hussain Villafana YOB: 1990   MR: [id number]      This was a video IOP group on a HIPAA compliant platform. All patients were provided with informed consent.   Date: 02/29/2024, Time: 9-10a, Number of Patients: 7, User Name: SADIQ Guerin, ATR, Number of Staff: 2  Group topic(s): SMART goal setting group today. Group members identified achievement goals, enjoyment goals, and social connection goals - as well as anticipated obstacles and coping skills. Prior to writing and discussing goals, the group spent some time processing events over the past 24 hours. Kirby set goals to work, ask for 2 more nights of work, make a blueprint for the next 6 weeks, email his mom, meet up with friend, and read. He anticipates obstacles of brain fog and lack of clarity. Kirby identified coping skills of exercise, meditation, and calling a friend.    Primary Facilitator: DALI Schmidt  Supervised by SADIQ Wolf, PABLO     Secondary Facilitator: SADIQ Wolf, [facility]

## 2024-02-29 NOTE — GROUP NOTE
Group Topic: Coping Skills   Group Date: 2/29/2024  Start Time: 11:00 AM  End Time: 12:00 PM  Facilitators: GAB Pham; GAB Pascal   [facility]    This was a video IOP group on a HIPAA compliant platform. All patients were provided with informed consent.     Date: 2/29/2024, Time: 11a-12p, Number of Patients: ,7 User Name: [email or web address],  Number of Staff: 2  Group topic(s): assertiveness. The group explored the communication styles of assertiveness and connected it with prior conversation on interpersonal conflict. Examples of assertiveness were explored including the assertion formula and the what, when, where, why, and how techniques were discussed. Individually, patients created their own outline for the assertion formula and group discussion followed.      Name: Hussain Villafana YOB: 1990   MR: [id number]      Kirby was on topic and attentive. Patient was present and nonverbally engaged throughout exercise. Patient participated in written exercise appropriately.   GAB Reed    Secondary facilitator: DALI Schmidt   CT supervised by GAB Fajardo-S, ATR

## 2024-02-29 NOTE — PROGRESS NOTES
INTENSIVE OUTPATIENT PROGRAM MULTIDISCIPLINARY  TREATMENT PLAN & PROGRESS NOTE     Patient: Hussain Villafana : 1990 Age: 33     Student: No  Treatment Team Date: 24 MRN#: [id number]   Attending Physician: Dr. RICKY Red MD  Date of IOP Admission: 24 Ref by: Self   Week: 2                        Admission Scores:   DASS21: 15 MILLER: 15 BDI: 12 PHQ-9: 5 MAST: 20 DAST: 0 MDQ:  Positive      Current Risk Assessment:  Suicidal Risk:         None:   X     Ideation:       Plan:      Intent:      SI Plan with plan contracts for safety:     Hx of harming self:        Homicidal Risk:     None:   X     Ideation:       Plan:      Intent:      HI Plan with means:                                     Hx of harming others:          Global Improvement  Clinical Global Impressions Rating Scale Of Illness:  6  1-No Illness Present   2-Minimal   3-Mild   4-Moderate   5-Marked   6-Severe X    7-Very Severe    Diagnoses & ICD-10 Codes  Primary Diagnosis & Code: Schizoaffective Disorder, Bipolar Type; F25.0     Psychosocial & Environmental Stressors:   1. Recent psychiatric hospitalization   2. Lack of social support   3. Work/career difficulties     Patient's Treatment Goals:                 Date Initiated:           Progress Update:               1.  Attend and actively participate in IOP _4_ days/week for 3 hours per day  2024   Good X    Fair      Poor  Unclear    2.  Attend weekly medication management sessions and maintain compliance of medications  2024      Good X    Fair      Poor  Unclear                                                    3.  Identify symptoms of diagnoses and developing appropriate coping plans  2024   Good       Fair X   Poor  Unclear                                4.  Decreasing irrational thoughts and reintegrating into daily life  2024  Good       Fair X   Poor  Unclear       Current medications:  See EMR chart       Progress note:  __New to the IOP program, attending as  planned  _X_Compliant, Progressing & Improving - Needs more time in IOP therapy program   __Compliant, Progressing & Improving Planning Discharge   __Compliant, Not Progressing or Improving: Reason  __Non-Compliant, not progressing or improving: Plan  __Other:    Insurance & Benefits: [place] Medicaid, IN NETWORK, BENEFITS ARE BASED ON MEDICAL NECESSITY ONLY: Unlimited visits, covers 100% of the contracted rate after deductible has been met.     Co-Pay per day: $0    DEDUCTIBLE        Single: / Family: / Accumulation:  Single: / Family: /  CO- INSURANCE  Single: / Family: / Accumulation:  Single: /  Family: /    OUT OF POCKET  Single: / Family: / Accumulation:  Single: /  Family: /     Total IOP Sessions completed & authorized to date:  7    Discharge plans have been discussed with patient & medication management provider on:   Reason for discharge:    ___Successfully completed IOP treatment:   ___Pt. decided to seek treatment elsewhere:   ___Dropped out or no show more than three times:  ___Benefits exhausted:   ___Other:    Discharge date:                     Discharge Prognosis: Excellent      Good     Fair     Poor    Follow up appointment with: Physician:   Follow up appointment with: Therapist:    Follow up Aftercare group?  Yes   Patient provided with Crisis Hotline phone number for suicide prevention? Yes     Discharge Scores: Not Completed    Emergency Treatment Plan Completed by patient: Yes__  No__          DASS21:   MILLER:   BDI:   Treatment plan electronically signed by Treatment Team:   GABRIEL Castorena, LPCC-S, LICDC, PABLO Plasencia, LPCC, GABRIEL Nicholson, PsMickey, GABRIEL Gaytan, APRN-CNP
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
## 2024-02-29 NOTE — GROUP NOTE
Group Topic: Coping Skills   Group Date: 2/29/2024  Start Time: 10:00 AM  End Time: 11:00 AM  Facilitators: GAB Pham; GAB Pascal   [facility]    This was a video IOP group on a HIPAA compliant program. All patients were provided with informed consent.     Date: 2/29/24, Time: 10-11a, Number of Patients: 7, Username: [email or web address], Number of Staff: 2  Group Topic(s): interpersonal conflict; types of conflict. The group began by reviewing prior conversation on communication styles and began exploring the idea of conflict and how we often navigate interpersonal difficulty. As a whole, patients explored different types of conflict including fact based conflict, value based conflict, ego-based conflict, policy conflict and psuedoconflict. Group discussion followed. As a whole, patients explored different types of styles of conflict management including collaborating, competing, avoiding, accommodating and compromising styles. Group then continued to explore tips for communicating and better listening skills. Group discussion followed.       Name: Hussain Villafana YOB: 1990   MR: [id number]      Kirby was on topic and present. Patient was attentive and appropriately engaged. Patient followed along in group exercise and discussion.   GAB Reed    Secondary facilitator: DALI Schmidt   CT supervised by Amy Juarez, CLAUC-S, ATR

## 2024-03-04 ENCOUNTER — APPOINTMENT (OUTPATIENT)
Dept: BEHAVIORAL HEALTH | Facility: CLINIC | Age: 34
End: 2024-03-04
Payer: COMMERCIAL

## 2024-03-04 ENCOUNTER — HOSPITAL ENCOUNTER (OUTPATIENT)
Dept: RADIOLOGY | Facility: EXTERNAL LOCATION | Age: 34
Discharge: HOME | End: 2024-03-04

## 2024-03-04 DIAGNOSIS — K52.9 GASTROENTERITIS: ICD-10-CM

## 2024-03-04 DIAGNOSIS — R07.89 ATYPICAL CHEST PAIN: ICD-10-CM

## 2024-03-05 ENCOUNTER — APPOINTMENT (OUTPATIENT)
Dept: GASTROENTEROLOGY | Facility: CLINIC | Age: 34
End: 2024-03-05
Payer: COMMERCIAL

## 2024-03-05 ENCOUNTER — APPOINTMENT (OUTPATIENT)
Dept: BEHAVIORAL HEALTH | Facility: CLINIC | Age: 34
End: 2024-03-05
Payer: COMMERCIAL

## 2024-03-06 ENCOUNTER — APPOINTMENT (OUTPATIENT)
Dept: BEHAVIORAL HEALTH | Facility: CLINIC | Age: 34
End: 2024-03-06
Payer: COMMERCIAL

## 2024-03-07 ENCOUNTER — TELEMEDICINE (OUTPATIENT)
Dept: BEHAVIORAL HEALTH | Facility: CLINIC | Age: 34
End: 2024-03-07
Payer: COMMERCIAL

## 2024-03-07 ENCOUNTER — DOCUMENTATION (OUTPATIENT)
Dept: BEHAVIORAL HEALTH | Facility: CLINIC | Age: 34
End: 2024-03-07

## 2024-03-07 ENCOUNTER — APPOINTMENT (OUTPATIENT)
Dept: BEHAVIORAL HEALTH | Facility: CLINIC | Age: 34
End: 2024-03-07
Payer: COMMERCIAL

## 2024-03-07 DIAGNOSIS — Z79.899 MEDICATION MANAGEMENT: ICD-10-CM

## 2024-03-07 DIAGNOSIS — F25.0 SCHIZOAFFECTIVE DISORDER, BIPOLAR TYPE (MULTI): Primary | ICD-10-CM

## 2024-03-07 DIAGNOSIS — F43.10 PTSD (POST-TRAUMATIC STRESS DISORDER): ICD-10-CM

## 2024-03-07 PROCEDURE — 99213 OFFICE O/P EST LOW 20 MIN: CPT | Performed by: NURSE PRACTITIONER

## 2024-03-07 PROCEDURE — 3008F BODY MASS INDEX DOCD: CPT | Performed by: NURSE PRACTITIONER

## 2024-03-07 ASSESSMENT — ENCOUNTER SYMPTOMS: CONSTITUTIONAL NEGATIVE: 1
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
## 2024-03-08 ENCOUNTER — DOCUMENTATION (OUTPATIENT)
Dept: BEHAVIORAL HEALTH | Facility: CLINIC | Age: 34
End: 2024-03-08
Payer: COMMERCIAL

## 2024-03-08 NOTE — PROGRESS NOTES
INTENSIVE OUTPATIENT PROGRAM MULTIDISCIPLINARY  TREATMENT PLAN & PROGRESS NOTE     Patient: Hussain Villafana : 1990 Age: 33     Student: No  Treatment Team Date: 3/4/24 MRN#: [id number]   Attending Physician: Dr. RICKY Red MD  Date of IOP Admission: 24 Ref by: Self   Week: 3                        Admission Scores:   DASS21: 15 MILLER: 15 BDI: 12 PHQ-9: 5 MAST: 20 DAST: 0 MDQ:  Positive      Current Risk Assessment:  Suicidal Risk:         None:   X     Ideation:       Plan:      Intent:      SI Plan with plan contracts for safety:     Hx of harming self:        Homicidal Risk:     None:   X     Ideation:       Plan:      Intent:      HI Plan with means:                                     Hx of harming others:          Global Improvement  Clinical Global Impressions Rating Scale Of Illness:  6  1-No Illness Present   2-Minimal   3-Mild   4-Moderate   5-Marked   6-Severe X    7-Very Severe    Diagnoses & ICD-10 Codes  Primary Diagnosis & Code: Schizoaffective Disorder, Bipolar Type; F25.0     Psychosocial & Environmental Stressors:   1. Recent psychiatric hospitalization   2. Lack of social support   3. Work/career difficulties     Patient's Treatment Goals:                 Date Initiated:           Progress Update:               1.  Attend and actively participate in IOP _4_ days/week for 3 hours per day  2024   Good     Fair    Poor  Unclear X    2.  Attend weekly medication management sessions and maintain compliance of medications  2024      Good     Fair    Poor  Unclear X                                                    3.  Identify symptoms of diagnoses and developing appropriate coping plans  2024   Good     Fair    Poor  Unclear X                                4.  Decreasing irrational thoughts and reintegrating into daily life  2024  Good     Fair    Poor  Unclear X       Current medications:  See EMR chart       Progress note:  __New to the IOP program, attending as  planned  __Compliant, Progressing & Improving - Needs more time in IOP therapy program   __Compliant, Progressing & Improving Planning Discharge   __Compliant, Not Progressing or Improving: Reason  __Non-Compliant, not progressing or improving: Plan  _X_Other: Pt. Paused enrollment due to significant gastrointestinal issues.     Insurance & Benefits: [place] Medicaid, [place] NETWORK, BENEFITS ARE BASED ON MEDICAL NECESSITY ONLY: Unlimited visits, covers 100% of the contracted rate after deductible has been met.     Co-Pay per day: $0    DEDUCTIBLE        Single: / Family: / Accumulation:  Single: / Family: /  CO- INSURANCE  Single: / Family: / Accumulation:  Single: /  Family: /    OUT OF POCKET  Single: / Family: / Accumulation:  Single: /  Family: /     Total IOP Sessions completed & authorized to date:  7    Discharge plans have been discussed with patient & medication management provider on:   Reason for discharge:    ___Successfully completed IOP treatment:   ___Pt. decided to seek treatment elsewhere:   ___Dropped out or no show more than three times:  ___Benefits exhausted:   ___Other:    Discharge date:                     Discharge Prognosis: Excellent      Good     Fair     Poor    Follow up appointment with: Physician:   Follow up appointment with: Therapist:    Follow up Aftercare group?  Yes   Patient provided with Crisis Hotline phone number for suicide prevention? Yes     Discharge Scores: Not Completed    Emergency Treatment Plan Completed by patient: Yes__  No__          DASS21:   MILLER:   BDI:   Treatment plan electronically signed by Treatment Team:   GABRIEL Castorena, LPCC-S, PABLO, PABLO Plasencia, GAB, GABRIEL Nicholson, Eduardo, GABRIEL Gaytan, APRN-CNP

## 2024-03-08 NOTE — PROGRESS NOTES
Patient checked in with IOP provider due to patient deciding to pause enrollment in [facility] for this week due to intense gastrointestinal issues. Patient shared for the last 2 months he has struggled with gastro issues and went to urgent care this week. He was offered medications to hep manage symptoms but scheduled a gastro appointments for Monday 3/11. Patient plans to return to group on Monday the 11th and will keep IOP providers updated on further appointments needed for physical health. Patient shared feeling his symptoms this week were 'stable' and 'steady'. Patient discussed wanting to take positive steps forward in his career journey and IOP provider encouraged him to focus on his physical and mental health at this time. Will continue to follow up while patient is enrolled in [facility].   GAB Reed

## 2024-03-11 ENCOUNTER — CLINICAL SUPPORT (OUTPATIENT)
Dept: BEHAVIORAL HEALTH | Facility: CLINIC | Age: 34
End: 2024-03-11
Payer: COMMERCIAL

## 2024-03-11 ENCOUNTER — OFFICE VISIT (OUTPATIENT)
Dept: GASTROENTEROLOGY | Facility: HOSPITAL | Age: 34
End: 2024-03-11
Payer: COMMERCIAL

## 2024-03-11 VITALS
SYSTOLIC BLOOD PRESSURE: 114 MMHG | OXYGEN SATURATION: 96 % | WEIGHT: 198 LBS | HEART RATE: 71 BPM | HEIGHT: 72 IN | BODY MASS INDEX: 26.82 KG/M2 | TEMPERATURE: 97.8 F | DIASTOLIC BLOOD PRESSURE: 73 MMHG

## 2024-03-11 DIAGNOSIS — K59.00 CONSTIPATION, UNSPECIFIED CONSTIPATION TYPE: ICD-10-CM

## 2024-03-11 DIAGNOSIS — R10.9 ABDOMINAL DISCOMFORT: ICD-10-CM

## 2024-03-11 DIAGNOSIS — R19.4 CHANGE IN BOWEL HABITS: Primary | ICD-10-CM

## 2024-03-11 DIAGNOSIS — R19.7 DIARRHEA, UNSPECIFIED TYPE: ICD-10-CM

## 2024-03-11 DIAGNOSIS — F25.0 SCHIZOAFFECTIVE DISORDER, BIPOLAR TYPE (MULTI): ICD-10-CM

## 2024-03-11 PROCEDURE — 99214 OFFICE O/P EST MOD 30 MIN: CPT | Performed by: NURSE PRACTITIONER

## 2024-03-11 PROCEDURE — 90853 GROUP PSYCHOTHERAPY: CPT | Mod: GT,HE,U2

## 2024-03-11 PROCEDURE — 99204 OFFICE O/P NEW MOD 45 MIN: CPT | Performed by: NURSE PRACTITIONER

## 2024-03-11 PROCEDURE — 3008F BODY MASS INDEX DOCD: CPT | Performed by: NURSE PRACTITIONER

## 2024-03-11 ASSESSMENT — PAIN SCALES - GENERAL: PAINLEVEL: 0-NO PAIN

## 2024-03-11 NOTE — PATIENT INSTRUCTIONS
Recommendations  Recommend adequate hydration and high fiber diet.  Obtain fecal calprotectin stool test.  Take capful Miralax every day for 14 days. Then, can continue at daily or can back down to every day if needed. If no improvement after 14 days please notify office.  Stop Zofran as this is likely contributing to your constipation.  Follow up in 4-6 weeks. Please call the office at [phone] with any questions or concerns.

## 2024-03-11 NOTE — PROGRESS NOTES
Hussain Villafana is a 33 y.o. male with past medical history of Schizoaffective/Bipolar disorder who presents today for diarrhea. He states he first developed diarrhea 12/28/2023. He initially had 6-10 loose stools per day with nausea and vomiting. Initially thought symptoms due to his eating around the holidays but symptoms persisted. He was vomiting about 4 times per day (but not every day). Of note, he had been on antibiotics but is unsure if this made his symptoms worse.    He eventually developed severe abdominal pain and presented to ER 2/7/2024. He had a normocytic anemia with Hgb 13. LFTs, TSH, and lipase normal. Covid test negative. He had CT scan showed minimal gastric and small bowel distention with air and fluid, concerning for gastroenteritis. He was told to follow up with GI. However his symptoms would vary in intensity and come and go so he initially did not follow up. He then was admitted to hospital for psychiatric issues. He was eating hospital food and thought this made symptoms worse.    He was then having lots of constipation. Presented to  Urgent Care and had x-ray 3/4/2024. This showed moderate amount of stool burden. Tried Miralax, but did not take this very consistently. It did not really help.    Today, states he continues to have constipation. A couple weeks ago was having small hard pebble-like stools daily. Then skipped 5 days with no bowel movement. He ended up taking 2 days of Miralax and finally had bowel movement Friday and then skipped 2 days again. He no longer has severe pain like he did when at ER, but states he has a generalized discomfort, poor appetite, and nausea. Vomiting still coming and going, occurred 6 times in the last 8 days. Now with chills. Weight stable. He has been taking Zofran for nausea as sometimes when he eats he feels like it comes right back up. He does note an increase in his Risperdal dose about 3 weeks ago. No prior EGD or colonoscopy. He has  history of incarcerated hernia s/p surgery age 29. He works out regularly and takes vitamin D, multivitamin, fish oil, coq10, and probiotic.    Social history: Currently working as a host at a restaurant.    Family history: Denies family history of colon cancer or other GI disorders or malignancy.     Past Medical History:   Diagnosis Date    Abdominal hernia 07/05/2022    Chronic prostatitis 07/12/2019    Continuous LLQ abdominal pain 07/07/2023    Dyspnea on exertion 07/07/2023    Epidermoid cyst of neck 07/05/2022    Generalized anxiety disorder 03/05/2018    Other chest pain 04/24/2020    Atypical chest pain    Personal history of leukemia 04/24/2020    History of lymphoid leukemia     Past Surgical History:   Procedure Laterality Date    OTHER SURGICAL HISTORY  07/16/2018    Central IV With Subcutaneous [place] Mediport Single Lumen    OTHER SURGICAL HISTORY  07/19/2021    Cyst excision    OTHER SURGICAL HISTORY  07/19/2021    Inguinal hernia repair    OTHER SURGICAL HISTORY  07/21/2021    Mass excision     Current Outpatient Medications   Medication Sig Dispense Refill    risperiDONE (RisperDAL) 3 mg tablet Take 1 tablet (3 mg) by mouth 2 times a day. 60 tablet 0    topiramate (Topamax) 200 mg tablet Take 1 tablet (200 mg) by mouth once daily.       No current facility-administered medications for this visit.     No Known Allergies    No family history on file.    Review of Systems  Review of Systems negative except as noted in HPI.    Objective     /73   Pulse 71   Temp 36.6 °C (97.8 °F)   Ht 1.829 m (6')   Wt 89.8 kg (198 lb)   SpO2 96%   BMI 26.85 kg/m²      Physical Exam  Constitutional:  No acute distress. Normal appearance. Not ill-appearing.  HENT:  Head normocephalic and atraumatic. Conjunctivae normal.  Cardiovascular:  Normal rate. Regular rhythm.  Pulmonary:  Pulmonary effort normal. No respiratory distress. Breath sounds clear.  Abdominal:  Abdomen is flat and soft. There is no  distension. Mild upper abdominal tenderness with deep palpation. No rebound or guarding.  Skin: Dry.  Neurological:  Alert and oriented.  Psychiatric:  Mood and affect normal.    Assessment/Plan     33 y.o. male with history of Schizoaffective/Bipolar disorder who presents today for initial clinic visit for 3 month history of diarrhea followed by constipation with abdominal discomfort, nausea, and vomiting. Recent labs reassuring. CT scan 2/7/2024 showed minimal gastric and small bowel distention with air and fluid, concerning for gastroenteritis. Since then he has tended towards constipation and had X-ray at urgent care showing moderate stool burden. We discussed treating his underlying constipation (could be worsened due to his recent antibiotics, increase in Risperdal dose, or recent Zofran). We will also obtain fecal calprotectin to rule out any underlying bowel inflammation.    Recommendations  Recommend adequate hydration and high fiber diet.  Obtain fecal calprotectin stool test. If this is elevated will recommend colonoscopy. This was briefly discussed today.  Take capful Miralax every day for 14 days. Then, can continue at daily or can back down to every day if needed. If no improvement after 14 days please notify office.  Stop Zofran as this is likely contributing to constipation.  Follow up in 4-6 weeks. Please call the office at [phone] with any questions or concerns.     Electronically signed by: Karen Burleson CNP on 3/11/2024 at 2:38 PM

## 2024-03-11 NOTE — GROUP NOTE
Group Topic: Goals   Group Date: 3/11/2024  Start Time:  9:00 AM  End Time: 10:00 AM  Facilitators: GAB Pascal; GAB Pham   [facility]    Number of Participants: 5   Group Focus: activities of daily living skills, check in, daily focus, and goals  Treatment Modality: Behavior Modification Therapy, Cognitive Behavioral Therapy, and Patient-Centered Therapy  Interventions utilized were exploration, problem solving, and support  Purpose: coping skills, insight or knowledge, self-care, and trigger / craving management    Name: Hussain Villafana YOB: 1990   MR: [id number]    This was a video IOP group on a HIPAA compliant platform. All patients were provided with informed consent.   Date: 3/11/2024  Time: 9-10a, Number of Patients: 5, User Name: [email or web address], Number of Staff: 2    Group topic(s): Group members started with welcoming a new group member and introducing themselves. The group then spent time reviewing weekend SMART goal successes and struggles. They then identified 1 or 2 weekly intentions before break time.     Kirby was present on camera and appeared attentive. He shared continuing to feel ill with nausea and vomiting. He has an appointment with a doctor later this afternoon. He shared spending more time watching TV and being less productive this weekend as a result. He was able to meet up with a friend at a coffee shop on Saturday and go to an [place] party for an hour with his acting class. He set a weekly intention to prioritize a positive mindset.    Primary Facilitator SADIQ Wolf, [facility]  Secondary Facilitator DALI Schmidt  Supervisor SADIQ Wolf, [facility]

## 2024-03-12 ENCOUNTER — CLINICAL SUPPORT (OUTPATIENT)
Dept: BEHAVIORAL HEALTH | Facility: CLINIC | Age: 34
End: 2024-03-12
Payer: COMMERCIAL

## 2024-03-12 DIAGNOSIS — F25.0 SCHIZOAFFECTIVE DISORDER, BIPOLAR TYPE (MULTI): Primary | ICD-10-CM

## 2024-03-12 PROCEDURE — 90853 GROUP PSYCHOTHERAPY: CPT | Mod: GT,HE,U2

## 2024-03-12 NOTE — GROUP NOTE
Group Topic: Personal Responsibility   Group Date: 3/11/2024  Start Time: 11:00 AM  End Time: 12:00 PM  Facilitators: GAB Pham; GAB Pascal   Department: [facility] of [facility] [place]        Name: Hussain Villafana YOB: 1990   MR: [id number]      This was a video IOP group on a HIPAA compliant platform. All patients were provided with informed consent.   Date: 03/11/2024, Time: 11a-12p, Number of Patients: 5, User [facility]: [facility], ATR, Number of Staff: 2  Group topic(s): Today's topic was Road to Recovery. The group started with a review of last week's topics (guilt/shame & mindfulness), followed by a general conversation around the topic of recovery. We then covered psychoeducation on the topic and focused on the CHIME acronym. Lastly, the group engaged in a pseudo-art therapy topic where they lucretia their own “road to recovery” and later shared. Kirby found it difficult to convey how he felt through artwork but mentioned that he feels like his story has been a series of peaks and valleys. He sees his road as a constant work on his mental and physical well-being, that will take a long time. Kirby did appreciate being able to reflect on his life, outside of the art experience.     Primary Facilitator: DALI Schmidt  Supervised by Mely Castorena, SADIQ, [facility]     Secondary Facilitator: Heena DE GUZMAN

## 2024-03-12 NOTE — GROUP NOTE
Group Topic: Personal Responsibility   Group Date: 3/11/2024  Start Time: 10:00 AM  End Time: 11:00 AM  Facilitators: GAB Pham; GAB Pascal   Department: [facility]        Name: Hussain Villafana YOB: 1990   MR: [id number]      This was a video IOP group on a HIPAA compliant platform. All patients were provided with informed consent.   Date: 03/11/2024, Time: 10-11a, Number of Patients: 5, User Name: GAB Guerin-S, ATR, Number of Staff: 2  Group topic(s): Today's topic was Road to Recovery. The group started with a review of last week's topics (guilt/shame & mindfulness), followed by a general conversation around the topic of recovery. We then covered psychoeducation on the topic and focused on the CHIME acronym. Lastly, the group engaged in a pseudo-art therapy topic where they lucretia their own “road to recovery” and later shared. Kirby shared that recently he has been struggling in “hope and optimism.” He explained being repeatedly let know, leading him to question his own capabilities. Many of these let-downs are surrounding jobs and finances.     Primary Facilitator: Preston Menjivar CT  Supervised by GAB Wolf-S, [facility]     Secondary Facilitator: Heena DE GUZMAN

## 2024-03-12 NOTE — GROUP NOTE
Group Topic: Feeling Awareness/Expression   Group Date: 3/12/2024  Start Time:  9:00 AM  End Time: 10:00 AM  Facilitators: GAB Pascal; Joan Nicholson PsyD   [facility]    Number of Participants: 6   Group Focus: clarity of thought, feeling awareness/expression, and self-awareness  Treatment Modality: Cognitive Behavioral Therapy and Patient-Centered Therapy  Interventions utilized were group exercise  Purpose: maladaptive thinking and insight or knowledge    Name: Hussain Villafana YOB: 1990   MR: [id number]    This session was conducted via HIPAA compliant video. Patient was provided with informed consent.  Date: 3/12/24  Time: 9:00 am to 10:00 am  Group Members: 6 Number of Staff: 2  [email or web address]    Group Topic: Group members started with a feelings check-in followed by a review of common negative core beliefs. Group members identified personal negative core beliefs then were asked to write down at least 3 facts/pieces of evidence that contradicts this negative belief.    Patient was present on camera. He shared feeling hopeful. He met with a doctor yesterday and they have a plan to resolve his physical illness within the next few weeks. Patient shared one of his negative core beliefs is that he is a loser. He shared this belief came about 2 years ago and it resurfaces when he compares himself to his brother and sisters' successes. He feels he has failed at meeting his own  personal career aspirations and worries that he is the 'problem child'. He was able to provide some evidence to the contrary and noted his success with having a social life and seeing his expectations of himself might need to be re-evaluated as well.     Primary Facilitator GAB Wolf-S, [facility]  Secondary Facilitator Preston Menjivar, CT  Supervisor SADIQ Fajardo, ATR

## 2024-03-13 ENCOUNTER — DOCUMENTATION (OUTPATIENT)
Dept: BEHAVIORAL HEALTH | Facility: CLINIC | Age: 34
End: 2024-03-13
Payer: COMMERCIAL

## 2024-03-13 ENCOUNTER — CLINICAL SUPPORT (OUTPATIENT)
Dept: BEHAVIORAL HEALTH | Facility: CLINIC | Age: 34
End: 2024-03-13
Payer: COMMERCIAL

## 2024-03-13 DIAGNOSIS — F25.0 SCHIZOAFFECTIVE DISORDER, BIPOLAR TYPE (MULTI): Primary | ICD-10-CM

## 2024-03-13 PROCEDURE — 90853 GROUP PSYCHOTHERAPY: CPT | Mod: GT,HE,U2

## 2024-03-13 NOTE — PROGRESS NOTES
I met with pt. upon request during the 11am group time.  Pt. reported feeling frustrated throughout group today, due to being the only man in group.  Pt. shared that he found it hard to share, be vulnerable, and felt heard in a setting of all women.  Pt. also shared that he is currently questioning his own gender identity.   I validated the pt.'s experience as a man and the stigmas that accompany this but encouraged him that there is still benefit and being vulnerable as man.  Pt. denied SI/HI  I will continue to follow-up while pt. is enrolled in [facility].    Preston Menjivar, CT  Supervised by Amy Juarez, [facility], ATR

## 2024-03-13 NOTE — GROUP NOTE
Group Topic: Coping Skills   Group Date: 3/13/2024  Start Time:  9:00 AM  End Time: 10:00 AM  Facilitators: GAB Pham; GAB Pascal   [facility]    This was a video IOP group on a HIPAA compliant platform. All patients were provided with informed consent.     Date: 3/13/2024, Time: 9-10am, Number of Patients: 6, User Name: [email or web address],  Number of Staff: 2  Group topic(s): scheduling. The group engaged in exploring the challenges around creating and maintaining a schedule. As a whole, conversation was had on the benefits of having a schedule and the benefits on our mental health. Individually, patient created their own block schedule for the next two days and will have an accountability check in a later group. Coping to help manage barriers for scheduling were explored.     Name: Hussain Villafana YOB: 1990   MR: [id number]      Kirby was on topic and attentive. Patient was quiet and more reserved but shared feeling scheduling is helpful for his mental health due to helping create routine. Patient was engaged appropriately.   GAB Reed    Secondary facilitator: Preston Menjivar, CT  CT supervised by Amy Juarez, GAB-S, ATR\

## 2024-03-14 ENCOUNTER — CLINICAL SUPPORT (OUTPATIENT)
Dept: BEHAVIORAL HEALTH | Facility: CLINIC | Age: 34
End: 2024-03-14
Payer: COMMERCIAL

## 2024-03-14 ENCOUNTER — DOCUMENTATION (OUTPATIENT)
Dept: BEHAVIORAL HEALTH | Facility: CLINIC | Age: 34
End: 2024-03-14

## 2024-03-14 ENCOUNTER — TELEMEDICINE (OUTPATIENT)
Dept: BEHAVIORAL HEALTH | Facility: CLINIC | Age: 34
End: 2024-03-14
Payer: COMMERCIAL

## 2024-03-14 DIAGNOSIS — Z79.899 MEDICATION MANAGEMENT: ICD-10-CM

## 2024-03-14 DIAGNOSIS — F25.0 SCHIZOAFFECTIVE DISORDER, BIPOLAR TYPE (MULTI): ICD-10-CM

## 2024-03-14 DIAGNOSIS — F43.10 PTSD (POST-TRAUMATIC STRESS DISORDER): ICD-10-CM

## 2024-03-14 DIAGNOSIS — F25.0 SCHIZOAFFECTIVE DISORDER, BIPOLAR TYPE (MULTI): Primary | ICD-10-CM

## 2024-03-14 PROCEDURE — 3008F BODY MASS INDEX DOCD: CPT | Performed by: NURSE PRACTITIONER

## 2024-03-14 PROCEDURE — 99214 OFFICE O/P EST MOD 30 MIN: CPT | Performed by: NURSE PRACTITIONER

## 2024-03-14 PROCEDURE — 90853 GROUP PSYCHOTHERAPY: CPT | Mod: GT,HE,U2

## 2024-03-14 ASSESSMENT — ENCOUNTER SYMPTOMS: CONSTITUTIONAL NEGATIVE: 1

## 2024-03-14 NOTE — GROUP NOTE
Group Topic: Coping Skills   Group Date: 3/14/2024  Start Time: 11:00 AM  End Time: 12:00 PM  Facilitators: GAB Pham; GAB Pascal   [facility]    This was a video IOP group on a HIPAA compliant platform. All patients were provided with informed consent.     Date: 3/14/2024, Time: [id number], Number of Patients: 6, User Name: [email or web address],  Number of Staff: 2  Group topic(s): defense mechanisms. The group continued exploring the concept of defense mechanisms including different types of defense mechanisms including  repression, intellectualization, acting out and dissociation. Examples and personal situations regarding using defense mechanisms in their day to day lives. The group then explored maladaptive and effective coping to help manage defense mechanisms. Group discussion followed.     Name: Hussain Villafana YOB: 1990   MR: [id number]      Kirby was on topic and present. Patient maintained engagement throughout group exercise. Patient shared engaging with defense mechanisms including projection, sublimation and dissociation. Patient identified maladaptive coping as using nicotine and unhealthy self-soothing and planning to use effective coping such as physical exercise and relaxation techniques.   GAB Reed    Secondary facilitator: DALI Schmidt  CT supervised by GAB Fajardo-S, ATR

## 2024-03-14 NOTE — GROUP NOTE
Group Topic: Goals   Group Date: 3/14/2024  Start Time:  9:00 AM  End Time: 10:00 AM  Facilitators: GAB Pham; GAB Pascal   Department: [facility] [place]        Name: Hussain Villafana YOB: 1990   MR: [id number]      This was a video IOP group on a HIPAA compliant platform. All patients were provided with informed consent.   Date: 03/14/2024, Time: 9-10a, Number of Patients: 6, User Name: SADIQ Guerin, ATR, Number of Staff: 2  Group topic(s): SMART goal setting group today. Group members identified achievement goals, enjoyment goals, and social connection goals - as well as anticipated obstacles and coping skills. Prior to writing and discussing goals, the group spent some time identifying current emotional states on a feelings wheel. Kirby identified feeling weak. He set goals to go to work, vacuum his apartment, connect with a friend (either text or call), call his brother, watch a movie, and read the paper. Kirby also mentioned not going to AA meetings and seeking out a group for codependent adult children for fellowship. He anticipates his health being his main obstacle this weekend. He plans to use walking and meditating as coping skills.     Primary Facilitator: DALI Schmidt  Supervised by Amy Rosas, MARIA DOLORESS, ATR     Secondary Facilitator: SADIQ Wolf, [facility]
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
## 2024-03-14 NOTE — PROGRESS NOTES
INTENSIVE OUTPATIENT PROGRAM MULTIDISCIPLINARY  TREATMENT PLAN & PROGRESS NOTE     Patient: Hussain Villafana : 1990 Age: 33     Student: No  Treatment Team Date: 3/11/24 MRN#: [id number]   Attending Physician: Dr. RICKY Red MD  Date of IOP Admission: 24 Ref by: Self   Week: 4                        Admission Scores:   DASS21: 15 MILLER: 15 BDI: 12 PHQ-9: 5 MAST: 20 DAST: 0 MDQ:  Positive      Current Risk Assessment:  Suicidal Risk:         None:   X     Ideation:       Plan:      Intent:      SI Plan with plan contracts for safety:     Hx of harming self:        Homicidal Risk:     None:   X     Ideation:       Plan:      Intent:      HI Plan with means:                                     Hx of harming others:          Global Improvement  Clinical Global Impressions Rating Scale Of Illness:  6  1-No Illness Present   2-Minimal   3-Mild   4-Moderate   5-Marked   6-Severe X    7-Very Severe    Diagnoses & ICD-10 Codes  Primary Diagnosis & Code: Schizoaffective Disorder, Bipolar Type; F25.0     Psychosocial & Environmental Stressors:   1. Recent psychiatric hospitalization   2. Lack of social support   3. Work/career difficulties     Patient's Treatment Goals:                 Date Initiated:           Progress Update:               1.  Attend and actively participate in IOP _4_ days/week for 3 hours per day  2024   Good X     Fair        Poor  Unclear     2.  Attend weekly medication management sessions and maintain compliance of medications  2024      Good X     Fair        Poor  Unclear                                                     3.  Identify symptoms of diagnoses and developing appropriate coping plans  2024   Good         Fair X    Poor  Unclear                                 4.  Decreasing irrational thoughts and reintegrating into daily life  2024  Good         Fair X    Poor  Unclear        Current medications:  See EMR chart       Progress note:  __New to the IOP  program, attending as planned  _X_Compliant, Progressing & Improving - Needs more time in IOP therapy program   __Compliant, Progressing & Improving Planning Discharge   __Compliant, Not Progressing or Improving: Reason  __Non-Compliant, not progressing or improving: Plan  __Other:     Insurance & Benefits: [place] Medicaid, IN NETWORK, BENEFITS ARE BASED ON MEDICAL NECESSITY ONLY: Unlimited visits, covers 100% of the contracted rate after deductible has been met.     Co-Pay per day: $0    DEDUCTIBLE        Single: / Family: / Accumulation:  Single: / Family: /  CO- INSURANCE  Single: / Family: / Accumulation:  Single: /  Family: /    OUT OF POCKET  Single: / Family: / Accumulation:  Single: /  Family: /     Total IOP Sessions completed & authorized to date:  11    Discharge plans have been discussed with patient & medication management provider on:   Reason for discharge:    ___Successfully completed IOP treatment:   ___Pt. decided to seek treatment elsewhere:   ___Dropped out or no show more than three times:  ___Benefits exhausted:   ___Other:    Discharge date:                     Discharge Prognosis: Excellent      Good     Fair     Poor    Follow up appointment with: Physician:   Follow up appointment with: Therapist:    Follow up Aftercare group?  Yes   Patient provided with Crisis Hotline phone number for suicide prevention? Yes     Discharge Scores: Not Completed    Emergency Treatment Plan Completed by patient: Yes__  No__          DASS21:   MILLER:   BDI:   Treatment plan electronically signed by Treatment Team:   GABRIEL Castorena, LPCC-S, [facility], PABLO Plasencia, GAB, GABRIEL Nicholson, PsMickey, GABRIEL Gaytan, APRN-CNP

## 2024-03-14 NOTE — GROUP NOTE
Group Topic: Coping Skills   Group Date: 3/14/2024  Start Time: 10:00 AM  End Time: 11:00 AM  Facilitators: GAB Pham; GAB Pascal   [facility]    This was a video IOP group on a HIPAA compliant platform. All patients were provided with informed consent.     Date: 3/14/2024, Time: 10-11a, Number of Patients: 6, User Name: [email or web address],  Number of Staff: 2  Group topic(s): defense mechanisms. The group explored the concept of defense mechanisms including when we may use them and the ways they may help us and hurt us. As a whole discussion was had on different types of defense mechanisms including denial, displacement, projection, rationalization, sublimation, and regression. Examples and personal situations regarding using defense mechanisms in their day to day lives.    Name: Hussain Villafana YOB: 1990   MR: [id number]      Kirby was on topic and attentive. Patient followed along appropriately and shared examples of the defense mechanisms such as sublimation and projection. Patient engaged appropriately.   GAB Reed    Secondary facilitator: Preston Menjivar, CT  CT supervised by Amy Juarez, GAB-S, ATR

## 2024-03-14 NOTE — PROGRESS NOTES
Met with patient to discuss progress in IOP. Patient shared feeling his symptoms are more stable and he attributes this to both therapy and the medications. Provider offered some insight into possibility that medications may be less effective due to ongoing GI issues. Patient shared applying to a job within his building as a . Patient shared wanting to go this route as he is feeling his parents are withdrawing their financial support. Provider challenged this thought and patinet shared conversation where he felt parents were 'emotionally exhausted' by his future plans. Provider encouraged patient to explore current, more short-term career plan with his parents as their support may look different than when his mindset was surrounded around more long term, extensive plans. Patient presented with slow speech and delayed responses. Patient plans to continue following up with [facility] provider tomorrow, 3/15/24. Will continue to follow up with patient while he is enrolled in [facility].   Heena Plasencia [facility] Center[facility]AMES

## 2024-03-14 NOTE — PROGRESS NOTES
Met with patient to discuss progress in IOP. Patient shared feeling his symptoms are more stable and he attributes this to both therapy and the medications. Provider offered some insight into possibility that medications may be less effective due to ongoing GI issues. Patient shared applying to a job within his building as a . Patient shared wanting to go this route as he is feeling his parents are withdrawing their financial support. Provider challenged this thought and patinet shared conversation where he felt parents were 'emotionally exhausted' by his future plans. Provider encouraged patient to explore current, more short-term career plan with his parents as their support may look different than when his mindset was surrounded around more long term, extensive plans. Patient presented with slow speech and delayed responses. Patient plans to continue following up with [facility] provider tomorrow, 3/15/24. Will continue to follow up with patient while he is enrolled in [facility].   Heena Plasencia [facility] Center[facility]AMES

## 2024-03-15 NOTE — GROUP NOTE
Group Topic: Coping Skills   Group Date: 3/12/2024  Start Time: 10:00 AM  End Time: 11:00 AM  Facilitators: Joan Nicholson PsyD; Mely Castorena [facility]   Department:  [facility]    Number of Participants: 6   Group Focus: coping skills and psychiatric education  Treatment Modality: Psychoeducation and Skills Training  Interventions utilized were patient education  Purpose: coping skills and other: Sleep Hygiene    This was a video IOP group on a HIPAA compliant platform. All patients were provided with informed consent.    Group topic: Sleep Hygiene  Group members received psychoeducation on the stages of sleep and why sleep is important. They were taught various sleep hygiene techniques and how to use these to create better sleep habits.   Jacky Nice IV, M.D.  Joan Nicholson Psy.D.  Secondary facilitator: DALI Schmidt  Supervisor Amy Juarez, [facility]-S, ATR    Name: Hussain Villafana YOB: 1990   MR: [id number]      Hussain was present and actively engaged in discussion. He stated that he has difficulty trusting the [facility] and noted that he needs more sleep per night than the average person in order to maintain stable mood.

## 2024-03-15 NOTE — GROUP NOTE
Group Topic: Feeling Awareness/Expression   Group Date: 3/13/2024  Start Time: 11:00 AM  End Time: 12:00 PM  Facilitators: GAB Pascal; GAB Pham   Department: [facility]    Number of Participants: 6   Group Focus: clarity of thought, coping skills, feeling awareness/expression, and self-awareness  Treatment Modality: Cognitive Behavioral Therapy, [place] Therapy, and Patient-Centered Therapy  Interventions utilized were exploration, group exercise, and support  Purpose: feelings, communication skills, and self-worth    Name: Hussain Villafana YOB: 1990   MR: [id number]    This was a video IOP group on a HIPAA compliant platform. All patients were provided with informed consent.     Date:3/13/2024, Time:11:00a-12:00pm, Number of Patients: 6, User Name: [email or web address], Number of Staff: 2    Group Topic: Mental health diagnosis processing, separation from self and self-esteem building.    Group members continued with the exercise by writing a second letter to themselves. They were given the prompt to write to themselves in a kind, gentle way and to focus on their strengths, resilience and capabilities. Group members then processed their letters and supported one another.     Kirby was present on camera. He was observed sitting but not writing during the second letter prompt. He requested to meet with intern Preston Menjivar and did so for a period of time during this hour.     Provider Signature: SADIQ Wolf LICDC  Secondary facilitator(s): DALI Schmidt  Supervisor SADIQ Mackey LICDC

## 2024-03-15 NOTE — GROUP NOTE
Group Topic: Feeling Awareness/Expression   Group Date: 3/13/2024  Start Time: 10:00 AM  End Time: 11:00 AM  Facilitators: GAB Pascal; GAB Pham   [facility]    Number of Participants: 6   Group Focus: clarity of thought, communication, coping skills, and feeling awareness/expression  Treatment Modality: Cognitive Behavioral Therapy, [place] Therapy, and Patient-Centered Therapy  Interventions utilized were exploration, group exercise, story telling, and support  Purpose: feelings, communication skills, and self-worth    Name: Hussain Villafana YOB: 1990   MR: [id number]    This was a video IOP group on a HIPAA compliant platform. All patients were provided with informed consent.     Date: 3/13/2024, Time:10:00a-11:00a, Number of Patients: 6, User Name: [email or web address],  Number of Staff: 2  Group topic(s):  Expression of thoughts and feelings about mental health conditions and symptoms.   Group members took time to share how they feel about having mental health issues. Group members were prompted to write a letter to their mental health condition or symptoms of choice. Group members then took time to read the letters and process content.     Kirby was present on camera. He was attentive as others expressed their feelings and read their letters. He did not appear to write a letter as prompted and he did not contribute to the discussion.      Provider Signature: SADIQ Wolf, PABLO  Secondary facilitator(s): DALI Schmidt  Supervisor SADIQ Wolf LICDC

## 2024-03-18 ENCOUNTER — LAB (OUTPATIENT)
Dept: LAB | Facility: LAB | Age: 34
End: 2024-03-18
Payer: COMMERCIAL

## 2024-03-18 ENCOUNTER — CLINICAL SUPPORT (OUTPATIENT)
Dept: BEHAVIORAL HEALTH | Facility: CLINIC | Age: 34
End: 2024-03-18
Payer: COMMERCIAL

## 2024-03-18 DIAGNOSIS — K59.00 CONSTIPATION, UNSPECIFIED CONSTIPATION TYPE: ICD-10-CM

## 2024-03-18 DIAGNOSIS — R10.9 ABDOMINAL DISCOMFORT: ICD-10-CM

## 2024-03-18 DIAGNOSIS — R19.4 CHANGE IN BOWEL HABITS: ICD-10-CM

## 2024-03-18 DIAGNOSIS — F25.0 SCHIZOAFFECTIVE DISORDER, BIPOLAR TYPE (MULTI): ICD-10-CM

## 2024-03-18 PROCEDURE — 83993 ASSAY FOR CALPROTECTIN FECAL: CPT

## 2024-03-18 PROCEDURE — 90853 GROUP PSYCHOTHERAPY: CPT | Mod: GT,HE,U2

## 2024-03-18 NOTE — GROUP NOTE
Group Topic: Goals   Group Date: 3/18/2024  Start Time:  9:00 AM  End Time: 10:00 AM  Facilitators: GAB Pascal; GAB Pham   [facility]    Number of Participants: 7   Group Focus: check in, coping skills, daily focus, and goals  Treatment Modality: Behavior Modification Therapy, Cognitive Behavioral Therapy, and Patient-Centered Therapy  Interventions utilized were exploration, group exercise, problem solving, and support  Purpose: coping skills, insight or knowledge, self-care, and relapse prevention strategies    Name: Hussain Villafana YOB: 1990   MR: [id number]      This was a video IOP group on a HIPAA compliant platform. All patients were provided with informed consent.   Date: 3/18/2024  Time: 9-10a, Number of Patients: 7, User Name: [email or web address], Number of Staff: 2    Group topic(s): Group members started by welcoming new group members and introducing themselves. The group then spent time reviewing weekend SMART goal successes and struggles.     Kirby was present on camera and appeared attentive. He shared physically he is about 50% better and finding some relief. He shared he had some nausea but has not vomited since last Wednesday. He shared he was able to go to work, vacuum, clean and connect with a friend and his brother. He also watched a movie and read the paper for enjoyment. 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    Primary Facilitator SADIQ Wolf, [facility]  Secondary Facilitator Preston Menjivar, CT  Supervisor MARIA DOLORES WolfS, [facility]

## 2024-03-19 ENCOUNTER — CLINICAL SUPPORT (OUTPATIENT)
Dept: BEHAVIORAL HEALTH | Facility: CLINIC | Age: 34
End: 2024-03-19
Payer: COMMERCIAL

## 2024-03-19 DIAGNOSIS — F25.0 SCHIZOAFFECTIVE DISORDER, BIPOLAR TYPE (MULTI): ICD-10-CM

## 2024-03-19 PROCEDURE — 90853 GROUP PSYCHOTHERAPY: CPT | Mod: GT,HE,U2

## 2024-03-19 NOTE — GROUP NOTE
Group Topic: Safety   Group Date: 3/19/2024  Start Time:  9:00 AM  End Time: 10:00 AM  Facilitators: GAB Pascal; Joan Nicholson PsyD   Department: [facility]    Number of Participants: 7   Group Focus: safety plan  Treatment Modality: Cognitive Behavioral Therapy, Patient-Centered Therapy, and Solution-Focused Therapy  Interventions utilized were exploration, group exercise, and support  Purpose: coping skills, communication skills, and relapse prevention strategies    Name: Hussain Villafana YOB: 1990   MR: [id number]    This session was conducted via HIPAA compliant video. Patient was provided with informed consent.    Date:3/19/24  Time: 9:00 am to 10:00 am  Group Members: 7  Number of Staff:2  [email or web address]    Group Topic: Safety Planning    Group members focused on filling out a safety plan worksheet. Members identified early warning signs of distress, top stressors/situations, activities to help them cope, people to call to ask for help and safe environments they can go. The group was also given 24/7 crisis hotlines and encouraged to program these directly into their phones. Lastly, group members were encouraged to continue filling out the worksheet on their own and review the helpful grounding techniques on the second page.     Kirby was present on camera. He was observed continuing to write during this hour. He identified 10-20 meditations and breath work as a helpful coping technique he uses often.     Primary Facilitator - SADIQ Wolf, PABLO  Secondary Facilitator - DALI Schmidt  Supervisor SADIQ Wolf, PABLO

## 2024-03-19 NOTE — GROUP NOTE
"Group Topic: Feeling Awareness/Expression   Group Date: 3/19/2024  Start Time: 10:00 AM  End Time: 11:00 AM  Facilitators: Joan Nicholson PsyD; Mely Castorena [facility]   Department:  [facility]    Number of Participants: 7   Group Focus: other Vulnerability   Treatment Modality: Psychoeducation  Interventions utilized were exploration and patient education  Purpose: feelings and insight or knowledge    This was a video IOP group on a HIPAA compliant platform. All patients were provided with informed consent.    Group topic: Vulnerability   Group members reviewed topics from last week related to sleep hygiene and trust. They watched Salvador Talk \"The Power of Vulnerability\" from Giselle Dwyer. They began discussion about reactions to this video.   Joan Nicholson Psy.D.  Secondary facilitator: DALI Schmidt  Supervisor Amy Juarez, [facility]-S, ATR      Name: Hussain Villafana YOB: 1990   MR: [id number]      Kirby was present and actively engaged in discussion. He stated that he is working on eliminating caffeine for sleep improvement. He shared that being vulnerable means sticking to core values and acting on them.         "

## 2024-03-19 NOTE — GROUP NOTE
Group Topic: Feeling Awareness/Expression   Group Date: 3/19/2024  Start Time: 11:00 AM  End Time: 12:00 PM  Facilitators: Joan Nicholson PsyD; [facility]   Department:  [facility]    Number of Participants: 7   Group Focus: other Vulnerability   Treatment Modality: Psychoeducation  Interventions utilized were exploration and patient education  Purpose: feelings and insight or knowledge    This was a video IOP group on a HIPAA compliant platform. All patients were provided with informed consent.    Group topic: Vulnerability   Group members identified strategies that they feel can help them lean into vulnerability and barriers they face with being vulnerable. They shared examples of opportunities for vulnerability and what it looks like to be vulnerable   Joan Nicholson Psy.D.  Secondary facilitator: DALI Schmidt  Supervisor Amy Juarez, [facility]-S, ATR      Name: Hussain Villafana YOB: 1990   MR: [id number]      Kirby was present and actively engaged in discussion. 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

## 2024-03-19 NOTE — GROUP NOTE
Group Topic: Feeling Awareness/Expression   Group Date: 3/18/2024  Start Time: 10:00 AM  End Time: 11:00 AM  Facilitators: Heena Plasencia [facility]; GAB Pascal   Department: [facility]        Name: Hussain Villafana YOB: 1990   MR: [id number]      This was a video IOP group on a HIPAA compliant platform. All patients were provided with informed consent.   Date: 03/18/2024, Time: 10-11a, Number of Patients: 7, User Name: Truong [facility]-S, ATR, Number of Staff: 2  Group topic(s): Today's topic was Breaking the Cycle. The group first engaged in a discussion around three different forms of stigma (structural, social, and self). This was followed by three conversations around how group members' caretakers viewed mental health, the impact of these views, and group members' personal views on mental health. Lastly, the group engaged in an activity where they created a timeline based on our three conversations, which was later shared. Kirby shared that he feels stigmatized for taking medication and might be referred to as “off his meds” if he asks strange. He also shared that his dad tends to be more accepting, while his mom tends to say, “just get through it.”     Primary Facilitator: DALI Schmidt  Supervised by Mely Castorena, GAB-S, [facility]     Secondary Facilitator: Heena Plasencia [facility]

## 2024-03-19 NOTE — GROUP NOTE
Group Topic: Feeling Awareness/Expression   Group Date: 3/18/2024  Start Time: 11:00 AM  End Time: 12:00 PM  Facilitators: GAB Pham; GAB Pascal   Department: [facility]        Name: Hussain Villafana YOB: 1990   MR: [id number]      This was a video IOP group on a HIPAA compliant platform. All patients were provided with informed consent.   Date: 03/18/2024, Time: 11a-12p, Number of Patients: 7, User Name: GBA Guerin-S, ATR, Number of Staff: 2  Group topic(s): Today's topic was Breaking the Cycle. The group first engaged in a discussion around three different forms of stigma (structural, social, and self). This was followed by three conversations around how group members' caretakers viewed mental health, the impact of these views, and group members' personal views on mental health. Lastly, the group engaged in an activity where they created a timeline based on our three conversations, which was later shared. Kirby shared he believes mental health struggles are part of the human condition. He thinks diagnosis may help, but that all struggles are on a spectrum of severity, and everyone encounters them.    Primary Facilitator: DALI Schmidt  Supervised by SADIQ Wolf, [facility]     Secondary Facilitator: Heena DE GUZMAN

## 2024-03-20 ENCOUNTER — CLINICAL SUPPORT (OUTPATIENT)
Dept: BEHAVIORAL HEALTH | Facility: CLINIC | Age: 34
End: 2024-03-20
Payer: COMMERCIAL

## 2024-03-20 DIAGNOSIS — F25.0 SCHIZOAFFECTIVE DISORDER, BIPOLAR TYPE (MULTI): ICD-10-CM

## 2024-03-20 LAB — CALPROTECTIN STL-MCNT: <5 UG/G

## 2024-03-20 PROCEDURE — 90853 GROUP PSYCHOTHERAPY: CPT | Mod: GT,HE,U2

## 2024-03-20 NOTE — GROUP NOTE
Group Topic: Coping Skills   Group Date: 3/20/2024  Start Time: 10:00 AM  End Time: 11:00 AM  Facilitators: GAB Pascal; GBA Pham   Department: [facility]    Number of Participants: 8   Group Focus: chemical dependency education  Treatment Modality: Psychoeducation  Interventions utilized were patient education  Purpose: coping skills, insight or knowledge, relapse prevention strategies, and trigger / craving management    Name: Hussain Villafana YOB: 1990   MR: [id number]    This session was conducted via HIPAA compliant video. Patient was provided with informed consent.  Date: 3/20/24  Time: 10:00 am to 11:00 am  Group Members: 8  Number of Staff: 2  [email or web address]  Group Topic: Psychoeducation of substance use, abuse and dependence. Group members were educated on the differences between substance use, abuse and dependence and the disease concept of addiction. Group members also reviewed maladaptive coping strategies and the importance of recognizing ones own unhealthy coping mechanisms and learn healthier ways of dealing with life issues.    Kirby was on camera and appeared attentive during the educational portion.  He shared continuing to struggle with nicotine use on and off. He shared understanding the power of addiction from personal experience.     Primary Facilitator GAB Wolf-S, [facility]  Secondary facilitator DALI Schmidt  Supervisor GAB Fajardo-S, ATR
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
## 2024-03-21 ENCOUNTER — CLINICAL SUPPORT (OUTPATIENT)
Dept: BEHAVIORAL HEALTH | Facility: CLINIC | Age: 34
End: 2024-03-21
Payer: COMMERCIAL

## 2024-03-21 ENCOUNTER — TELEMEDICINE (OUTPATIENT)
Dept: BEHAVIORAL HEALTH | Facility: CLINIC | Age: 34
End: 2024-03-21
Payer: COMMERCIAL

## 2024-03-21 DIAGNOSIS — F25.0 SCHIZOAFFECTIVE DISORDER, BIPOLAR TYPE (MULTI): Primary | ICD-10-CM

## 2024-03-21 DIAGNOSIS — F25.0 SCHIZOAFFECTIVE DISORDER, BIPOLAR TYPE (MULTI): ICD-10-CM

## 2024-03-21 DIAGNOSIS — F43.10 PTSD (POST-TRAUMATIC STRESS DISORDER): ICD-10-CM

## 2024-03-21 DIAGNOSIS — Z79.899 MEDICATION MANAGEMENT: ICD-10-CM

## 2024-03-21 PROCEDURE — 90853 GROUP PSYCHOTHERAPY: CPT | Mod: GT,HE,U2

## 2024-03-21 PROCEDURE — 99214 OFFICE O/P EST MOD 30 MIN: CPT | Performed by: NURSE PRACTITIONER

## 2024-03-21 PROCEDURE — 3008F BODY MASS INDEX DOCD: CPT | Performed by: NURSE PRACTITIONER

## 2024-03-21 ASSESSMENT — ENCOUNTER SYMPTOMS: CONSTITUTIONAL NEGATIVE: 1

## 2024-03-21 NOTE — GROUP NOTE
Group Topic: Coping Skills   Group Date: 3/21/2024  Start Time: 10:00 AM  End Time: 11:00 AM  Facilitators: GAB Pham; GAB Pascal   [facility]    This was a video IOP group on a HIPAA compliant program. All patients were provided with informed consent.     Date: 3/21/24, Time: 10-11a, Number of Patients: 7, Username: [email or web address], Number of Staff: 2  Group Topic(s): coping styles. The group engaged in defining coping skills and patients brainstormed coping skills falling into five categories including self-soothing, distractions, opposite action, emotional awareness, and mindfulness. Examples of self-soothing and distractions were explored. Group discussion followed.     Name: Hussain Villafana YOB: 1990   MR: [id number]      Kirby was attentive and on topic. Patient followed along and maintained nonverbal engagement.   GAB Reed    Secondary facilitator: DALI Schmidt  CT supervised by GAB Fajardo-S, ATR
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
## 2024-03-21 NOTE — GROUP NOTE
Group Topic: Coping Skills   Group Date: 3/21/2024  Start Time: 11:00 AM  End Time: 12:00 PM  Facilitators: GAB Pham; GAB Pascal   [facility]    This was a video IOP group on a HIPAA compliant program. All patients were provided with informed consent.     Date: 3/21/24, Time: 11a-12p, Number of Patients: 6, Username: [email or web address], Number of Staff: 2  Group Topic(s): coping styles. The group continued brainstorming coping skills falling into five categories including  opposite action, emotional awareness, and mindfulness. Group discussion followed.     Name: Hussain Villafana YOB: 1990   MR: [id number]      Kirby was on topic and attentive. Patient shared examples of coping including meditation as a mindfulness skill.   GAB Reed    Secondary facilitator: Pretson Menjivar, CT  CT supervised by GAB Fajardo-S, ATR

## 2024-03-21 NOTE — GROUP NOTE
Group Topic: Goals   Group Date: 3/21/2024  Start Time:  9:00 AM  End Time: 10:00 AM  Facilitators: GAB Pascal; GAB Pham   Department: [facility]    Number of Participants: 7   Group Focus: activities of daily living skills, daily focus, goals, and problem solving  Treatment Modality: Behavior Modification Therapy, Cognitive Behavioral Therapy, and Patient-Centered Therapy  Interventions utilized were assignment, exploration, group exercise, leisure development, and problem solving  Purpose: coping skills, insight or knowledge, self-care, and relapse prevention strategies    Name: Hussain Villafana YOB: 1990   MR: [id number]    This was a video IOP group on a HIPAA compliant platform. All patients were provided with informed consent.   Date: 03/21/2024, Time: 9-10a, Number of Patients: 7, User Name: SADIQ Guerin, VANGIE, Number of Staff: 2  Group topic(s): SMART goal setting group today. Group members identified achievement goals, enjoyment goals, and social connection goals - as well as anticipated obstacles and coping skills. Prior to writing and discussing goals, the group spent some time identifying current emotional states on a feelings wheel. Kirby identified feeling content. He set goals to workout, go to work, text a friend, follow-up with his AA sponsor, read the paper, and watch a movie. He identified obstacles of sickness and motivation. His coping skills include working out and meditation.     Primary Facilitator: DALI Schmidt  Supervised by SADIQ Ernst, ATR     Secondary Facilitator: SADIQ Wolf, [facility]

## 2024-03-21 NOTE — GROUP NOTE
Group Topic: Coping Skills   Group Date: 3/20/2024  Start Time:  9:00 AM  End Time: 10:00 AM  Facilitators: GAB Pham; GAB Pascal   [facility]    This was a video IOP group on a HIPAA compliant program. All patients were provided with informed consent.     Date: 3/21/2024, Time: 9-10a, Number of Patients: 8, Username: [email or web address], Number of Staff: 2  Group Topic(s): intro to coping. The group engaged in defining coping skills and exploring the use, benefits, and limitations to coping. Additionally, the group discussed the definition of coping skills and identified the difference between healthy coping and maladaptive coping as well as emotion focused & problem focused coping. Group discussion followed.     Name: Hussain Villafana YOB: 1990   MR: [id number]      Kirby was on topic and attentive. Patient followed along and maintained nonverbal engagement throughout.   GAB Reed    Secondary facilitator: DALI Schmidt  CT supervised by GAB Fajardo-S, ATR

## 2024-03-22 ENCOUNTER — DOCUMENTATION (OUTPATIENT)
Dept: BEHAVIORAL HEALTH | Facility: CLINIC | Age: 34
End: 2024-03-22
Payer: COMMERCIAL

## 2024-03-22 NOTE — PROGRESS NOTES
INTENSIVE OUTPATIENT PROGRAM MULTIDISCIPLINARY  TREATMENT PLAN & PROGRESS NOTE     Patient: Hussain Villafana : 1990 Age: 33     Student: No  Treatment Team Date: 3/28/24 MRN#: [id number]   Attending Physician: Dr. RICKY Red MD  Date of IOP Admission: 24 Ref by: Self   Week: 5                        Admission Scores:   DASS21: 15 MILLER: 15 BDI: 12 PHQ-9: 5 MAST: 20 DAST: 0 MDQ:  Positive      Current Risk Assessment:  Suicidal Risk:         None:   X     Ideation:       Plan:      Intent:      SI Plan with plan contracts for safety:     Hx of harming self:        Homicidal Risk:     None:   X     Ideation:       Plan:      Intent:      HI Plan with means:                                     Hx of harming others:          Global Improvement  Clinical Global Impressions Rating Scale Of Illness:  6  1-No Illness Present   2-Minimal   3-Mild   4-Moderate   5-Marked   6-Severe X    7-Very Severe    Diagnoses & ICD-10 Codes  Primary Diagnosis & Code: Schizoaffective Disorder, Bipolar Type; F25.0     Psychosocial & Environmental Stressors:   1. Recent psychiatric hospitalization   2. Lack of social support   3. Work/career difficulties     Patient's Treatment Goals:                 Date Initiated:           Progress Update:               1.  Attend and actively participate in IOP _4_ days/week for 3 hours per day  2024   Good X     Fair        Poor  Unclear     2.  Attend weekly medication management sessions and maintain compliance of medications  2024      Good X     Fair        Poor  Unclear                                                     3.  Identify symptoms of diagnoses and developing appropriate coping plans  2024   Good         Fair X    Poor  Unclear                                 4.  Decreasing irrational thoughts and reintegrating into daily life  2024  Good         Fair X    Poor  Unclear        Current medications:  See EMR chart       Progress note:  __New to the IOP  program, attending as planned  _X_Compliant, Progressing & Improving - Needs more time in IOP therapy program   __Compliant, Progressing & Improving Planning Discharge   __Compliant, Not Progressing or Improving: Reason  __Non-Compliant, not progressing or improving: Plan  __Other:     Insurance & Benefits: [place] Medicaid, IN NETWORK, BENEFITS ARE BASED ON MEDICAL NECESSITY ONLY: Unlimited visits, covers 100% of the contracted rate after deductible has been met.     Co-Pay per day: $0    DEDUCTIBLE        Single: / Family: / Accumulation:  Single: / Family: /  CO- INSURANCE  Single: / Family: / Accumulation:  Single: /  Family: /    OUT OF POCKET  Single: / Family: / Accumulation:  Single: /  Family: /     Total IOP Sessions completed & authorized to date:  15    Discharge plans have been discussed with patient & medication management provider on:   Reason for discharge:    ___Successfully completed IOP treatment:   ___Pt. decided to seek treatment elsewhere:   ___Dropped out or no show more than three times:  ___Benefits exhausted:   ___Other:    Discharge date:                     Discharge Prognosis: Excellent      Good     Fair     Poor    Follow up appointment with: Physician:   Follow up appointment with: Therapist:    Follow up Aftercare group?  Yes   Patient provided with Crisis Hotline phone number for suicide prevention? Yes     Discharge Scores: Not Completed    Emergency Treatment Plan Completed by patient: Yes__  No__          DASS21:   MILLER:   BDI:   Treatment plan electronically signed by Treatment Team:   GABRIEL Castorena, LPCC-S, [facility], PABLO Plasencia, GAB, GABRIEL Nicholson, PsMickey, GABRIEL Gaytan, APRN-CNP

## 2024-03-25 ENCOUNTER — CLINICAL SUPPORT (OUTPATIENT)
Dept: BEHAVIORAL HEALTH | Facility: CLINIC | Age: 34
End: 2024-03-25
Payer: COMMERCIAL

## 2024-03-25 DIAGNOSIS — F25.0 SCHIZOAFFECTIVE DISORDER, BIPOLAR TYPE (MULTI): ICD-10-CM

## 2024-03-25 PROCEDURE — 90853 GROUP PSYCHOTHERAPY: CPT | Mod: GT,HE,U2

## 2024-03-25 NOTE — GROUP NOTE
Group Topic: Goals   Group Date: 3/25/2024  Start Time:  9:00 AM  End Time: 10:00 AM  Facilitators: GAB Pascal; GAB Pham   Department: [facility]    Number of Participants: 11   Group Focus: activities of daily living skills, check in, daily focus, and goals  Treatment Modality: Behavior Modification Therapy, Cognitive Behavioral Therapy, and Patient-Centered Therapy  Interventions utilized were exploration, group exercise, and problem solving  Purpose: coping skills, insight or knowledge, self-care, and relapse prevention strategies    Name: Hussain Villafana YOB: 1990   MR: [id number]    This was a video IOP group on a HIPAA compliant platform. All patients were provided with informed consent.   Date: 3/25/2024  Time: 9-10a, Number of Patients: 11, User Name: [email or web address], Number of Staff: 2    Group topic(s): Group members started by welcoming new group members and introducing themselves. The group then spent time reviewing weekend SMART goal successes and struggles.     Kirby was present on camera and appeared attentive. He shared physically he is about 80-90% physically better. He shared not going to the gym as planned this weekend. 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 He changed his mind about asking a friend about personal training as a career path. He reached out to his previous sponsor and they plan to talk again next week. He also read the newspaper and watched basketball.     Primary Facilitator SADIQ Wolf, [facility]  Secondary Facilitator DALI Schmidt  Supervisor SADIQ Wolf, [facility]

## 2024-03-26 ENCOUNTER — CLINICAL SUPPORT (OUTPATIENT)
Dept: BEHAVIORAL HEALTH | Facility: CLINIC | Age: 34
End: 2024-03-26
Payer: COMMERCIAL

## 2024-03-26 ENCOUNTER — DOCUMENTATION (OUTPATIENT)
Dept: BEHAVIORAL HEALTH | Facility: CLINIC | Age: 34
End: 2024-03-26
Payer: COMMERCIAL

## 2024-03-26 DIAGNOSIS — F25.0 SCHIZOAFFECTIVE DISORDER, BIPOLAR TYPE (MULTI): Primary | ICD-10-CM

## 2024-03-26 PROCEDURE — 90853 GROUP PSYCHOTHERAPY: CPT | Mod: GT,HE,U2

## 2024-03-26 NOTE — PROGRESS NOTES
Met with patient to explore ongoing care in IOP and to explore next steps for treatment. Patient shared feeling his symptoms have decreased and endorsed feeling he has been able to integrate more realistic job prospects. Patient shared wanting to create some structure before discharge from [facility] as group has offered him. Patient plans to discharge Thursday 4/4/24. Patient has set up follow up are with  medication management and with a new therapist, Jaron. Will continue to follow up with patient while he's in IOP.   Heena Plasencia [facility]

## 2024-03-26 NOTE — GROUP NOTE
Group Topic: Coping Skills   Group Date: 3/25/2024  Start Time: 10:00 AM  End Time: 11:00 AM  Facilitators: Heena Plasencia [facility]; [facility]orena [facility]   [facility]        Name: Hussain Villafana YOB: 1990   MR: [id number]      This was a video IOP group on a HIPAA compliant platform. All patients were provided with informed consent.   Date: 03/25/2024, Time: 10-11a, Number of Patients: 11, User Name: Truong [facility]-S, ATR, Number of Staff: 2  Group topic(s): Today's topic was Self-Care. The group first engaged in a conversation around the definition of self-care, current self-care practices, and struggles in completing self-care. Next, we reviewed and discussed the 8 dimensions of self-care. Finally, the group completed a worksheet that identified what they do/would like to do in the various areas of self-care, followed by a brief action plan worksheet. Kirby did not participate during the 10am but was attentive and engaged.    Primary Facilitator: DALI Schmidt  Supervised by Amy Rosas, [facility], ATR     Secondary Facilitator: Heena Plasencia [facility]

## 2024-03-26 NOTE — GROUP NOTE
Group Topic: Coping Skills   Group Date: 3/25/2024  Start Time: 11:00 AM  End Time: 12:00 PM  Facilitators: Heena Plasencia [facility]; [facility]   [facility]        Name: Hussain Villafana YOB: 1990   MR: [id number]      This was a video IOP group on a HIPAA compliant platform. All patients were provided with informed consent.   Date: 03/25/2024, Time: 11a-12p, Number of Patients: 11, User Name: [email or web address] [facility], ATR, Number of Staff: 2  Group topic(s): Today's topic was Self-Care. The group first engaged in a conversation around the definition of self-care, current self-care practices, and struggles in completing self-care. Next, we reviewed and discussed the 8 dimensions of self-care. Finally, the group completed a worksheet that identified what they do/would like to do in the various areas of self-care, followed by a brief action plan worksheet. Kirby did not participate during the 11am but was attentive and engaged.    Primary Facilitator: DALI Schmidt  Supervised by Amy Roass, [facility], ATR     Secondary Facilitator: Heena Plasencia [facility]

## 2024-03-26 NOTE — PROGRESS NOTES
3/22/24  11:30 am Zoom Check-In    Kirby and I met for a scheduled check-in. He is continuing to have random bouts of nausea and reported he vomited again this past Wednesday and Thursday. If this continues he plans to contact his PCP Monday.   He reports he is still taking the laxative as prescribed.   He shared he has a phone call with his mother earlier in the week and he left it feeling encouraged. She sounded more at ease when Kirby shared he was seriously looking for a low stress job and that he had been open with us about his delusional thoughts.   He plans to meet with a new individual therapist in a few weeks. I informed him that he could sign a YOLANDA so that we could provide clinical information to this provider to assist in his therapy transition. I encouraged him to be open and honest about the delusional thoughts so he can continue to assess whether he is grounded in reality or if the delusions are clouding his judgement. Kirby shared it is uncomfortable for him to talk about these symptoms but seemed to understand the importance of being honest with his providers about these symptoms. He plans to think about signing the YOLANDA.   He shared he did not sleep well the night before and plans to go to work and relax by watching basketball over the weekend. He also mentioned he spoke with a friend from recovery and the talked about having a baseline. Kirby shared he did not feel grounded in anything other than IOP at this time. I reminded him that his sobriety is also grounding and hopefully he will feel more grounded once he finds a low stress job as well.     Mely Castorena M.Ed., [facility], [facility]

## 2024-03-27 ENCOUNTER — CLINICAL SUPPORT (OUTPATIENT)
Dept: BEHAVIORAL HEALTH | Facility: CLINIC | Age: 34
End: 2024-03-27
Payer: COMMERCIAL

## 2024-03-27 DIAGNOSIS — F25.0 SCHIZOAFFECTIVE DISORDER, BIPOLAR TYPE (MULTI): ICD-10-CM

## 2024-03-27 PROCEDURE — 90853 GROUP PSYCHOTHERAPY: CPT | Mod: GT,HE,U2
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
## 2024-03-27 NOTE — GROUP NOTE
Group Topic: Feeling Awareness/Expression   Group Date: 3/27/2024  Start Time: 10:00 AM  End Time: 11:00 AM  Facilitators: GAB Pascal; GAB Pham   Department: [facility]    Number of Participants: 11   Group Focus: coping skills, feeling awareness/expression, mindfulness, and self-awareness  Treatment Modality: Behavior Modification Therapy, Cognitive Behavioral Therapy, Patient-Centered Therapy, and Psychoeducation  Interventions utilized were exploration, group exercise, and patient education  Purpose: coping skills, feelings, self-care, and trigger / craving management    Name: Hussain Villafana YOB: 1990   MR: [id number]    This session was conducted via HIPAA compliant video. Patient was provided with informed consent.    Date: 3/27/24  Time: 10:00 am to 11:00 am  Group Members: 11 Number of Staff: 2  [email or web address]    Group Topic: Grounding   Group members were provided a handout that categorized cognitive, physical and self-soothing ways to ground oneself. The group also watched a brief video that explained TIPP techniques.     Patient was present on camera.  He appeared attentive as the information and video were reviewed. He provided some feedback to fellow group members who were processing frustrations with loved ones refusing to work on themselves. Kirby shared coming to realize and understand people will not change until they are internally motivated to do so and that it's important for his own mental health to come to terms with this fact.    Primary Facilitator - SADIQ Wolf, [facility]  Secondary Facilitator -Preston Menjivar, [place]  Supervisor SADIQ Fajardo, ATR

## 2024-03-27 NOTE — GROUP NOTE
Group Topic: Coping Skills   Group Date: 3/27/2024  Start Time:  9:00 AM  End Time: 10:00 AM  Facilitators: GAB Pham; GAB Pascal   [facility]    This was a video IOP group on a HIPAA compliant platform. All patients were provided with informed consent.     Date: 3/27/2024, Time: 9-10a, Number of Patients: 11, User Name: [email or web address],  Number of Staff: 2  Group topic(s): coping plans & cards. The group continued conversation on coping and patients then engaged in creating coping cards which allowed them to identify stressors and/or symptoms they experience. On the other side of their card they identified a coping plan (emotion and problem focused) as well as self-care to utilize. Group discussion followed.      Name: Hussain Villafana YOB: 1990   MR: [id number]    Kirby was on topic and attentive. Patient appeared to follow along and maintained nonverbal engagement throughout exercise.   GAB Reed    Secondary facilitator: DALI Schmidt  CT supervised by Amy Juarez, GAB-S, ATR

## 2024-03-27 NOTE — GROUP NOTE
Group Topic: Feeling Awareness/Expression   Group Date: 3/27/2024  Start Time: 11:00 AM  End Time: 12:00 PM  Facilitators: GAB Pascal; GAB Pham   Department: [facility]    Number of Participants: 11   Group Focus: coping skills, feeling awareness/expression, mindfulness, and self-awareness  Treatment Modality: Behavior Modification Therapy, Cognitive Behavioral Therapy, and Patient-Centered Therapy  Interventions utilized were exploration and group exercise  Purpose: coping skills, feelings, self-care, and trigger / craving management    Name: Hussain Villafana YOB: 1990   MR: [id number]    This session was conducted via HIPAA compliant video. Patient was provided with informed consent.    Date: 3/27/24  Time: 11:00 am to 12:00 pm  Group Members: 11 Number of Staff: 2  [email or web address]    Group Topic: Grounding continued  Group members discussed takeaways from the grounding techniques reviewed. Each member then wrote down 3 grounding techniques they were committed to practicing and shared these out loud.      Patient was present on camera.  Kirby shared breathing, stretching, utilizing positive thoughts help him redirect his thoughts and anxiety.     Primary Facilitator - SADIQ Wolf, [facility]  Secondary Facilitator -Preston Menjivar, [place]  Supervisor SADIQ Fajardo, ATR

## 2024-03-28 ENCOUNTER — TELEMEDICINE (OUTPATIENT)
Dept: BEHAVIORAL HEALTH | Facility: CLINIC | Age: 34
End: 2024-03-28
Payer: COMMERCIAL

## 2024-03-28 ENCOUNTER — CLINICAL SUPPORT (OUTPATIENT)
Dept: BEHAVIORAL HEALTH | Facility: CLINIC | Age: 34
End: 2024-03-28
Payer: COMMERCIAL

## 2024-03-28 DIAGNOSIS — Z79.899 MEDICATION MANAGEMENT: ICD-10-CM

## 2024-03-28 DIAGNOSIS — F25.0 SCHIZOAFFECTIVE DISORDER, BIPOLAR TYPE (MULTI): ICD-10-CM

## 2024-03-28 DIAGNOSIS — F43.10 PTSD (POST-TRAUMATIC STRESS DISORDER): ICD-10-CM

## 2024-03-28 DIAGNOSIS — F25.0 SCHIZOAFFECTIVE DISORDER, BIPOLAR TYPE (MULTI): Primary | ICD-10-CM

## 2024-03-28 PROCEDURE — 99214 OFFICE O/P EST MOD 30 MIN: CPT | Performed by: NURSE PRACTITIONER

## 2024-03-28 PROCEDURE — 3008F BODY MASS INDEX DOCD: CPT | Performed by: NURSE PRACTITIONER

## 2024-03-28 PROCEDURE — 90853 GROUP PSYCHOTHERAPY: CPT | Mod: GT,HE,U2

## 2024-03-28 ASSESSMENT — ENCOUNTER SYMPTOMS: CONSTITUTIONAL NEGATIVE: 1

## 2024-03-28 NOTE — GROUP NOTE
Group Topic: Self Esteem   Group Date: 3/26/2024  Start Time: 10:00 AM  End Time: 11:00 AM  Facilitators: Joan Nicholson PsyD; GAB Pascal   Department: [facility]    Number of Participants: 11   Group Focus: self-esteem  Treatment Modality: Psychoeducation  Interventions utilized were exploration and patient education  Purpose: self-worth    This was a video IOP group on a HIPAA compliant platform. All patients were provided with informed consent.    Group topic: Self-worth and self-esteem  Group members received psychoeducation on the concept of self-worth and how it is different from self-esteem. They explored definition of self-worth and ways to increase this through affirmations and through reframing.   Joan Nicholson Psy.D.  Secondary facilitator: DALI Schmidt  Supervisor Amy Juarez, [facility]-S, ATR      Name: Hussain Villafana YOB: 1990   MR: [id number]      Kirby was present and actively engaged in discussion. He shared that he feels self-worth can be a Oriental orthodox concept.

## 2024-03-28 NOTE — GROUP NOTE
Group Topic: Self Esteem   Group Date: 3/26/2024  Start Time: 11:00 AM  End Time: 12:00 PM  Facilitators: Joan Nicholson PsyD; GAB Pascal   Department: [facility]    Number of Participants: 11   Group Focus: self-esteem  Treatment Modality: Psychoeducation  Interventions utilized were exploration and patient education  Purpose: self-worth    This was a video IOP group on a HIPAA compliant platform. All patients were provided with informed consent.    Group topic: Self-esteem and self-worth  Group members received psychoeducation on strategies to enhance self-esteem. They answered questions related to positive attributes and explored how these impact their view of self.   Joan Nicholson Psy.D.  Secondary facilitator: DALI Schmidt  Supervisor Amy Juarez, [facility]-S, ATR      Name: Hussain Villafana YOB: 1990   MR: [id number]      Hussain was present and actively engaged in discussion. He stated that he overcame challenges such as alcohol use and destructive relationships with women and his parents.

## 2024-03-28 NOTE — GROUP NOTE
Group Topic: Social Skills   Group Date: 3/28/2024  Start Time: 10:00 AM  End Time: 11:00 AM  Facilitators: GAB Pham; GAB Pascal   [facility]    This was a video IOP group on a HIPAA compliant program. All patients were provided with informed consent.     Date: 3/28/2024, Time: 10-11a, Number of Patients: 10, Username: [email or web address], Number of Staff: 2  Group Topic(s): communication styles. The group began by exploring the concept of communication and identifying positive and negative examples of communication. Individuals shared their own experiences and discussed types of communication they have seen. The group continued conversation on positive and negative communication skills and explored types of communicating including passive, aggressive, and passive aggressive. As a whole the group identified physical presentation, emotional components, behavioral cues, and beliefs within the different types of communication. Group discussion followed.        Name: Hussain Villafana YOB: 1990   MR: [id number]      Kirby was on topic and attentive. Patient engaged fully and followed along appropriately.   GAB Reed    Secondary facilitator: DALI Schmidt   CT supervised by Amy Juarez, [facility]AB[facility], [facility]
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
## 2024-03-28 NOTE — GROUP NOTE
Group Topic: Social Skills   Group Date: 3/28/2024  Start Time: 11:00 AM  End Time: 12:00 PM  Facilitators: GAB Pham; GAB Pascal   [facility]    This was a video IOP group on a HIPAA compliant program. All patients were provided with informed consent.     Date: 3/28/2024, Time: 11a-12p, Number of Patients: 9, Username: [email or web address], Number of Staff: 2  Group Topic(s): communication styles. The group continued conversation on positive and negative communication skills and explored types of communicating including assertive. As a whole the group identified physical presentation, emotional components, behavioral cues, and beliefs within the different types of communication. Examples of assertiveness were explored including the assertion formula and the what, when, where, why, and how techniques were discussed. Group discussion followed.     Name: Hussain Villafana YOB: 1990   MR: [id number]      Kirby was on topic and present. Patient engaged appropriately in small group discussion and shared examples of his own communication. Patient shared in the work setting often being an aggressive communicator and in his personal relationships (with mom and ex-gf) often being passive.   GAB Reed    Secondary facilitator: DALI Schmidt  CT supervised by Amy Juarez, GAB-S, ATR

## 2024-03-29 ENCOUNTER — DOCUMENTATION (OUTPATIENT)
Dept: BEHAVIORAL HEALTH | Facility: CLINIC | Age: 34
End: 2024-03-29
Payer: COMMERCIAL

## 2024-03-29 NOTE — PROGRESS NOTES
Date: 2023  Time: 11:11 AM    Admission H&P  Nichelle Soriano,  1983, MRN [id number]    PCP: Tonia Marsh, [phone]  Primary OB: Nery   Code status:  No Order              Chief Complaint: Repeat        OBSTETRICAL / DATING HISTORY:  Estimated Date of Delivery: Estimated Date of Delivery: 2023  Gestational Age: 38w6d    OB History    Para Term  AB Living   11 7 5 2 3 4   SAB IAB Ectopic Multiple Live Births   3 0 0 0 4      # Outcome Date GA Lbr Lexa/2nd Weight Sex Delivery Anes PTL Lv   11 Current            10  11 35w1d   F CS-LTranv   FRANK      Birth Comments: Unsuccessful TOLAC induction for hydrops, so C-sxn done      Name: Celeste   9 Term 09 39w0d  3.062 kg (6 lb 12 oz) F    FRANK      Name: Alla   8 Term 08 40w0d  3.487 kg (7 lb 11 oz) M    FRANK      Name: Dewey   7 SAB  4w0d          6   33w5d   M    FD      Birth Comments: IUFD, hydrops   5 Term  40w0d  3.629 kg (8 lb) M    FRANK      Name: Rony   4 SAB  8w0d          3 SAB  8w0d          2 Term  40w0d   F CS-Unspec   FD      Birth Comments: IUFD, hydrops   1 Term                HPI:    Nichelle Soriano is a 40 year old year old at 38w6d weeks. She presented to L&D for planned repeat .  She has a history of 2 prior cesareans and desires repeat this pregnancy with tubal ligation.  Delivery is indicated <39 weeks with history of IUFD x 2.       Medical History  No past medical history on file.    Surgical History  Past Surgical History:   Procedure Laterality Date      SECTION         Social History  Social History     Socioeconomic History     Marital status:      Spouse name: Not on file     Number of children: Not on file     Years of education: Not on file     Highest education level: Not on file   Occupational History     Not on file   Tobacco Use     Smoking status: Never     Smokeless tobacco: Never     Tobacco  INTENSIVE OUTPATIENT PROGRAM MULTIDISCIPLINARY  TREATMENT PLAN & PROGRESS NOTE     Patient: Hussain Villafana : 1990 Age: 33     Student: No  Treatment Team Date: 3/25/24 MRN#: [id number]   Attending Physician: Dr. RICKY Red MD  Date of IOP Admission: 24 Ref by: Self   Week: 6                        Admission Scores:   DASS21: 15 MILLER: 15 BDI: 12 PHQ-9: 5 MAST: 20 DAST: 0 MDQ:  Positive      Current Risk Assessment:  Suicidal Risk:         None:   X     Ideation:       Plan:      Intent:      SI Plan with plan contracts for safety:     Hx of harming self:        Homicidal Risk:     None:   X     Ideation:       Plan:      Intent:      HI Plan with means:                                     Hx of harming others:          Global Improvement  Clinical Global Impressions Rating Scale Of Illness:  5  1-No Illness Present   2-Minimal   3-Mild   4-Moderate   5-Marked X   6-Severe     7-Very Severe    Diagnoses & ICD-10 Codes  Primary Diagnosis & Code: Schizoaffective Disorder, Bipolar Type; F25.0     Psychosocial & Environmental Stressors:   1. Recent psychiatric hospitalization   2. Lack of social support   3. Work/career difficulties     Patient's Treatment Goals:                 Date Initiated:           Progress Update:               1.  Attend and actively participate in IOP _4_ days/week for 3 hours per day  2024   Good X     Fair        Poor  Unclear     2.  Attend weekly medication management sessions and maintain compliance of medications  2024      Good X     Fair        Poor  Unclear                                                     3.  Identify symptoms of diagnoses and developing appropriate coping plans  2024   Good X     Fair        Poor  Unclear                                 4.  Decreasing irrational thoughts and reintegrating into daily life  2024  Good         Fair X    Poor  Unclear        Current medications:  See EMR chart       Progress note:  __New to the IOP  comments:     no passive exposure   Substance and Sexual Activity     Alcohol use: No     Drug use: No     Sexual activity: Not on file   Other Topics Concern     Not on file   Social History Narrative     Not on file     Social Determinants of Health     Financial Resource Strain: Not on file   Food Insecurity: Not on file   Transportation Needs: Not on file   Physical Activity: Not on file   Stress: Not on file   Social Connections: Not on file   Intimate Partner Violence: Not on file   Housing Stability: Not on file       Allergies   Allergen Reactions     No Known Drug Allergy Unknown       Medications Prior to Admission   Medication Sig Dispense Refill Last Dose     acetaminophen (TYLENOL) 500 MG tablet Take 1-2 tablets (500-1,000 mg) by mouth every 6 hours as needed for mild pain 90 tablet 1      Prenatal Vit-Fe Fumarate-FA (PRENATAL MULTIVITAMIN W/IRON) 27-0.8 MG tablet Take 1 tablet by mouth daily 90 tablet 3        Review of Systems:  Otherwise negative except per HPI    Physical Exam:       LMP 09/09/2022 (Exact Date)     General: NAD  CV: RRR  Lungs: clear  Abdomen: soft, gravid  SVE deferred    Pertinent Labs  Lab Requisition on 06/08/2023   Component Date Value Ref Range Status     Group B Strep PCR 06/08/2023 Negative  Negative Final    Presumed negative for Streptococcus agalactiae (Group B Streptococcus) or the number of organisms may be below the limit of detection of the assay.   Lab Requisition on 04/20/2023   Component Date Value Ref Range Status     WBC Count 04/20/2023 6.9  4.0 - 11.0 10e3/uL Final     RBC Count 04/20/2023 4.43  3.80 - 5.20 10e6/uL Final     Hemoglobin 04/20/2023 9.5 (L)  11.7 - 15.7 g/dL Final     Hematocrit 04/20/2023 32.0 (L)  35.0 - 47.0 % Final     MCV 04/20/2023 72 (L)  78 - 100 fL Final     MCH 04/20/2023 21.4 (L)  26.5 - 33.0 pg Final     MCHC 04/20/2023 29.7 (L)  31.5 - 36.5 g/dL Final     RDW 04/20/2023 14.6  10.0 - 15.0 % Final     Platelet Count 04/20/2023 386  150  program, attending as planned  __Compliant, Progressing & Improving - Needs more time in IOP therapy program   _X_Compliant, Progressing & Improving Planning Discharge   __Compliant, Not Progressing or Improving: Reason  __Non-Compliant, not progressing or improving: Plan  __Other:     Insurance & Benefits: [place] [employer], IN NETWORK, BENEFITS ARE BASED ON MEDICAL NECESSITY ONLY: Unlimited visits, covers 100% of the contracted rate after deductible has been met.     Co-Pay per day: $0    DEDUCTIBLE        Single: / Family: / Accumulation:  Single: / Family: /  CO- INSURANCE  Single: / Family: / Accumulation:  Single: /  Family: /    OUT OF POCKET  Single: / Family: / Accumulation:  Single: /  Family: /     Total IOP Sessions completed & authorized to date:  19    Discharge plans have been discussed with patient & medication management provider on:   Reason for discharge:    ___Successfully completed IOP treatment:   ___Pt. decided to seek treatment elsewhere:   ___Dropped out or no show more than three times:  ___Benefits exhausted:   ___Other:    Discharge date:                     Discharge Prognosis: Excellent      Good     Fair     Poor    Follow up appointment with: Physician:   Follow up appointment with: Therapist:    Follow up Aftercare group?  Yes   Patient provided with Crisis Hotline phone number for suicide prevention? Yes     Discharge Scores: Not Completed    Emergency Treatment Plan Completed by patient: Yes__  No__          DASS21:   MILLER:   BDI:   Treatment plan electronically signed by Treatment Team:   GABRIEL Castorena, LPCC-S, [facility], PABLO Plasencia, GAB, GABRIEL Nicholson, PsMickey, GABRIEL Gaytan, APRN-CNP   - 450 10e3/uL Final     Ferritin 04/20/2023 14  6 - 175 ng/mL Final   Lab Requisition on 04/20/2023   Component Date Value Ref Range Status     Treponema Antibody Total 04/20/2023 Nonreactive  Nonreactive Final   Lab on 02/02/2023   Component Date Value Ref Range Status     Performing Laboratory 02/02/2023 [facility]   Final     Test Name 02/02/2023 Carrier screen   Final     Test Code 02/02/2023 NA   Final   Lab Requisition on 01/25/2023   Component Date Value Ref Range Status     Alpha Feto Protein 01/25/2023 36  ng/mL Final     MoM for AFP 01/25/2023 0.92   Final     AFP Interpretation 01/25/2023 Screen Neg   Final    INTERPRETATION: SCREEN NEGATIVE for open spina bifida                                Neural Tube Defects (NTD)   Negative                                                                 Pre-Test     Post-Test      Cutoff                                       Neural Tube Defects Risks   1:1030       < 1:99333    1:250                                          Comments:                                                   The risk of an open neural tube defect is less than the  screening cut-off.    This test was developed and its performance characteristics   determined by [facility]. It has not been cleared or   approved by the US Food and Drug Administration. This test   was performed in a CLIA certified laboratory and is   intended for clinical purposes.     Maternal Age at Delivery 01/25/2023 41.4  yr Final     Patient Weight 01/25/2023 144.0 lbs.   Final     Estimated Due Date 01/25/2023 07-07-23   Final     Gestational Age Calculated 01/25/2023 16 wks, 5 days   Final     Dating 01/25/2023 Other   Final     Num of Fetuses 01/25/2023 [facility]   Final     Maternal Race 01/25/2023 Nonblack   Final     Insulin Diabetic 01/25/2023 No   Final     Smoking Status 01/25/2023 No   Final     Fam History of NTD 01/25/2023 Unknown   Final     Specimen Iteration 01/25/2023 See Note   Final    Initial  sample  Performed by [facility],  [address] [place] [address],[place] [id number] [phone]  [email or web address], Ramirez Tariq MD, PHD, Lab. Director     Hold Specimen 01/25/2023 [facility]   Final   Lab Requisition on 12/28/2022   Component Date Value Ref Range Status     Culture 12/28/2022 No Growth   Final     Hepatitis C Antibody 12/28/2022 Nonreactive  Nonreactive Final     Hepatitis B Surface Antigen 12/28/2022 Nonreactive  Nonreactive Final     HIV Antigen Antibody Combo 12/28/2022 Nonreactive  Nonreactive Final    HIV-1 p24 Ag & HIV-1/HIV-2 Ab Not Detected     Treponema Antibody Total 12/28/2022 Nonreactive  Nonreactive Final     Chlamydia Trachomatis 12/28/2022 Negative  Negative Final    Negative for C. trachomatis rRNA by transcription mediated amplification.   A negative result by transcription mediated amplification does not preclude the presence of infection because results are dependent on proper and adequate collection, absence of inhibitors and sufficient rRNA to be detected.     Neisseria gonorrhoeae 12/28/2022 Negative  Negative Final    Negative for N. gonorrhoeae rRNA by transcription mediated amplification. A negative result by transcription mediated amplification does not preclude the presence of C. trachomatis infection because results are dependent on proper and adequate collection, absence of inhibitors and sufficient rRNA to be detected.     WBC Count 12/28/2022 7.3  4.0 - 11.0 10e3/uL Final     RBC Count 12/28/2022 5.31 (H)  3.80 - 5.20 10e6/uL Final     Hemoglobin 12/28/2022 11.3 (L)  11.7 - 15.7 g/dL Final     Hematocrit 12/28/2022 36.4  35.0 - 47.0 % Final     MCV 12/28/2022 69 (L)  78 - 100 fL Final     MCH 12/28/2022 21.3 (L)  26.5 - 33.0 pg Final     MCHC 12/28/2022 31.0 (L)  31.5 - 36.5 g/dL Final     RDW 12/28/2022 16.3 (H)  10.0 - 15.0 % Final     Platelet Count 12/28/2022 373  150 - 450 10e3/uL Final     % Neutrophils 12/28/2022 71  % Final     % Lymphocytes 12/28/2022 18  % Final     %  Monocytes 12/28/2022 7  % Final     % Eosinophils 12/28/2022 3  % Final     % Basophils 12/28/2022 0  % Final     % Immature Granulocytes 12/28/2022 1  % Final     NRBCs per 100 WBC 12/28/2022 0  <1 /100 Final     Absolute Neutrophils 12/28/2022 5.3  1.6 - 8.3 10e3/uL Final     Absolute Lymphocytes 12/28/2022 1.3  0.8 - 5.3 10e3/uL Final     Absolute Monocytes 12/28/2022 0.5  0.0 - 1.3 10e3/uL Final     Absolute Eosinophils 12/28/2022 0.2  0.0 - 0.7 10e3/uL Final     Absolute Basophils 12/28/2022 0.0  0.0 - 0.2 10e3/uL Final     Absolute Immature Granulocytes 12/28/2022 0.0  <=0.4 10e3/uL Final     Absolute NRBCs 12/28/2022 0.0  10e3/uL Final     ABO/RH(D) 12/28/2022 B POS   Final     Antibody Screen 12/28/2022 Negative  Negative Final     SPECIMEN EXPIRATION DATE 12/28/2022 [id number]   Final     Hold Specimen 12/28/2022 [facility]   Final   Lab Requisition on 12/28/2022   Component Date Value Ref Range Status     Interpretation 12/28/2022 Negative for Intraepithelial Lesion or Malignancy (NILM)    Final     Comment 12/28/2022    Final                    Value:This result contains rich text formatting which cannot be displayed here.     Specimen Adequacy 12/28/2022 Satisfactory for evaluation, endocervical/transformation zone component present   Final     Clinical Information 12/28/2022    Final                    Value:This result contains rich text formatting which cannot be displayed here.     LMP/Menopause Date 12/28/2022    Final                    Value:This result contains rich text formatting which cannot be displayed here.     Reflex Testing 12/28/2022 Yes regardless of result   Final     Previous Abnormal? 12/28/2022    Final                    Value:This result contains rich text formatting which cannot be displayed here.     Previous Abnormal Diagnosis 12/28/2022    Final                    Value:This result contains rich text formatting which cannot be displayed here.     Performing Labs 12/28/2022     Final                    Value:This result contains rich text formatting which cannot be displayed here.     Other HR HPV 2022 Negative  Negative Final     HPV16 DNA 2022 Negative  Negative Final     HPV18 DNA 2022 Negative  Negative Final     FINAL DIAGNOSIS 2022    Final                    Value:This result contains rich text formatting which cannot be displayed here.   Office Visit on 2022   Component Date Value Ref Range Status     hCG Urine Qualitative 2022 Positive (A)  Negative Final    This test is for screening purposes.  Results should be interpreted along with the clinical picture.  Confirmation testing is available if warranted by ordering [id number], HCG Quantitative Pregnancy.     GBS Negative    Pertinent Radiology:      Impression/Plan:  1. IUP at 38w6d weeks, history of  x 2 desiring repeat.  Multiparity desiring sterilization.  -Reviewed risks of  including pain, bleeding, infection, damage to nearby organs including but not limted to bowel, bladder, ureters possibly requiring additional surgery for repair.  Reviewed risks of blood transfusion.  Patient agrees if needed.  Discussed plan to remove entire fallopian tube on each side, discussed that this is completely permanent and can never be reversed.  All questions answered.  -2 prior IUFD's, with fetal hydrops, known alpha thalassemia and 2 IUFD's with hydrops.  Last baby followed closely with MFM and delivered via  at 35 weeks with severe anemia.  This pregnancy she did a non-invasive screening test which was reassuring, but still a screening test.  Followed with MFM and MCA dopplers have been reassuring.  With history of prior IUFD, delivering <39 weeks.      Provider: Meaghan Gabriel MD    Date: 2023  Time: 11:11 AM    EUFEMIA Garcia 1983, MRN [id number]

## 2024-03-29 NOTE — GROUP NOTE
Group Topic: Goals   Group Date: 3/28/2024  Start Time:  9:00 AM  End Time: 10:00 AM  Facilitators: GAB Pham; GAB Pascal   Department: [facility] [place]        Name: Hussain Villafana YOB: 1990   MR: [id number]      This was a video IOP group on a HIPAA compliant platform. All patients were provided with informed consent.   Date: 03/28/2024, Time: 9-10a, Number of Patients: 10, User Name: SADIQ Guerin, ATR, Number of Staff: 2  Group topic(s): SMART goal setting group today. Group members identified achievement goals, enjoyment goals, and social connection goals - as well as anticipated obstacles and coping skills. After taking some time to write out their goals, obstacles, and coping skills, each group member shared what they had written. Kirby set goals to continue with step-work, investigate online film school, reconnect with his AA sponsor, go to Hinduism, and watch a movie. His main obstacle is time this weekend. He plans to use meditation and walking as coping skills.    Primary Facilitator: Preston Menjivar CT  Supervised by Amy Rosas, GAB-S, ATR     Secondary Facilitator: SADIQ Wolf, [facility]

## 2024-04-01 ENCOUNTER — CLINICAL SUPPORT (OUTPATIENT)
Dept: BEHAVIORAL HEALTH | Facility: CLINIC | Age: 34
End: 2024-04-01
Payer: COMMERCIAL

## 2024-04-01 DIAGNOSIS — F25.0 SCHIZOAFFECTIVE DISORDER, BIPOLAR TYPE (MULTI): ICD-10-CM

## 2024-04-01 PROCEDURE — 90853 GROUP PSYCHOTHERAPY: CPT | Mod: GT,HE,U2

## 2024-04-01 NOTE — GROUP NOTE
Group Topic: Goals   Group Date: 4/1/2024  Start Time:  9:00 AM  End Time: 10:00 AM  Facilitators: GAB Pham; GAB Pascal   [facility]    This was a video IOP group on a HIPAA compliant program. All patients were provided with informed consent.     Date: 4/1/2024, Time: 9-10a, Number of Patients: 8, Username: [email or web address], Number of Staff: 2  Group Topic(s): goal check-in. the group engaged in following up on goals set before the weekend. Individually, patients shared the goals they set for themselves and expressed how well they felt they worked towards their goals. Finally, patients set weekly intentions of goals to work towards as a means of holding one another accountable. Group discussion followed.     Name: Hussain Villafana YOB: 1990   MR: [id number]      Kirby was attentive and on topic. Patient checked in on his weekend and shared having a decent weekend. 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 Kirby shared that he was able to work on film school but did not get to a CODA meeting and was unable to call his sponsor over the weekend. Patient shared connecting with his parents via text and having plans to connect with a friend today.   GAB Reed    Secondary facilitator: DALI Schmidt  CT supervised by Amy Juarez, CLAUC-S, ATR

## 2024-04-01 NOTE — GROUP NOTE
Group Topic: Coping Skills   Group Date: 4/1/2024  Start Time: 11:00 AM  End Time: 12:00 PM  Facilitators: Heena Plasencia [facility]; GAB Pascal   Department: [facility]        Name: Hussain Villafana YOB: 1990   MR: [id number]      This was a video IOP group on a HIPAA compliant platform. All patients were provided with informed consent.   Date: 04/01/2024, Time: 11a-12p, Number of Patients: 7, User Name: Truong, [facility], ATR, Number of Staff: 2  Group topic(s): Today's topic was thought logs. The group was first briefly introduced to the cognitive-behavioral model, followed by a discussion around automatic thoughts. The group was then introduced to the concept of Socratic questioning, which they engaged with and shared on. Lastly, the group was introduced to the skill of thought logs. After explaining the concept, the group complete their own, and later shared. Kirby did not participate during the 11am but was attentive and engaged.     Primary Facilitator: DALI Schmidt  Supervised by Amy Rosas, [facility]-S, ATR     Secondary Facilitator: Heena Plasencia [facility]

## 2024-04-01 NOTE — GROUP NOTE
Group Topic: Coping Skills   Group Date: 4/1/2024  Start Time: 10:00 AM  End Time: 11:00 AM  Facilitators: Heena Plasencia [facility]; Mely Castorena [facility]   Department: [facility]        Name: Hussain Villafana YOB: 1990   MR: [id number]      This was a video IOP group on a HIPAA compliant platform. All patients were provided with informed consent.   Date: 04/01/2024, Time: 10-11a, Number of Patients: 7, User Name: Truong [facility]-S, ATR, Number of Staff: 2  Group topic(s): Today's topic was thought logs. The group was first briefly introduced to the cognitive-behavioral model, followed by a discussion around automatic thoughts. The group was then introduced to the concept of Socratic questioning, which they engaged with and shared on. Lastly, the group was introduced to the skill of thought logs. After explaining the concept, the group complete their own, and later shared. Kirby shared that the question in Socratic questioning of “where did this belief come from,” was helpful for him. He was able to take a closer look at where his belief came from, and gain insight into his relationship with his parents.     Primary Facilitator: DALI Schmidt  Supervised by Amy Rosas, [facility]-S, ATR     Secondary Facilitator: Heena lPasencia [facility]

## 2024-04-02 ENCOUNTER — CLINICAL SUPPORT (OUTPATIENT)
Dept: BEHAVIORAL HEALTH | Facility: CLINIC | Age: 34
End: 2024-04-02
Payer: COMMERCIAL

## 2024-04-02 ENCOUNTER — DOCUMENTATION (OUTPATIENT)
Dept: BEHAVIORAL HEALTH | Facility: CLINIC | Age: 34
End: 2024-04-02
Payer: COMMERCIAL

## 2024-04-02 DIAGNOSIS — F25.0 SCHIZOAFFECTIVE DISORDER, BIPOLAR TYPE (MULTI): ICD-10-CM

## 2024-04-02 PROCEDURE — 90853 GROUP PSYCHOTHERAPY: CPT | Mod: GT,HE,U2

## 2024-04-02 NOTE — PROGRESS NOTES
Time: 11:30-11:40am    Met with patient jimbo Rodriguez to check in on progress in IOP and explore discharge from [facility]. Pt. is discharged from [facility] as of Thursday April 4th, 2024. Pt. plans to follow up with  medication management provider and individual therapist. Pt. was offered information and education on discharge and [facility] Aftercare programing. Pt. completed CSSRS and was deemed low suicide risk.  Please see CSSRS below.    SAFE-T Protocol with C-SSRS  STEP 1: Identify Risk Factors  In the past month:  1) Wish to be dead - No   2) Current suicidal thoughts - No   3) Suicidal thoughts with method - No   4) Suicidal intent without specific plan - No   5) Intent with plan - No     C-SSRS Suicidal Behavior:  Have you ever done anything, started to do anything, or prepared to do anything to end your life?  Lifetime: No   Past 3 months: No      Current and Past Psychiatric Dx: Mood disorder, Psychotic Disorder      Presenting Symptoms: Denied      Family History: Denied      Precipitants/Stressors: Triggering events leading to humiliation, shame and/or despair, inadequate social support      Change in Treatment: Denied      Access to lethal methods?: Denied owning guns or having access to firearms      STEP 2: Identify Protective Factors  Internal:  Ability to cope with stress - Yes   Frustration tolerance - Yes   Quaker beliefs - No   Fear of death or the actual act of killing self - Yes   Identifies reasons for living - Yes      External:  Cultural, spiritual and/or moral attitudes against suicide - No   Responsibilities to children - No   Beloved pets - No   Supportive social network of family or friends - Yes   Positive therapeutic relationships - Yes   Engaged in work or school - No     STEP 3: Specific questioning about thoughts, plans and suicidal intent  Rated on a severity scale of 1-5  Frequency (how many times have you had these thoughts?): 0  Duration (when you have the thoughts how long do they last?):  0  Controllability (could/can you stop thinking about killing yourself or wanting to die if you want to?): 0  Deterrents (are there things - anyone or anything - that stopped you from wanting to die or acting on thoughts of suicide?): 0  Reasons for ideation (what sort of reasons did you have for thinking about wanting to die or killing  yourself?): 0     STEP 4: Guidelines to determine level of risk and develop interventions to lower risk level  High:  Moderate:   Low: X    Heena Plasencia [facility]

## 2024-04-02 NOTE — GROUP NOTE
Group Topic: Feeling Awareness/Expression   Group Date: 4/2/2024  Start Time:  9:00 AM  End Time: 10:00 AM  Facilitators: GAB Pascal; Joan Nicholson PsyD   Department: [facility]    Number of Participants: 12   Group Focus: psychiatric education  Treatment Modality: Cognitive Behavioral Therapy and Psychoeducation  Interventions utilized were patient education  Purpose: maladaptive thinking, feelings, and insight or knowledge    Name: Hussain Villafana YOB: 1990   MR: [id number]    This session was conducted via HIPAA compliant video. Patient was provided with informed consent.  Date: 4/2/24  Time: 9:00 am to 10:00 am  Group Members: 12 Number of Staff: 2  [email or web address]    Group Topic: Psychoeducation of CBT and the cognitive behavioral model. Group members started out with introductions and welcomed two new group members. Group members began the session with a brief educational video about CBT. They were then provided psycho-education of how CBT is evidenced-based and effective with a wide variety of mental health issues. The group was provided a handout to illustrate the connection between events, thoughts/beliefs, emotions and behavior.     Patient was present on camera. He introduced himself to the new group members and suggested that they practice the skills they learn outside of group time.  He was attentive and engaged as the information was shared and explained in detail.     Primary Facilitator GAB Wolf-S, [facility]  Secondary Facilitator Preston Menjivar, [place]  Supervisor SADIQ Fajardo, ATR

## 2024-04-03 ENCOUNTER — CLINICAL SUPPORT (OUTPATIENT)
Dept: BEHAVIORAL HEALTH | Facility: CLINIC | Age: 34
End: 2024-04-03
Payer: COMMERCIAL

## 2024-04-03 DIAGNOSIS — F25.0 SCHIZOAFFECTIVE DISORDER, BIPOLAR TYPE (MULTI): Primary | ICD-10-CM

## 2024-04-03 PROCEDURE — 90853 GROUP PSYCHOTHERAPY: CPT | Mod: GT,HE,U2

## 2024-04-03 NOTE — GROUP NOTE
Group Topic: Feeling Awareness/Expression   Group Date: 4/3/2024  Start Time: 10:00 AM  End Time: 11:00 AM  Facilitators: GAB Pascal   Department: [facility]    Number of Participants: 8   Group Focus: feeling awareness/expression, mindfulness, reality orientation, and self-awareness  Treatment Modality: Cognitive Behavioral Therapy, Patient-Centered Therapy, and Solution-Focused Therapy  Interventions utilized were confrontation, exploration, and patient education  Purpose: coping skills, feelings, insight or knowledge, and trigger / craving management    Name: Hussain Villafana YOB: 1990   MR: [id number]    This session was conducted via HIPAA compliant video. Patient was provided with informed consent.  Date: 4/3/24  Time: 10:00 am to 11:00 pm  Group Members: 8  Number of Staff: 2  [email or web address]    Group Topic: Emotions as tied to CBT model. Physiological responses to emotions. The group briefly reviewed the cognitive behavioral model followed by a viewing of an educational TedTalk about staying present as we feel the bodily sensations attached to unpleasant emotions. The group then processed takeaways from the educational video and how the various concepts are applicable to their well-being.     Patient was present on camera. He appeared attentive during the educational video. 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 He shared finding it difficult to sit with the feeling of shame.     Primary Facilitator SADIQ Wolf [facility]  Secondary Facilitator DALI Schmidt  Supervisor SADIQ Wolf LICDC

## 2024-04-03 NOTE — GROUP NOTE
"Group Topic: Feeling Awareness/Expression   Group Date: 4/2/2024  Start Time: 11:00 AM  End Time: 12:00 PM  Facilitators: Joan Nicholson PsyD; Mely Castorena [facility]   Department: [facility]    Number of Participants: 12   Group Focus: other Cognitive Distortions  Treatment Modality: Cognitive Behavioral Therapy and Psychoeducation  Interventions utilized were exploration and patient education  Purpose: insight or knowledge    This was a video IOP group on a HIPAA compliant platform. All patients were provided with informed consent.    Group topic: Cognitive Distortions  Group members identified cognitive distortions in their own thinking. They discussed recent situations and explored ways to reframe their errors in thinking.   Joan Nicholson Psy.D.  Secondary facilitator: DALI Schmidt  Supervisor Amy Juarez, [facility]-S, ATR      Name: Hussain Villafana YOB: 1990   MR: [id number]      Hussain was present and actively engaged in discussion. He shared example being rejected when applying to new jobs. He stated that he feels it is important to validate his feelings but to also reframe distorted thinking, such as \"I have no skills.\"         "

## 2024-04-03 NOTE — GROUP NOTE
Group Topic: Feeling Awareness/Expression   Group Date: 4/2/2024  Start Time: 10:00 AM  End Time: 11:00 AM  Facilitators: Joan Nicholson PsyD; GAB Pascal   Department: [facility]    Number of Participants: 11   Group Focus: other Cognitive Distortions  Treatment Modality: Cognitive Behavioral Therapy and Psychoeducation  Interventions utilized were exploration and patient education  Purpose: insight or knowledge    This was a video IOP group on a HIPAA compliant platform. All patients were provided with informed consent.    Group topic: Cognitive Distortions  Group members shared how they had engaged in self-esteem enhancing strategies over the week. They received education on the cognitive model and identifying negative thoughts. Participants received psycho-education on the various cognitive distortions.   Joan Nicholson Psy.D.  Secondary facilitator: DALI Schmidt  Supervisor Amy Juarez, [facility]-S, ATR      Name: Hussain Villafana YOB: 1990   MR: [id number]      Hussain was present and actively listened to discussion.

## 2024-04-03 NOTE — GROUP NOTE
Group Topic: Feeling Awareness/Expression   Group Date: 4/3/2024  Start Time: 11:00 AM  End Time: 12:00 PM  Facilitators: GAB Pascal   Department: [facility]    Number of Participants: 8   Group Focus: coping skills, feeling awareness/expression, mindfulness, and self-awareness  Treatment Modality: Behavior Modification Therapy and Patient-Centered Therapy  Interventions utilized were exploration, group exercise, and patient education  Purpose: coping skills, feelings, insight or knowledge, and trigger / craving management    Name: Hussain Villafana YOB: 1990   MR: [id number]    This session was conducted via HIPAA compliant video. Patient was provided with informed consent.    Date: 4/3/24  Time: 11:00 am to 12:00 pm  Group Members: 8  Number of Staff: 2  [email or web address]    Group Topic: The group focused on developing self-awareness of physical responses to various emotions such as anger, fear, sadness and happiness. Group members identified and wrote down their personal physical reactions for each emotion and shared out loud.     Patient was present on camera and was observed writing during the exercise.  He was observed writing during the exercise but he did not share out loud ways in which he experiences these various emotions.    Primary Facilitator - SADIQ Wolf LICDC  Secondary Facilitator - DALI Schmidt  Supervisor SADIQ Wolf, PABLO

## 2024-04-03 NOTE — GROUP NOTE
Group Topic: Coping Skills   Group Date: 4/3/2024  Start Time:  9:00 AM  End Time: 10:00 AM  Facilitators: GAB Pascal   Department: [facility]    Number of Participants: 8   Group Focus: clarity of thought, coping skills, and self-awareness  Treatment Modality: Cognitive Behavioral Therapy and Patient-Centered Therapy  Interventions utilized were exploration, group exercise, mental fitness, and patient education  Purpose: coping skills, maladaptive thinking, and insight or knowledge    Name: Hussain Villafana YOB: 1990   MR: [id number]    This session was conducted via HIPAA compliant video. Patient was provided with informed consent.  Date: 4/3/24  Time: 9:00 am to 10:00 am  Group Members: 7 Number of Staff: 2  [email or web address]    Group Topic: Automatic Thoughts   Group members started with a daily reading about being aware of the snowball effect of ones' thoughts. Group members discussed times when negative thoughts spiraled out of control. The group then learned thought stopping techniques to abruptly stop this pattern. The group then participated in a group activity where they identified a trigger that led to negative automatic thoughts and practiced challenging the unhelpful negative thoughts.     Patient was present on camera. He was attentive and engaged as the information was shared and explained in detail. He was observed writing during the automatic thought exercise.    Primary Facilitator SADIQ Wolf, [facility]  Secondary Facilitator Preston Menjivar, [place]  Supervisor SADIQ Fajardo, ATR

## 2024-04-04 ENCOUNTER — TELEMEDICINE (OUTPATIENT)
Dept: BEHAVIORAL HEALTH | Facility: CLINIC | Age: 34
End: 2024-04-04
Payer: COMMERCIAL

## 2024-04-04 ENCOUNTER — CLINICAL SUPPORT (OUTPATIENT)
Dept: BEHAVIORAL HEALTH | Facility: CLINIC | Age: 34
End: 2024-04-04
Payer: COMMERCIAL

## 2024-04-04 DIAGNOSIS — F25.0 SCHIZOAFFECTIVE DISORDER, BIPOLAR TYPE (MULTI): Primary | ICD-10-CM

## 2024-04-04 DIAGNOSIS — Z79.899 MEDICATION MANAGEMENT: ICD-10-CM

## 2024-04-04 DIAGNOSIS — F43.10 PTSD (POST-TRAUMATIC STRESS DISORDER): ICD-10-CM

## 2024-04-04 DIAGNOSIS — F25.0 SCHIZOAFFECTIVE DISORDER, BIPOLAR TYPE (MULTI): ICD-10-CM

## 2024-04-04 PROCEDURE — 3008F BODY MASS INDEX DOCD: CPT | Performed by: NURSE PRACTITIONER

## 2024-04-04 PROCEDURE — 99214 OFFICE O/P EST MOD 30 MIN: CPT | Performed by: NURSE PRACTITIONER

## 2024-04-04 PROCEDURE — 90853 GROUP PSYCHOTHERAPY: CPT | Mod: GT,HE,U2

## 2024-04-04 RX ORDER — RISPERIDONE 2 MG/1
2 TABLET ORAL 2 TIMES DAILY
Qty: 180 TABLET | Refills: 0 | Status: SHIPPED | OUTPATIENT
Start: 2024-04-04 | End: 2024-04-24 | Stop reason: SDUPTHER

## 2024-04-04 ASSESSMENT — ENCOUNTER SYMPTOMS: CONSTITUTIONAL NEGATIVE: 1

## 2024-04-04 NOTE — GROUP NOTE
Group Topic: Goals   Group Date: 4/4/2024  Start Time:  9:00 AM  End Time: 10:00 AM  Facilitators: GAB Pham   Department:  [facility]        Name: Hussain Villafana YOB: 1990   MR: [id number]      This was a video IOP group on a HIPAA compliant platform. All patients were provided with informed consent.   Date: 04/04/2024, Time: 9-10a, Number of Patients: 11, User Name: SADIQ Guerin, ATR, Number of Staff: 2  Group topic(s): SMART goal setting group today. Group members identified achievement goals, enjoyment goals, and social connection goals - as well as anticipated obstacles and coping skills. After taking some time to write out their goals, obstacles, and coping skills, each group member shared what they had written. Prior to setting goals, each group member spent some time using a feelings wheel to reflect on how the felt that morning. Kirby set goals to work on a new business project, call some AA friends, go to a CODA meeting, read, and write an opinion piece. He did not mention a specific obstacle, but plans to use mindfulness and exercise to help cope.     Primary Facilitator: DALI Schmidt  Supervised by SADIQ Ernst, ATR     Secondary Facilitator: GAB Reed
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
## 2024-04-04 NOTE — GROUP NOTE
Group Topic: Personal Responsibility   Group Date: 4/4/2024  Start Time: 11:00 AM  End Time: 12:00 PM  Facilitators: GAB Pham   Department: [facility]    This was a video IOP group on a HIPAA compliant program. All patients were provided with informed consent.     Date: 4/4/2024, Time: 11a-12p, Number of Patients: 10, Username: [email or web address], Number of Staff: 2  Group Topic(s): symptom identification. The group explored the definition of symptoms and how symptoms can present themselves physically, mentally, and emotionally. The group worked through brainstorming mild, moderate, and severe symptoms of anxiety and felicitas.      Name: Hussain Villafana YOB: 1990   MR: [id number]      Kirby was on topic and attentive. Patient was engaged and identified examples of manic symptoms including delusions of grandiosity and feeling highly irritable.   GAB Reed    Secondary facilitator: DALI Schmidt  CT supervised by GAB Fajardo-S, ATR

## 2024-04-04 NOTE — GROUP NOTE
Group Topic: Personal Responsibility   Group Date: 4/4/2024  Start Time: 10:00 AM  End Time: 11:00 AM  Facilitators: GAB Pham   Department: [facility]    This was a video IOP group on a HIPAA compliant program. All patients were provided with informed consent.     Date: 4/4/2024, Time: 10-11a, Number of Patients: 12, Username: [email or web address], Number of Staff: 2  Group Topic(s): symptom identification. The group explored the definition of symptoms and how symptoms can present themselves physically, mentally, and emotionally. The group worked through brainstorming mild, moderate, and severe depressive symptoms they experienced. Anxiety and manic symptoms will be discussed in later group.      Name: Hussain Villafana YOB: 1990   MR: [id number]      Kirby was attentive and on topic. Patient appeared to follow along fully and maintained nonverbal engagement.   GAB Reed    Secondary facilitator: DALI Schmidt  CT supervised by GAB Fajardo-S, ATR

## 2024-04-05 ENCOUNTER — DOCUMENTATION (OUTPATIENT)
Dept: BEHAVIORAL HEALTH | Facility: CLINIC | Age: 34
End: 2024-04-05
Payer: COMMERCIAL

## 2024-04-05 NOTE — PROGRESS NOTES
INTENSIVE OUTPATIENT PROGRAM MULTIDISCIPLINARY  TREATMENT PLAN & PROGRESS NOTE     Patient: Hussain Villafana : 1990 Age: 33     Student: No  Treatment Team Date: 24 MRN#: [id number]   Attending Physician: Dr. RICKY Red MD  Date of IOP Admission: 24 Ref by: Self   Week: 7                        Admission Scores:   DASS21: 15 MILLER: 15 BDI: 12 PHQ-9: 5 MAST: 20 DAST: 0 MDQ:  Positive      Current Risk Assessment:  Suicidal Risk:         None:   X     Ideation:       Plan:      Intent:      SI Plan with plan contracts for safety:     Hx of harming self:        Homicidal Risk:     None:   X     Ideation:       Plan:      Intent:      HI Plan with means:                                     Hx of harming others:          Global Improvement  Clinical Global Impressions Rating Scale Of Illness:  5  1-No Illness Present   2-Minimal   3-Mild   4-Moderate   5-Marked X   6-Severe     7-Very Severe    Diagnoses & ICD-10 Codes  Primary Diagnosis & Code: Schizoaffective Disorder, Bipolar Type; F25.0     Psychosocial & Environmental Stressors:   1. Recent psychiatric hospitalization   2. Lack of social support   3. Work/career difficulties     Patient's Treatment Goals:                 Date Initiated:           Progress Update:               1.  Attend and actively participate in IOP _4_ days/week for 3 hours per day  2024   Good X     Fair        Poor  Unclear     2.  Attend weekly medication management sessions and maintain compliance of medications  2024      Good X     Fair        Poor  Unclear                                                     3.  Identify symptoms of diagnoses and developing appropriate coping plans  2024   Good X     Fair        Poor  Unclear                                 4.  Decreasing irrational thoughts and reintegrating into daily life  2024  Good X     Fair        Poor  Unclear        Current medications:  See EMR chart       Progress note:  __New to the IOP  program, attending as planned  __Compliant, Progressing & Improving - Needs more time in IOP therapy program   _X_Compliant, Progressing & Improving Planning Discharge - DISCHARGED   __Compliant, Not Progressing or Improving: Reason  __Non-Compliant, not progressing or improving: Plan  __Other:     Insurance & Benefits: [place] Medicaid, IN NETWORK, BENEFITS ARE BASED ON MEDICAL NECESSITY ONLY: Unlimited visits, covers 100% of the contracted rate after deductible has been met.     Co-Pay per day: $0    DEDUCTIBLE        Single: / Family: / Accumulation:  Single: / Family: /  CO- INSURANCE  Single: / Family: / Accumulation:  Single: /  Family: /    OUT OF POCKET  Single: / Family: / Accumulation:  Single: /  Family: /     Total IOP Sessions completed & authorized to date:  23    Discharge plans have been discussed with patient & medication management provider on: 4/4/24  Reason for discharge:    _X__Successfully completed IOP treatment:   ___Pt. decided to seek treatment elsewhere:   ___Dropped out or no show more than three times:  ___Benefits exhausted:   ___Other:    Discharge date: 4/4/2024                    Discharge Prognosis: Fair   Follow up appointment with: Physician: Herman VALDEZ   Follow up appointment with: Therapist:  Jaron Baldwin   Follow up Aftercare group?  Yes   Patient provided with Crisis Hotline phone number for suicide prevention? Yes     Discharge Scores: Completed    Emergency Treatment Plan Completed by patient: Yes          DASS21: 2  MILLER: 3  BDI: 7  Treatment plan electronically signed by Treatment Team:   GABRIEL Castorena, GAB-S, [facility], PABLO Plasencia, GAB, GABRIEL Nicholson, PsMickey, GABRIEL Gaytan, APRN-CNP

## 2024-04-08 ENCOUNTER — APPOINTMENT (OUTPATIENT)
Dept: BEHAVIORAL HEALTH | Facility: CLINIC | Age: 34
End: 2024-04-08
Payer: COMMERCIAL

## 2024-04-09 ENCOUNTER — APPOINTMENT (OUTPATIENT)
Dept: BEHAVIORAL HEALTH | Facility: CLINIC | Age: 34
End: 2024-04-09
Payer: COMMERCIAL

## 2024-04-10 ENCOUNTER — APPOINTMENT (OUTPATIENT)
Dept: BEHAVIORAL HEALTH | Facility: CLINIC | Age: 34
End: 2024-04-10
Payer: COMMERCIAL

## 2024-04-11 ENCOUNTER — APPOINTMENT (OUTPATIENT)
Dept: BEHAVIORAL HEALTH | Facility: CLINIC | Age: 34
End: 2024-04-11
Payer: COMMERCIAL

## 2024-04-12 ENCOUNTER — TELEPHONE (OUTPATIENT)
Dept: PRIMARY CARE | Facility: CLINIC | Age: 34
End: 2024-04-12
Payer: COMMERCIAL

## 2024-04-12 DIAGNOSIS — R68.82 LOW LIBIDO: Primary | ICD-10-CM

## 2024-04-12 NOTE — TELEPHONE ENCOUNTER
"Called patient due to concern for ongoing symptoms.    He sent patient message on 4/11 for the following: \"low libido for at least 8 weeks which my psychiatrist knows about and we reduced my medication. I've had bad insomnia for several weeks. I'm more sensitive to temperature particularly getting cold really easily. I've had a few bouts of being more emotional. The nausea hasn't gone away from the stool issue (or maybe went away and returned?) with a few incidents of throwing up. My bowel movements are normal and regular now, yet I'm not back to exercising regularly.\"     Patient states that he feels weird, and it is hard to figure out what is going on. Yesterday when he messaged, felt pretty bad. Today some symptoms have improved and some have gotten worse.    Sleep and nausea improved a little bit. Not noticing temperature sensitivity as much.    Really concerned about libido. Ejaculated 3 times in the last 2 months.  Worried he is growing breasts and losing hair.  Wants to look for a job but this is preventing it.    Has felt transgender for 3 years, stronger feelings in last 6 months.    Still prefers to be called Kirby, he/him.    Concerned for hormonal imbalance.    I discussed that the low libido is more likely due to gender dysphoria than hormone imbalance. I did let him know we can check hormone levels more for reassurance. I recommended he continue to discuss gender with his therapist.      "

## 2024-04-13 ENCOUNTER — LAB (OUTPATIENT)
Dept: LAB | Facility: LAB | Age: 34
End: 2024-04-13
Payer: COMMERCIAL

## 2024-04-13 DIAGNOSIS — R68.82 LOW LIBIDO: ICD-10-CM

## 2024-04-13 LAB
ANION GAP SERPL CALC-SCNC: 14 MMOL/L (ref 10–20)
BUN SERPL-MCNC: 12 MG/DL (ref 6–23)
CALCIUM SERPL-MCNC: 10 MG/DL (ref 8.6–10.3)
CHLORIDE SERPL-SCNC: 104 MMOL/L (ref 98–107)
CO2 SERPL-SCNC: 25 MMOL/L (ref 21–32)
CREAT SERPL-MCNC: 1.08 MG/DL (ref 0.5–1.3)
EGFRCR SERPLBLD CKD-EPI 2021: >90 ML/MIN/1.73M*2
ESTRADIOL SERPL-MCNC: 23 PG/ML
GLUCOSE SERPL-MCNC: 77 MG/DL (ref 74–99)
POTASSIUM SERPL-SCNC: 5 MMOL/L (ref 3.5–5.3)
PROLACTIN SERPL-MCNC: 19.7 UG/L (ref 2–18)
SODIUM SERPL-SCNC: 138 MMOL/L (ref 136–145)
TSH SERPL-ACNC: 1.16 MIU/L (ref 0.44–3.98)

## 2024-04-13 PROCEDURE — 84146 ASSAY OF PROLACTIN: CPT

## 2024-04-13 PROCEDURE — 84402 ASSAY OF FREE TESTOSTERONE: CPT

## 2024-04-13 PROCEDURE — 36415 COLL VENOUS BLD VENIPUNCTURE: CPT

## 2024-04-13 PROCEDURE — 84443 ASSAY THYROID STIM HORMONE: CPT

## 2024-04-13 PROCEDURE — 80048 BASIC METABOLIC PNL TOTAL CA: CPT

## 2024-04-13 PROCEDURE — 82670 ASSAY OF TOTAL ESTRADIOL: CPT

## 2024-04-19 LAB
TESTOSTERONE FREE (CHAN): 68.3 PG/ML (ref 35–155)
TESTOSTERONE,TOTAL,LC-MS/MS: 632 NG/DL (ref 250–1100)

## 2024-04-24 ENCOUNTER — APPOINTMENT (OUTPATIENT)
Dept: BEHAVIORAL HEALTH | Facility: CLINIC | Age: 34
End: 2024-04-24
Payer: COMMERCIAL

## 2024-04-24 DIAGNOSIS — F25.0 SCHIZOAFFECTIVE DISORDER, BIPOLAR TYPE (MULTI): ICD-10-CM

## 2024-04-24 DIAGNOSIS — F43.10 PTSD (POST-TRAUMATIC STRESS DISORDER): ICD-10-CM

## 2024-04-24 RX ORDER — TOPIRAMATE 200 MG/1
200 TABLET ORAL DAILY
Qty: 90 TABLET | Refills: 0 | Status: SHIPPED | OUTPATIENT
Start: 2024-04-24 | End: 2024-07-23

## 2024-04-24 RX ORDER — RISPERIDONE 2 MG/1
2 TABLET ORAL 2 TIMES DAILY
Qty: 180 TABLET | Refills: 0 | Status: SHIPPED | OUTPATIENT
Start: 2024-04-24 | End: 2024-07-23

## 2024-06-04 ENCOUNTER — DOCUMENTATION (OUTPATIENT)
Dept: BEHAVIORAL HEALTH | Facility: CLINIC | Age: 34
End: 2024-06-04
Payer: COMMERCIAL

## 2024-06-04 NOTE — PROGRESS NOTES
Patient reached out to provider and left message requesting information on individual therapy specifically requesting two unavailable providers. Provider left VM for patient to request additional information and provider appropriate referrals.   Heena Plasencia, [facility]

## 2024-06-13 ENCOUNTER — APPOINTMENT (OUTPATIENT)
Dept: CARDIOLOGY | Facility: HOSPITAL | Age: 34
End: 2024-06-13
Payer: COMMERCIAL

## 2024-06-13 ENCOUNTER — HOSPITAL ENCOUNTER (EMERGENCY)
Facility: HOSPITAL | Age: 34
Discharge: OTHER INSTITUTION | End: 2024-06-14
Attending: EMERGENCY MEDICINE
Payer: COMMERCIAL

## 2024-06-13 DIAGNOSIS — F25.0 SCHIZOAFFECTIVE DISORDER, BIPOLAR TYPE (MULTI): Primary | ICD-10-CM

## 2024-06-13 LAB
ALBUMIN SERPL BCP-MCNC: 4.7 G/DL (ref 3.4–5)
ALP SERPL-CCNC: 105 U/L (ref 33–120)
ALT SERPL W P-5'-P-CCNC: 21 U/L (ref 10–52)
AMPHETAMINES UR QL SCN: NORMAL
ANION GAP SERPL CALC-SCNC: 14 MMOL/L (ref 10–20)
APPEARANCE UR: CLEAR
APTT PPP: 27 SECONDS (ref 27–38)
AST SERPL W P-5'-P-CCNC: 18 U/L (ref 9–39)
BARBITURATES UR QL SCN: NORMAL
BASOPHILS # BLD AUTO: 0.03 X10*3/UL (ref 0–0.1)
BASOPHILS NFR BLD AUTO: 0.3 %
BENZODIAZ UR QL SCN: NORMAL
BILIRUB SERPL-MCNC: 0.6 MG/DL (ref 0–1.2)
BILIRUB UR STRIP.AUTO-MCNC: NEGATIVE MG/DL
BUN SERPL-MCNC: 5 MG/DL (ref 6–23)
BZE UR QL SCN: NORMAL
CALCIUM SERPL-MCNC: 9.6 MG/DL (ref 8.6–10.3)
CANNABINOIDS UR QL SCN: NORMAL
CARDIAC TROPONIN I PNL SERPL HS: <3 NG/L (ref 0–20)
CHLORIDE SERPL-SCNC: 99 MMOL/L (ref 98–107)
CO2 SERPL-SCNC: 23 MMOL/L (ref 21–32)
COLOR UR: COLORLESS
CREAT SERPL-MCNC: 0.82 MG/DL (ref 0.5–1.3)
EGFRCR SERPLBLD CKD-EPI 2021: >90 ML/MIN/1.73M*2
EOSINOPHIL # BLD AUTO: 0.08 X10*3/UL (ref 0–0.7)
EOSINOPHIL NFR BLD AUTO: 0.9 %
ERYTHROCYTE [DISTWIDTH] IN BLOOD BY AUTOMATED COUNT: 12.6 % (ref 11.5–14.5)
ETHANOL SERPL-MCNC: <10 MG/DL
FENTANYL+NORFENTANYL UR QL SCN: NORMAL
GLUCOSE SERPL-MCNC: 92 MG/DL (ref 74–99)
GLUCOSE UR STRIP.AUTO-MCNC: NORMAL MG/DL
HCT VFR BLD AUTO: 40.8 % (ref 41–52)
HGB BLD-MCNC: 14.6 G/DL (ref 13.5–17.5)
IMM GRANULOCYTES # BLD AUTO: 0.03 X10*3/UL (ref 0–0.7)
IMM GRANULOCYTES NFR BLD AUTO: 0.3 % (ref 0–0.9)
INR PPP: 1 (ref 0.9–1.1)
KETONES UR STRIP.AUTO-MCNC: NEGATIVE MG/DL
LEUKOCYTE ESTERASE UR QL STRIP.AUTO: NEGATIVE
LYMPHOCYTES # BLD AUTO: 1.96 X10*3/UL (ref 1.2–4.8)
LYMPHOCYTES NFR BLD AUTO: 21.3 %
MCH RBC QN AUTO: 31.3 PG (ref 26–34)
MCHC RBC AUTO-ENTMCNC: 35.8 G/DL (ref 32–36)
MCV RBC AUTO: 88 FL (ref 80–100)
METHADONE UR QL SCN: NORMAL
MONOCYTES # BLD AUTO: 0.74 X10*3/UL (ref 0.1–1)
MONOCYTES NFR BLD AUTO: 8 %
NEUTROPHILS # BLD AUTO: 6.37 X10*3/UL (ref 1.2–7.7)
NEUTROPHILS NFR BLD AUTO: 69.2 %
NITRITE UR QL STRIP.AUTO: NEGATIVE
NRBC BLD-RTO: 0 /100 WBCS (ref 0–0)
OPIATES UR QL SCN: NORMAL
OXYCODONE+OXYMORPHONE UR QL SCN: NORMAL
PCP UR QL SCN: NORMAL
PH UR STRIP.AUTO: 7 [PH]
PLATELET # BLD AUTO: 194 X10*3/UL (ref 150–450)
POTASSIUM SERPL-SCNC: 3.4 MMOL/L (ref 3.5–5.3)
PROT SERPL-MCNC: 7.5 G/DL (ref 6.4–8.2)
PROT UR STRIP.AUTO-MCNC: NEGATIVE MG/DL
PROTHROMBIN TIME: 11.2 SECONDS (ref 9.8–12.8)
RBC # BLD AUTO: 4.66 X10*6/UL (ref 4.5–5.9)
RBC # UR STRIP.AUTO: NEGATIVE /UL
SODIUM SERPL-SCNC: 133 MMOL/L (ref 136–145)
SP GR UR STRIP.AUTO: 1
UROBILINOGEN UR STRIP.AUTO-MCNC: NORMAL MG/DL
WBC # BLD AUTO: 9.2 X10*3/UL (ref 4.4–11.3)

## 2024-06-13 PROCEDURE — 80307 DRUG TEST PRSMV CHEM ANLYZR: CPT | Performed by: EMERGENCY MEDICINE

## 2024-06-13 PROCEDURE — 84075 ASSAY ALKALINE PHOSPHATASE: CPT | Performed by: EMERGENCY MEDICINE

## 2024-06-13 PROCEDURE — 81003 URINALYSIS AUTO W/O SCOPE: CPT | Mod: 59 | Performed by: EMERGENCY MEDICINE

## 2024-06-13 PROCEDURE — 36415 COLL VENOUS BLD VENIPUNCTURE: CPT | Performed by: EMERGENCY MEDICINE

## 2024-06-13 PROCEDURE — 93005 ELECTROCARDIOGRAM TRACING: CPT

## 2024-06-13 PROCEDURE — 85730 THROMBOPLASTIN TIME PARTIAL: CPT | Performed by: EMERGENCY MEDICINE

## 2024-06-13 PROCEDURE — 85610 PROTHROMBIN TIME: CPT | Performed by: EMERGENCY MEDICINE

## 2024-06-13 PROCEDURE — 84484 ASSAY OF TROPONIN QUANT: CPT | Performed by: EMERGENCY MEDICINE

## 2024-06-13 PROCEDURE — 82077 ASSAY SPEC XCP UR&BREATH IA: CPT | Performed by: EMERGENCY MEDICINE

## 2024-06-13 PROCEDURE — 99285 EMERGENCY DEPT VISIT HI MDM: CPT | Mod: 25

## 2024-06-13 PROCEDURE — 85025 COMPLETE CBC W/AUTO DIFF WBC: CPT | Performed by: EMERGENCY MEDICINE

## 2024-06-13 SDOH — HEALTH STABILITY: MENTAL HEALTH: ARE YOU HAVING THOUGHTS OF KILLING YOURSELF RIGHT NOW?: NO

## 2024-06-13 SDOH — HEALTH STABILITY: MENTAL HEALTH: IN THE PAST FEW WEEKS, HAVE YOU FELT THAT YOU OR YOUR FAMILY WOULD BE BETTER OFF IF YOU WERE DEAD?: NO

## 2024-06-13 SDOH — HEALTH STABILITY: MENTAL HEALTH: CONTENT: UNREMARKABLE

## 2024-06-13 SDOH — ECONOMIC STABILITY: HOUSING INSECURITY: IN THE LAST 12 MONTHS, HOW MANY PLACES HAVE YOU LIVED?: 2

## 2024-06-13 SDOH — HEALTH STABILITY: MENTAL HEALTH: SUICIDE ASSESSMENT: ADULT (C-SSRS)

## 2024-06-13 SDOH — HEALTH STABILITY: MENTAL HEALTH: HAVE YOU EVER TRIED TO KILL YOURSELF?: NO

## 2024-06-13 SDOH — ECONOMIC STABILITY: HOUSING INSECURITY
IN THE LAST 12 MONTHS, WAS THERE A TIME WHEN YOU DID NOT HAVE A STEADY PLACE TO SLEEP OR SLEPT IN A SHELTER (INCLUDING NOW)?: YES

## 2024-06-13 SDOH — HEALTH STABILITY: MENTAL HEALTH: BEHAVIORS/MOOD: CALM

## 2024-06-13 SDOH — HEALTH STABILITY: MENTAL HEALTH: SUICIDAL BEHAVIOR (LIFETIME): NO

## 2024-06-13 SDOH — HEALTH STABILITY: MENTAL HEALTH: ANXIETY SYMPTOMS: UNEXPLAINED FEARS

## 2024-06-13 SDOH — HEALTH STABILITY: MENTAL HEALTH: NEEDS EXPRESSED: FINANCIAL

## 2024-06-13 SDOH — HEALTH STABILITY: MENTAL HEALTH: IN THE PAST FEW WEEKS, HAVE YOU WISHED YOU WERE DEAD?: NO

## 2024-06-13 SDOH — ECONOMIC STABILITY: INCOME INSECURITY: IN THE LAST 12 MONTHS, WAS THERE A TIME WHEN YOU WERE NOT ABLE TO PAY THE MORTGAGE OR RENT ON TIME?: PATIENT DECLINED

## 2024-06-13 SDOH — HEALTH STABILITY: MENTAL HEALTH: IN THE PAST WEEK, HAVE YOU BEEN HAVING THOUGHTS ABOUT KILLING YOURSELF?: NO

## 2024-06-13 SDOH — HEALTH STABILITY: MENTAL HEALTH: WISH TO BE DEAD (PAST 1 MONTH): NO

## 2024-06-13 SDOH — ECONOMIC STABILITY: GENERAL: FINANCIAL CONCERNS: UNABLE TO MEET BASIC NEEDS

## 2024-06-13 SDOH — SOCIAL STABILITY: SOCIAL NETWORK: EMOTIONAL SUPPORT GIVEN: REASSURE

## 2024-06-13 SDOH — ECONOMIC STABILITY: HOUSING INSECURITY: FEELS SAFE LIVING IN HOME: NO

## 2024-06-13 SDOH — HEALTH STABILITY: MENTAL HEALTH: SUICIDAL BEHAVIOR (3 MONTHS): NO

## 2024-06-13 SDOH — HEALTH STABILITY: MENTAL HEALTH: NON-SPECIFIC ACTIVE SUICIDAL THOUGHTS (PAST 1 MONTH): NO

## 2024-06-13 SDOH — HEALTH STABILITY: MENTAL HEALTH: DEPRESSION SYMPTOMS: NO PROBLEMS REPORTED OR OBSERVED.

## 2024-06-13 ASSESSMENT — LIFESTYLE VARIABLES
SUBSTANCE_ABUSE_PAST_12_MONTHS: NO
PRESCIPTION_ABUSE_PAST_12_MONTHS: NO

## 2024-06-13 ASSESSMENT — COLUMBIA-SUICIDE SEVERITY RATING SCALE - C-SSRS
6. HAVE YOU EVER DONE ANYTHING, STARTED TO DO ANYTHING, OR PREPARED TO DO ANYTHING TO END YOUR LIFE?: YES
1. IN THE PAST MONTH, HAVE YOU WISHED YOU WERE DEAD OR WISHED YOU COULD GO TO SLEEP AND NOT WAKE UP?: NO
2. HAVE YOU ACTUALLY HAD ANY THOUGHTS OF KILLING YOURSELF?: NO
1. SINCE LAST CONTACT, HAVE YOU WISHED YOU WERE DEAD OR WISHED YOU COULD GO TO SLEEP AND NOT WAKE UP?: NO
2. HAVE YOU ACTUALLY HAD ANY THOUGHTS OF KILLING YOURSELF?: NO
6. HAVE YOU EVER DONE ANYTHING, STARTED TO DO ANYTHING, OR PREPARED TO DO ANYTHING TO END YOUR LIFE?: NO
6. HAVE YOU EVER DONE ANYTHING, STARTED TO DO ANYTHING, OR PREPARED TO DO ANYTHING TO END YOUR LIFE?: NO

## 2024-06-13 ASSESSMENT — PAIN SCALES - GENERAL
PAINLEVEL_OUTOF10: 0 - NO PAIN
PAINLEVEL_OUTOF10: 0 - NO PAIN

## 2024-06-13 ASSESSMENT — PAIN - FUNCTIONAL ASSESSMENT: PAIN_FUNCTIONAL_ASSESSMENT: 0-10
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
## 2024-06-13 NOTE — ED NOTES
Pt phone, wallet, keys, and watch given to police.  Pt tank top, pants, shoes, socks, belt, cigarettes and lighter placed in pt belongings bag and placed in locker number 4.      Sienna Daley RN  06/13/24 2349     DVT PPX: TEDs SCDs; hold given ecchymoses on right radial access and also b/c pending staged PCI.   DASH Diabetic diet

## 2024-06-14 VITALS
RESPIRATION RATE: 18 BRPM | HEIGHT: 72 IN | BODY MASS INDEX: 24.38 KG/M2 | WEIGHT: 180 LBS | DIASTOLIC BLOOD PRESSURE: 70 MMHG | TEMPERATURE: 97.9 F | OXYGEN SATURATION: 98 % | HEART RATE: 71 BPM | SYSTOLIC BLOOD PRESSURE: 110 MMHG

## 2024-06-14 PROCEDURE — 2500000002 HC RX 250 W HCPCS SELF ADMINISTERED DRUGS (ALT 637 FOR MEDICARE OP, ALT 636 FOR OP/ED): Performed by: EMERGENCY MEDICINE

## 2024-06-14 PROCEDURE — 36415 COLL VENOUS BLD VENIPUNCTURE: CPT | Performed by: EMERGENCY MEDICINE

## 2024-06-14 RX ORDER — POTASSIUM CHLORIDE 20 MEQ/1
40 TABLET, EXTENDED RELEASE ORAL ONCE
Status: COMPLETED | OUTPATIENT
Start: 2024-06-14 | End: 2024-06-14

## 2024-06-14 ASSESSMENT — PAIN SCALES - GENERAL
PAINLEVEL_OUTOF10: 0 - NO PAIN
PAINLEVEL_OUTOF10: 0 - NO PAIN

## 2024-06-14 NOTE — PROGRESS NOTES
Emergency Medicine Transition of Care Note.    I received Hussain RAMIREZ Broderick in signout from Dr. Costa.  Please see the previous ED provider note for all HPI, PE and MDM up to the time of signout. This is in addition to the primary record.    In brief Hussain Villafana is an 33 y.o. male presenting for   Chief Complaint   Patient presents with    Psychiatric Evaluation     At the time of signout we were awaiting: Placement    Diagnoses as of 06/14/24 0246   Schizoaffective disorder, bipolar type (Multi)       Medical Decision Making  Patient accepted by [facility].    Final diagnoses:   [F25.0] Schizoaffective disorder, bipolar type (Multi)           Procedure  Procedures    Kaiden Villafana MD

## 2024-06-14 NOTE — SIGNIFICANT EVENT
Application for Emergency Admission      Ready for Transfer?  Is the patient medically cleared for transfer to inpatient psychiatry: Yes  Has the patient been accepted to an inpatient psychiatric hospital: Yes    Application for Emergency Admission  IN ACCORDANCE WITH [place]122[place]10 [place]  The Chief Clinical Officer of: [place] 6/14/2024 .2:42 AM    Reason for Hospitalization  The undersigned has reason to believe that: Hussain Villafana Is a mentally ill person subject to hospitalization by court order under division B Section 5122.01 of the Revised Code, i.e., this person:    1.Yes  Represents a substantial risk of physical harm to self as manifested by evidence of threats of, or attempts at, suicide or serious self-inflicted bodily harm    2.No Represents a substantial risk of physical harm to others as manifested by evidence of recent homicidal or other violent behavior, evidence of recent threats that place another in reasonable fear of violent behavior and serious physical harm, or other evidence of present dangerousness    3.No Represents a substantial and immediate risk of serious physical impairment or injury to self as manifested by  evidence that the person is unable to provide for and is not providing for the person's basic physical needs because of the person's mental illness and that appropriate provision for those needs cannot be made  immediately available in the community    4.Yes Would benefit from treatment in a hospital for his mental illness and is in need of such treatment as manifested by evidence of behavior that creates a grave and imminent risk to substantial rights of others or  himself.    5.No Would benefit from treatment as manifested by evidence of behavior that indicates all of the following:       (a) The person is unlikely to survive safely in the community without supervision, based on a clinical determination.       (b) The person has a history of lack of compliance  with treatment for mental illness and one of the following applies:      (i) At least twice within the thirty-six months prior to the filing of an affidavit seeking court-ordered treatment of the person under section 5122.111 of the Revised Code, the lack of compliance has been a significant factor in necessitating hospitalization in a hospital or receipt of services in a forensic or other mental health unit of a correctional facility, provided that the thirty-six-month period shall be extended by the length of any hospitalization or incarceration of the person that occurred within the thirty-six-month period.      (ii) Within the forty-eight months prior to the filing of an affidavit seeking court-ordered treatment of the person under section 5122.111 of the Revised Code, the lack of compliance resulted in one or more acts of serious violent behavior toward self or others or threats of, or attempts at, serious physical harm to self or others, provided that the forty-eight-month period shall be extended by the length of any hospitalization or incarceration of the person that occurred within the forty-eight-month period.      (c) The person, as a result of mental illness, is unlikely to voluntarily participate in necessary treatment.       (d) In view of the person's treatment history and current behavior, the person is in need of treatment in order to prevent a relapse or deterioration that would be likely to result in substantial risk of serious harm to the person or others.    (e) Represents a substantial risk of physical harm to self or others if allowed to remain at liberty pending examination.    Therefore, it is requested that said person be admitted to the above named facility.    STATEMENT OF BELIEF    Must be filled out by one of the following: a psychiatrist, licensed physician, licensed clinical psychologist, health or ,  or .  (Statement shall include the circumstances  under which the individual was taken into custody and the reason for the person's belief that hospitalization is necessary. The statement shall also include a reference to efforts made to secure the individual's property at his residence if he was taken into custody there. Every reasonable and appropriate effort should be made to take this person into custody in the least conspicuous manner possible.)    Unclear if the patient is experiencing new psychosis on top of pre-existing psychiatric disorder.  He has prior history of schizoaffective bipolar type.  He believes that his home is being for close given that his parents are paying for it.    Kaiden Villafana MD 6/14/2024     _____________________________________________________________   Place of Employment: [facility]    STATEMENT OF OBSERVATION BY PSYCHIATRIST, LICENSED PHYSICIAN, OR LICENSED CLINICAL PSYCHOLOGIST, IF APPLICABLE    Place of Observation (e.g., [facility] mental health center, general hospital, office, emergency facility)  (If applicable, please complete)    Kaiden Villafana MD 6/14/2024    _____________________________________________________________
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
## 2024-06-14 NOTE — ED PROVIDER NOTES
HPI   Chief Complaint   Patient presents with    Psychiatric Evaluation       33-year-old man with past medical history of schizo affective disorder, depression, PTSD who does present with complaint of worsening psychosis and loss of home and apparently living in car.  He is says he has been driving around.  Unable to provide appropriate history.  No SI or HI or out of visual hallucinations however he does describe some questions about what what is reality.  He denies any chest pain or abdominal pain or other infectious symptoms.  No other medical complaints                        [place] Coma Scale Score: 15                     Patient History   Past Medical History:   Diagnosis Date    Abdominal hernia 07/05/2022    Chronic prostatitis 07/12/2019    Continuous LLQ abdominal pain 07/07/2023    Dyspnea on exertion 07/07/2023    Epidermoid cyst of neck 07/05/2022    Generalized anxiety disorder 03/05/2018    Other chest pain 04/24/2020    Atypical chest pain    Personal history of leukemia 04/24/2020    History of lymphoid leukemia     Past Surgical History:   Procedure Laterality Date    OTHER SURGICAL HISTORY  07/16/2018    Central IV With Subcutaneous Leander Mediport Single Lumen    OTHER SURGICAL HISTORY  07/19/2021    Cyst excision    OTHER SURGICAL HISTORY  07/19/2021    Inguinal hernia repair    OTHER SURGICAL HISTORY  07/21/2021    Mass excision     No family history on file.  Social History     Tobacco Use    Smoking status: Former     Types: Cigarettes    Smokeless tobacco: Current    Tobacco comments:     occassionally   Vaping Use    Vaping status: Former    Substances: Nicotine   Substance Use Topics    Alcohol use: Not Currently     Comment: socially    Drug use: Never       Physical Exam   ED Triage Vitals [06/13/24 1742]   Temperature Heart Rate Respirations BP   36.9 °C (98.4 °F) 77 16 125/79      Pulse Ox Temp Source Heart Rate Source Patient Position   98 % Temporal Monitor --      BP Location  FiO2 (%)     -- --       Physical Exam  Vitals and nursing note reviewed.   Constitutional:       Appearance: Normal appearance. He is normal weight.   HENT:      Head: Normocephalic and atraumatic.      Nose: Nose normal.      Mouth/Throat:      Mouth: Mucous membranes are moist.      Pharynx: Oropharynx is clear.   Eyes:      Extraocular Movements: Extraocular movements intact.      Conjunctiva/sclera: Conjunctivae normal.      Pupils: Pupils are equal, round, and reactive to light.   Cardiovascular:      Rate and Rhythm: Normal rate and regular rhythm.      Pulses: Normal pulses.      Heart sounds: Normal heart sounds.   Pulmonary:      Breath sounds: Normal breath sounds.   Abdominal:      General: Abdomen is flat. Bowel sounds are normal. There is no distension.      Palpations: Abdomen is soft.      Tenderness: There is no abdominal tenderness. There is right CVA tenderness. There is no left CVA tenderness, guarding or rebound.   Musculoskeletal:         General: Normal range of motion.      Cervical back: Normal range of motion and neck supple.   Skin:     General: Skin is warm and dry.      Capillary Refill: Capillary refill takes less than 2 seconds.   Neurological:      General: No focal deficit present.      Mental Status: He is alert and oriented to person, place, and time. Mental status is at baseline.      Sensory: No sensory deficit.      Motor: No weakness.   Psychiatric:      Comments: Patient with worsening psychosis.  No SI or HI.  Questionable hallucination         ED Course & MDM   Diagnoses as of 06/14/24 2112   Schizoaffective disorder, bipolar type (Multi)       Medical Decision Making  Patient has EPAT activated.  We did draw labs including CBC, CMP, urine tox and urine drug screen.  His drug screen was negative.  His UA was negative.  There was no significant electrolyte abnormalities.  Slightly low potassium and sodium likely secondary dehydration.  He did not have any elevated blood cell  count or stomach infection but troponin was negative.  Patient can be medically cleared for psychiatric evaluation.  EPAT does evaluate patient feels requires inpatient hospitalization.  Pending placement.    EKG interpreted by me shows Sinus bradycardia at a rate of 58 with no acute ischemic changes.    Amount and/or Complexity of Data Reviewed  Labs: ordered. Decision-making details documented in ED Course.  ECG/medicine tests: ordered. Decision-making details documented in ED Course.        Procedure  Procedures     Cristobal Costa MD  06/13/24 2113       Cristobal Costa MD  06/13/24 2300       Cristobal Costa MD  06/14/24 2117

## 2024-06-14 NOTE — ED NOTES
Ambulance service arrived to transport patient. VSS upon transfer. Items removed from locker #4 and property returned from police. Patient left unit calm and in stable condition.      Sarai Garcia RN  06/14/24 0750

## 2024-06-15 LAB
ATRIAL RATE: 58 BPM
P AXIS: 0 DEGREES
P OFFSET: 192 MS
P ONSET: 135 MS
PR INTERVAL: 154 MS
Q ONSET: 212 MS
QRS COUNT: 9 BEATS
QRS DURATION: 108 MS
QT INTERVAL: 396 MS
QTC CALCULATION(BAZETT): 388 MS
QTC FREDERICIA: 391 MS
R AXIS: 52 DEGREES
T AXIS: 30 DEGREES
T OFFSET: 410 MS
VENTRICULAR RATE: 58 BPM

## 2024-08-20 ENCOUNTER — APPOINTMENT (OUTPATIENT)
Dept: BEHAVIORAL HEALTH | Facility: CLINIC | Age: 34
End: 2024-08-20
Payer: COMMERCIAL

## 2024-09-17 ENCOUNTER — LAB REQUISITION (OUTPATIENT)
Dept: LAB | Facility: HOSPITAL | Age: 34
End: 2024-09-17
Payer: COMMERCIAL

## 2024-09-17 DIAGNOSIS — Z79.899 OTHER LONG TERM (CURRENT) DRUG THERAPY: ICD-10-CM

## 2024-09-17 PROCEDURE — 36415 COLL VENOUS BLD VENIPUNCTURE: CPT

## 2024-09-17 PROCEDURE — 80173 ASSAY OF HALOPERIDOL: CPT

## 2024-09-21 LAB — HALOPERIDOL SERPL-MCNC: 2.2 NG/ML (ref 5–20)

## 2024-10-08 ENCOUNTER — LAB REQUISITION (OUTPATIENT)
Dept: LAB | Facility: HOSPITAL | Age: 34
End: 2024-10-08
Payer: COMMERCIAL

## 2024-10-08 DIAGNOSIS — Z79.899 OTHER LONG TERM (CURRENT) DRUG THERAPY: ICD-10-CM

## 2024-10-08 DIAGNOSIS — F63.9 IMPULSE DISORDER, UNSPECIFIED: ICD-10-CM

## 2024-10-08 LAB
ANION GAP SERPL CALC-SCNC: 12 MMOL/L (ref 10–20)
BUN SERPL-MCNC: 8 MG/DL (ref 6–23)
CALCIUM SERPL-MCNC: 9.6 MG/DL (ref 8.6–10.3)
CHLORIDE SERPL-SCNC: 103 MMOL/L (ref 98–107)
CO2 SERPL-SCNC: 22 MMOL/L (ref 21–32)
CREAT SERPL-MCNC: 0.8 MG/DL (ref 0.5–1.3)
EGFRCR SERPLBLD CKD-EPI 2021: >90 ML/MIN/1.73M*2
GLUCOSE SERPL-MCNC: 73 MG/DL (ref 74–99)
POTASSIUM SERPL-SCNC: 4.4 MMOL/L (ref 3.5–5.3)
SODIUM SERPL-SCNC: 133 MMOL/L (ref 136–145)

## 2024-10-08 PROCEDURE — 80173 ASSAY OF HALOPERIDOL: CPT

## 2024-10-08 PROCEDURE — 80048 BASIC METABOLIC PNL TOTAL CA: CPT

## 2024-10-08 PROCEDURE — 36415 COLL VENOUS BLD VENIPUNCTURE: CPT

## 2024-10-14 LAB — HALOPERIDOL SERPL-MCNC: 7.3 NG/ML (ref 5–20)

## 2024-11-30 DIAGNOSIS — F25.9 SCHIZO AFFECTIVE SCHIZOPHRENIA (MULTI): ICD-10-CM

## 2024-11-30 RX ORDER — MULTIVITAMIN
TABLET ORAL
COMMUNITY
Start: 2024-07-11

## 2024-11-30 RX ORDER — HALOPERIDOL 5 MG/1
TABLET ORAL
COMMUNITY
Start: 2024-11-13

## 2024-11-30 RX ORDER — LISDEXAMFETAMINE DIMESYLATE 20 MG/1
CAPSULE ORAL
COMMUNITY
Start: 2024-11-14 | End: 2024-11-30 | Stop reason: SDUPTHER

## 2024-11-30 RX ORDER — HALOPERIDOL DECANOATE 100 MG/ML
INJECTION INTRAMUSCULAR
COMMUNITY
Start: 2024-11-14

## 2024-11-30 RX ORDER — LISDEXAMFETAMINE DIMESYLATE 20 MG/1
20 CAPSULE ORAL EVERY MORNING
Qty: 30 CAPSULE | Refills: 0 | Status: SHIPPED | OUTPATIENT
Start: 2024-11-30 | End: 2024-12-01 | Stop reason: SDUPTHER

## 2024-11-30 RX ORDER — BENZTROPINE MESYLATE 1 MG/1
TABLET ORAL
COMMUNITY
Start: 2024-11-13

## 2024-11-30 NOTE — TELEPHONE ENCOUNTER
Reviewed OARRS: No concerns  11/14/2024 11/14/2024 1 Vyvanse 20 Mg Capsule 20.00 20 Sa Pet [id number] Pha [phone]  Medicaid [place]

## 2024-12-01 DIAGNOSIS — F25.9 SCHIZO AFFECTIVE SCHIZOPHRENIA (MULTI): ICD-10-CM

## 2024-12-01 RX ORDER — LISDEXAMFETAMINE DIMESYLATE 20 MG/1
20 CAPSULE ORAL EVERY MORNING
Qty: 30 CAPSULE | Refills: 0 | Status: SHIPPED | OUTPATIENT
Start: 2024-12-01

## 2025-05-28 ENCOUNTER — APPOINTMENT (OUTPATIENT)
Dept: PRIMARY CARE | Facility: CLINIC | Age: 35
End: 2025-05-28
Payer: COMMERCIAL

## 2025-05-28 VITALS
WEIGHT: 193.2 LBS | HEIGHT: 72 IN | DIASTOLIC BLOOD PRESSURE: 85 MMHG | BODY MASS INDEX: 26.17 KG/M2 | TEMPERATURE: 97.6 F | OXYGEN SATURATION: 98 % | HEART RATE: 68 BPM | SYSTOLIC BLOOD PRESSURE: 139 MMHG

## 2025-05-28 DIAGNOSIS — R07.89 ATYPICAL CHEST PAIN: ICD-10-CM

## 2025-05-28 DIAGNOSIS — F17.219 CIGARETTE NICOTINE DEPENDENCE WITH NICOTINE-INDUCED DISORDER: ICD-10-CM

## 2025-05-28 DIAGNOSIS — L98.9 SKIN LESION: ICD-10-CM

## 2025-05-28 DIAGNOSIS — Z00.00 LABORATORY EXAMINATION ORDERED AS PART OF A ROUTINE GENERAL MEDICAL EXAMINATION: ICD-10-CM

## 2025-05-28 DIAGNOSIS — Z76.89 ENCOUNTER TO ESTABLISH CARE WITH NEW DOCTOR: ICD-10-CM

## 2025-05-28 DIAGNOSIS — F31.70 BIPOLAR I DISORDER IN REMISSION (MULTI): ICD-10-CM

## 2025-05-28 DIAGNOSIS — C91.01 ACUTE LYMPHOBLASTIC LEUKEMIA (ALL) IN REMISSION (MULTI): ICD-10-CM

## 2025-05-28 DIAGNOSIS — Z00.00 ROUTINE GENERAL MEDICAL EXAMINATION AT A HEALTH CARE FACILITY: ICD-10-CM

## 2025-05-28 DIAGNOSIS — F25.0 SCHIZOAFFECTIVE DISORDER, BIPOLAR TYPE (MULTI): ICD-10-CM

## 2025-05-28 DIAGNOSIS — R91.1 LUNG NODULE: Primary | ICD-10-CM

## 2025-05-28 DIAGNOSIS — Z51.81 MEDICATION MONITORING ENCOUNTER: ICD-10-CM

## 2025-05-28 PROBLEM — F31.9 BIPOLAR I DISORDER (MULTI): Status: ACTIVE | Noted: 2025-05-28

## 2025-05-28 PROCEDURE — 99214 OFFICE O/P EST MOD 30 MIN: CPT | Performed by: STUDENT IN AN ORGANIZED HEALTH CARE EDUCATION/TRAINING PROGRAM

## 2025-05-28 PROCEDURE — 3008F BODY MASS INDEX DOCD: CPT | Performed by: STUDENT IN AN ORGANIZED HEALTH CARE EDUCATION/TRAINING PROGRAM

## 2025-05-28 PROCEDURE — 99395 PREV VISIT EST AGE 18-39: CPT | Performed by: STUDENT IN AN ORGANIZED HEALTH CARE EDUCATION/TRAINING PROGRAM

## 2025-05-28 PROCEDURE — 99406 BEHAV CHNG SMOKING 3-10 MIN: CPT | Performed by: STUDENT IN AN ORGANIZED HEALTH CARE EDUCATION/TRAINING PROGRAM

## 2025-05-28 RX ORDER — TRAZODONE HYDROCHLORIDE 100 MG/1
100 TABLET ORAL AS NEEDED
COMMUNITY
Start: 2025-05-27

## 2025-05-28 ASSESSMENT — ENCOUNTER SYMPTOMS
VOMITING: 0
ABDOMINAL PAIN: 0
COUGH: 0
COLOR CHANGE: 0
DEPRESSION: 1
FATIGUE: 0
DIZZINESS: 0
NAUSEA: 0
ADENOPATHY: 0
CONSTIPATION: 0
DIARRHEA: 0
FEVER: 0
DIFFICULTY URINATING: 0
SHORTNESS OF BREATH: 0
LOSS OF SENSATION IN FEET: 0
OCCASIONAL FEELINGS OF UNSTEADINESS: 0
WHEEZING: 0
HEADACHES: 0
CHILLS: 0

## 2025-05-28 ASSESSMENT — PATIENT HEALTH QUESTIONNAIRE - PHQ9
2. FEELING DOWN, DEPRESSED OR HOPELESS: NOT AT ALL
SUM OF ALL RESPONSES TO PHQ9 QUESTIONS 1 AND 2: 0
1. LITTLE INTEREST OR PLEASURE IN DOING THINGS: NOT AT ALL

## 2025-05-28 NOTE — ASSESSMENT & PLAN NOTE
Stable, continue follow up with [facility] for psychiatric care and medication management. Will get labs to monitor medications.

## 2025-05-28 NOTE — ASSESSMENT & PLAN NOTE
Chronic issue, initial encounter. Will get a CT chest since no CT chest was conducted after first seen in 2023. Pt is a current smoker and has a hx of ALL as a kid, will want repeat imaging to ensure no progression of nodule. No red flag sx today.

## 2025-05-28 NOTE — ASSESSMENT & PLAN NOTE
Chronic issue, uncontrolled, initial encounter. At least 3 mins were spent on smoking cessation counseling. Smoking for 11 years on and off about 1 PPD. We discussed smoking cessation. Patient currently smokes 1 pack per day for 11 years. Barriers to quitting: stress relief, other methods not working. We discussion the importance of quitting for their cardiovascular health and lung health, and how long term damage from cigarette and tobacco use can lead to things such as heart disease, stroke, COPD, and cancer. Techniques discussed include nicotine based medication like patches and gum, Wellbutrin, Chantix, support systems, and sheer strength of will. Patient is is interested in quitting. They have not decided on a quit date, but want to quit smoking soon.

## 2025-05-28 NOTE — ASSESSMENT & PLAN NOTE
Chronic issue, stable, initial encounter. Continue management through psychiatry at [facility].

## 2025-05-28 NOTE — ASSESSMENT & PLAN NOTE
Stable, continue to monitor. Advised to have copy of his records, chemo meds sent to our office, can send a PDF through [employer] if easier.

## 2025-05-28 NOTE — ASSESSMENT & PLAN NOTE
Chronic issue, stable, initial encounter. Has seen cardio in the past, had a stress echo that was normal in 2023. Will get labs and EKG. If abnormal, will place cardio referral.

## 2025-05-28 NOTE — PATIENT INSTRUCTIONS
Please make a follow up appointment in  3 months for your: follow up to go over lab test results    Or follow up with us if any new, changing, or concerning symptoms occur!    If you had lab work ordered today, please complete it at your earliest convenience. There is a cholesterol test ordered with your lab work, please make sure to get it fasting (no snacks after midnight then complete at 8 am the next day. Plain coffee, tea or water is ok! No milk or sugar in your morning beverage!) You do not need to bring in any paperwork to get this blood work done. You can walk-in or make an appointment at any  [facility] lab location. Your list of tests on your summary are for your personal records. You can request a copay estimate from the  on the labs you are ordered.    If you had referrals placed today, you can call the scheduling phone number on your after visit summary to schedule which is: [phone]. Referrals include your preventative health screening tests such as colonoscopy, mammograms, or bone density scans. If you have any questions about billing or fees for this or other office appointments, please be advised that you will need to contact the billing department.    You are due for the following vaccines:  Covid-19 booster (complete at your local pharmacy)    Please schedule follow ups with the following specialist:

## 2025-05-28 NOTE — PROGRESS NOTES
[facility] - Primary Care  [address], [address]  [place] [address]    Subjective     Patient ID: Hussain Villafana is a 34 y.o. male who presents for  Establish Care (Left arm back of upper arm spot. Would like labs)     Patient is establishing care today, this is his first appointment with me.    He is due for labs and his physical.    He is concerned about his cardiovascular issues. He has ongoing issues with chest pain. He had a stress echo in his 20s. His last one was 4 years ago. He was told he needs a stress echo every 5 years. He was following with a cardiologist at .    He is a smoker. He has a left lung nodule. Last year he was homeless, he had a manic episode at the time. He was smoking more at that time, but once he was more stable he was able to cut down.    He has a new mole on his left arm, he spends a lot of time in the sun.    He drank 2 gallons of water a day, he also has been doing liquid iv because his electrolyte were low in the past, his potassium and sodium.    He sees [facility] for behavioral health. They are managing his meds.    Had ALL and is in remission, was on chemo from 7 to 10 years old. Some of the chemo would affect the heart.         Review of Systems   Constitutional:  Negative for chills, fatigue and fever.   HENT:  Negative for congestion.    Eyes:  Negative for visual disturbance.   Respiratory:  Negative for cough, shortness of breath and wheezing.    Cardiovascular:  Negative for chest pain.   Gastrointestinal:  Negative for abdominal pain, constipation, diarrhea, nausea and vomiting.   Genitourinary:  Negative for difficulty urinating.   Musculoskeletal:  Negative for gait problem.   Skin:  Negative for color change.   Neurological:  Negative for dizziness, syncope and headaches.   Hematological:  Negative for adenopathy.       Social History[1]  Family History[2]  RX Allergies[3]   Current Medications[4]    /85 (BP Location: Right arm,  Patient Position: Sitting, BP Cuff Size: Adult)   Pulse 68   Temp 36.4 °C (97.6 °F) (Temporal)   Ht 1.829 m (6')   Wt 87.6 kg (193 lb 3.2 oz)   SpO2 98%   BMI 26.20 kg/m²      Objective   Physical Exam  Vitals reviewed.   Constitutional:       General: He is not in acute distress.     Appearance: Normal appearance. He is not ill-appearing, toxic-appearing or diaphoretic.   HENT:      Head: Normocephalic and atraumatic.      Right Ear: Tympanic membrane, ear canal and external ear normal. There is no impacted cerumen.      Left Ear: Tympanic membrane, ear canal and external ear normal. There is no impacted cerumen.      Nose: Nose normal. No congestion.      Mouth/Throat:      Mouth: Mucous membranes are moist.      Pharynx: Oropharynx is clear. No oropharyngeal exudate or posterior oropharyngeal erythema.   Eyes:      General:         Right eye: No discharge.         Left eye: No discharge.      Extraocular Movements: Extraocular movements intact.      Conjunctiva/sclera: Conjunctivae normal.      Pupils: Pupils are equal, round, and reactive to light.   Neck:      Comments: No thyromegaly  Cardiovascular:      Rate and Rhythm: Normal rate and regular rhythm.      Pulses: Normal pulses.      Heart sounds: Normal heart sounds. No murmur heard.     No friction rub. No gallop.   Pulmonary:      Effort: Pulmonary effort is normal. No respiratory distress.      Breath sounds: Normal breath sounds. No stridor. No wheezing, rhonchi or rales.   Chest:      Chest wall: No tenderness.   Abdominal:      General: Abdomen is flat. Bowel sounds are normal. There is no distension.      Palpations: Abdomen is soft. There is no mass.      Tenderness: There is no abdominal tenderness. There is no guarding or rebound.      Hernia: No hernia is present.   Musculoskeletal:         General: No swelling or tenderness. Normal range of motion.      Cervical back: Normal range of motion and neck supple. No rigidity or tenderness.       "Right lower leg: No edema.      Left lower leg: No edema.   Lymphadenopathy:      Cervical: No cervical adenopathy.   Skin:     General: Skin is warm and dry.      Coloration: Skin is not jaundiced or pale.      Comments: 0.5 cm in diameter brown to pink nevi on the upper left arm, lateral aspect.   Neurological:      General: No focal deficit present.      Mental Status: He is alert and oriented to person, place, and time.      Gait: Gait normal.   Psychiatric:         Mood and Affect: Mood normal.         Behavior: Behavior normal.         Thought Content: Thought content normal.         Judgment: Judgment normal.           Labs:   Lab Results   Component Value Date    WBC 9.2 06/13/2024    HGB 14.6 06/13/2024    HCT 40.8 (L) 06/13/2024     06/13/2024    TSH 1.16 04/13/2024    INR 1.0 06/13/2024    GLUF 88 03/08/2018     Lab Results   Component Value Date     (L) 10/08/2024    K 4.4 10/08/2024     10/08/2024    BUN 8 10/08/2024    CREATININE 0.80 10/08/2024    GLUCOSE 73 (L) 10/08/2024    CALCIUM 9.6 10/08/2024    PROT 7.5 06/13/2024    BILITOT 0.6 06/13/2024    ALKPHOS 105 06/13/2024    AST 18 06/13/2024    ALT 21 06/13/2024     Lab Results   Component Value Date    CHOL 176 01/28/2024    CHOL 188 07/18/2023    CHOL 155 10/24/2020      Lab Results   Component Value Date    TRIG 86 01/28/2024    TRIG 55 07/18/2023    TRIG 100 10/24/2020      Lab Results   Component Value Date    HDL 76.9 01/28/2024    HDL 78.1 07/18/2023    HDL 57.2 10/24/2020     Lab Results   Component Value Date    LDLCALC 82 01/28/2024      Lab Results   Component Value Date    VLDL 17 01/28/2024    VLDL 11 07/18/2023    VLDL 20 10/24/2020    No components found for: \"CHOLHDLRATI0\"    No data recorded    Imaging/Testing: ECG 12 lead  Sinus bradycardia  Nonspecific ST and T wave abnormality  Abnormal ECG  When compared with ECG of 26-JAN-2024 13:15,  Incomplete right bundle branch block is no longer Present  Nonspecific T " wave abnormality now evident in Inferior leads  Nonspecific T wave abnormality now evident in Lateral leads  See ED provider note for full interpretation and clinical correlation  Confirmed by Grace Alegria ([id number]) on 6/15/2024 4:35:54 PM    Assessment/Plan   Problem List Items Addressed This Visit       ALL (acute lymphoblastic leukemia) (Multi)    Stable, continue to monitor. Advised to have copy of his records, chemo meds sent to our office, can send a PDF through [employer] if easier.         Atypical chest pain    Chronic issue, stable, initial encounter. Has seen cardio in the past, had a stress echo that was normal in 2023. Will get labs and EKG. If abnormal, will place cardio referral.          Relevant Orders    CBC and Auto Differential    Comprehensive Metabolic Panel    TSH with reflex to Free T4 if abnormal    Magnesium    ECG 12 lead (Ancillary Performed)    Bipolar I disorder in remission (Multi)    Stable, continue follow up with [facility] for psychiatric care and medication management. Will get labs to monitor medications.         Nicotine dependence    Chronic issue, uncontrolled, initial encounter. At least 3 mins were spent on smoking cessation counseling. Smoking for 11 years on and off about 1 PPD. We discussed smoking cessation. Patient currently smokes 1 pack per day for 11 years. Barriers to quitting: stress relief, other methods not working. We discussion the importance of quitting for their cardiovascular health and lung health, and how long term damage from cigarette and tobacco use can lead to things such as heart disease, stroke, COPD, and cancer. Techniques discussed include nicotine based medication like patches and gum, Wellbutrin, Chantix, support systems, and sheer strength of will. Patient is is interested in quitting. They have not decided on a quit date, but want to quit smoking soon.           Relevant Orders    Referral to Tobacco Cessation Counseling    Lung nodule - Primary     Chronic issue, initial encounter. Will get a CT chest since no CT chest was conducted after first seen in 2023. Pt is a current smoker and has a hx of ALL as a kid, will want repeat imaging to ensure no progression of nodule. No red flag sx today.         Relevant Orders    CT chest wo IV contrast    Schizoaffective disorder, bipolar type (Multi)    Chronic issue, stable, initial encounter. Continue management through psychiatry at [facility].          Other Visit Diagnoses         Medication monitoring encounter        Relevant Orders    Lipid Panel    TSH with reflex to Free T4 if abnormal    Hemoglobin A1C    Prolactin level      Skin lesion        Relevant Orders    Referral to Dermatology      Laboratory examination ordered as part of a routine general medical examination        Relevant Orders    CBC and Auto Differential    Comprehensive Metabolic Panel    Lipid Panel    Hemoglobin A1C      Encounter to establish care with new doctor          Routine general medical examination at a health care facility              Skin Lesion - New issue, initial encounter. Due to new mole, color change, sun damage, will place referral to derm for evaluation and possible bx if appropriate.       As part of today's Preventative Visit, an age and gender-appropriate history and physical was performed, as documented below. Counseling and anticipatory guidance were performed, and risk factor reduction interventions (including [place] Preventative Services Task Force recommended screening tests) were utilized/ordered as outlined in the above Assessment and Plan. All patient medications were reviewed, and refilled if necessary. Patient and I discussed diet/nutrition, lifestyle modifications, safety, medication indications and side effects, and health goals.    current treatment plan is effective, no change in therapy, orders and follow up as documented in EMR, lab results reviewed with patient, repeat labs ordered  prior to next appointment, reviewed compliance with lifestyle measures, reviewed diet, exercise and weight control, reviewed medications and side effects in detail     Return visit in 3 months.         LOUIE TOVAR MD, [facility]  Department of Family Medicine of [facility] - Primary Care       [1]   Social History  Tobacco Use    Smoking status: Every Day     Types: Cigarettes    Smokeless tobacco: Current    Tobacco comments:     occassionally   Vaping Use    Vaping status: Former    Substances: Nicotine   Substance Use Topics    Alcohol use: Not Currently    Drug use: Never   [2]   Family History  Problem Relation Name Age of Onset    Seizures Maternal Grandmother Anabell Lunsford    [3] No Known Allergies  [4]   Current Outpatient Medications   Medication Sig Dispense Refill    benztropine (Cogentin) 1 mg tablet       haloperidol (Haldol) 5 mg tablet       haloperidol decanoate (Haldol Decanoate) 100 mg/mL injection       lisdexamfetamine (Vyvanse) 20 mg capsule Take 1 capsule (20 mg) by mouth once daily in the morning. 30 capsule 0    multivitamin tablet TAKE 1 TAB BY MOUTH ONCE DAILY FOR SUPPLEMENTATION      risperiDONE (RisperDAL) 2 mg tablet TAKE 1 TABLET (2 MG) BY MOUTH 2 TIMES A DAY.  tablet 0    traZODone (Desyrel) 100 mg tablet Take 1 tablet (100 mg) by mouth if needed for sleep. Once a week      topiramate (Topamax) 200 mg tablet Take 1 tablet (200 mg) by mouth once daily. 90 tablet 0     No current facility-administered medications for this visit.

## 2025-06-06 ENCOUNTER — HOSPITAL ENCOUNTER (OUTPATIENT)
Dept: CARDIOLOGY | Facility: HOSPITAL | Age: 35
Discharge: HOME | End: 2025-06-06
Payer: COMMERCIAL

## 2025-06-06 DIAGNOSIS — R07.89 ATYPICAL CHEST PAIN: ICD-10-CM

## 2025-06-06 LAB
ATRIAL RATE: 61 BPM
P AXIS: 25 DEGREES
P OFFSET: 184 MS
P ONSET: 133 MS
PR INTERVAL: 164 MS
Q ONSET: 215 MS
QRS COUNT: 10 BEATS
QRS DURATION: 102 MS
QT INTERVAL: 390 MS
QTC CALCULATION(BAZETT): 392 MS
QTC FREDERICIA: 391 MS
R AXIS: 75 DEGREES
T AXIS: 50 DEGREES
T OFFSET: 410 MS
VENTRICULAR RATE: 61 BPM

## 2025-06-06 PROCEDURE — 93005 ELECTROCARDIOGRAM TRACING: CPT

## 2025-06-07 LAB
ALBUMIN SERPL-MCNC: 5.2 G/DL (ref 3.6–5.1)
ALP SERPL-CCNC: 105 U/L (ref 36–130)
ALT SERPL-CCNC: 28 U/L (ref 9–46)
ANION GAP SERPL CALCULATED.4IONS-SCNC: 9 MMOL/L (CALC) (ref 7–17)
AST SERPL-CCNC: 30 U/L (ref 10–40)
BASOPHILS # BLD AUTO: 8 CELLS/UL (ref 0–200)
BASOPHILS NFR BLD AUTO: 0.2 %
BILIRUB SERPL-MCNC: 0.6 MG/DL (ref 0.2–1.2)
BUN SERPL-MCNC: 8 MG/DL (ref 7–25)
CALCIUM SERPL-MCNC: 10.4 MG/DL (ref 8.6–10.3)
CHLORIDE SERPL-SCNC: 98 MMOL/L (ref 98–110)
CHOLEST SERPL-MCNC: 175 MG/DL
CHOLEST/HDLC SERPL: 2.4 (CALC)
CO2 SERPL-SCNC: 23 MMOL/L (ref 20–32)
CREAT SERPL-MCNC: 1.08 MG/DL (ref 0.6–1.26)
EGFRCR SERPLBLD CKD-EPI 2021: 92 ML/MIN/1.73M2
EOSINOPHIL # BLD AUTO: 71 CELLS/UL (ref 15–500)
EOSINOPHIL NFR BLD AUTO: 1.7 %
ERYTHROCYTE [DISTWIDTH] IN BLOOD BY AUTOMATED COUNT: 12.5 % (ref 11–15)
EST. AVERAGE GLUCOSE BLD GHB EST-MCNC: 103 MG/DL
EST. AVERAGE GLUCOSE BLD GHB EST-SCNC: 5.7 MMOL/L
GLUCOSE SERPL-MCNC: 98 MG/DL (ref 65–99)
HBA1C MFR BLD: 5.2 %
HCT VFR BLD AUTO: 46.2 % (ref 38.5–50)
HDLC SERPL-MCNC: 74 MG/DL
HGB BLD-MCNC: 15.5 G/DL (ref 13.2–17.1)
LDLC SERPL CALC-MCNC: 87 MG/DL (CALC)
LYMPHOCYTES # BLD AUTO: 966 CELLS/UL (ref 850–3900)
LYMPHOCYTES NFR BLD AUTO: 23 %
MAGNESIUM SERPL-MCNC: 2.2 MG/DL (ref 1.5–2.5)
MCH RBC QN AUTO: 31.3 PG (ref 27–33)
MCHC RBC AUTO-ENTMCNC: 33.5 G/DL (ref 32–36)
MCV RBC AUTO: 93.3 FL (ref 80–100)
MONOCYTES # BLD AUTO: 563 CELLS/UL (ref 200–950)
MONOCYTES NFR BLD AUTO: 13.4 %
NEUTROPHILS # BLD AUTO: 2591 CELLS/UL (ref 1500–7800)
NEUTROPHILS NFR BLD AUTO: 61.7 %
NONHDLC SERPL-MCNC: 101 MG/DL (CALC)
PLATELET # BLD AUTO: 223 THOUSAND/UL (ref 140–400)
PMV BLD REES-ECKER: 10.6 FL (ref 7.5–12.5)
POTASSIUM SERPL-SCNC: 4.8 MMOL/L (ref 3.5–5.3)
PROLACTIN SERPL-MCNC: 9.7 NG/ML (ref 2–18)
PROT SERPL-MCNC: 7.8 G/DL (ref 6.1–8.1)
RBC # BLD AUTO: 4.95 MILLION/UL (ref 4.2–5.8)
SODIUM SERPL-SCNC: 130 MMOL/L (ref 135–146)
TRIGL SERPL-MCNC: 58 MG/DL
TSH SERPL-ACNC: 1.49 MIU/L (ref 0.4–4.5)
WBC # BLD AUTO: 4.2 THOUSAND/UL (ref 3.8–10.8)

## 2025-06-11 ENCOUNTER — HOSPITAL ENCOUNTER (OUTPATIENT)
Dept: RADIOLOGY | Facility: HOSPITAL | Age: 35
Discharge: HOME | End: 2025-06-11
Payer: COMMERCIAL

## 2025-06-11 DIAGNOSIS — R91.1 LUNG NODULE: ICD-10-CM

## 2025-06-11 PROCEDURE — 71250 CT THORAX DX C-: CPT

## 2025-06-11 PROCEDURE — 71250 CT THORAX DX C-: CPT | Performed by: RADIOLOGY

## 2025-06-13 DIAGNOSIS — R91.1 LUNG NODULE: Primary | ICD-10-CM

## 2025-06-19 PROBLEM — E87.6 HYPOKALEMIA: Status: RESOLVED | Noted: 2025-06-19 | Resolved: 2025-06-19

## 2025-06-19 RX ORDER — FERROUS ASPARTO GLYCINATE, FERROUS FUMARATE, ASCORBIC ACID, FOLIC ACID, CYANOCOBALAMIN, ZINC, AND INTRINSIC FACTOR 25; 50; 60; 750; 250; 10; 12; 100 MG/1; MG/1; MG/1; UG/1; UG/1; UG/1; MG/1; MG/1
1 CAPSULE ORAL
COMMUNITY
Start: 2025-02-13 | End: 2025-06-24 | Stop reason: ALTCHOICE

## 2025-06-19 RX ORDER — DOCUSATE SODIUM 100 MG
100 CAPSULE ORAL 2 TIMES DAILY PRN
COMMUNITY
Start: 2025-06-06

## 2025-06-24 ENCOUNTER — OFFICE VISIT (OUTPATIENT)
Dept: PRIMARY CARE | Facility: CLINIC | Age: 35
End: 2025-06-24
Payer: COMMERCIAL

## 2025-06-24 VITALS
SYSTOLIC BLOOD PRESSURE: 109 MMHG | OXYGEN SATURATION: 100 % | WEIGHT: 189.2 LBS | BODY MASS INDEX: 25.63 KG/M2 | HEIGHT: 72 IN | DIASTOLIC BLOOD PRESSURE: 70 MMHG | TEMPERATURE: 98.6 F | HEART RATE: 65 BPM

## 2025-06-24 DIAGNOSIS — C91.01 ACUTE LYMPHOBLASTIC LEUKEMIA (ALL) IN REMISSION (MULTI): ICD-10-CM

## 2025-06-24 DIAGNOSIS — F17.219 CIGARETTE NICOTINE DEPENDENCE WITH NICOTINE-INDUCED DISORDER: ICD-10-CM

## 2025-06-24 DIAGNOSIS — R91.8 LUNG NODULES: Primary | ICD-10-CM

## 2025-06-24 DIAGNOSIS — R91.1 LUNG NODULE: ICD-10-CM

## 2025-06-24 PROCEDURE — 99212 OFFICE O/P EST SF 10 MIN: CPT | Performed by: NURSE PRACTITIONER

## 2025-06-24 PROCEDURE — 99406 BEHAV CHNG SMOKING 3-10 MIN: CPT | Performed by: NURSE PRACTITIONER

## 2025-06-24 PROCEDURE — 99202 OFFICE O/P NEW SF 15 MIN: CPT | Performed by: NURSE PRACTITIONER

## 2025-06-24 PROCEDURE — 3008F BODY MASS INDEX DOCD: CPT | Performed by: NURSE PRACTITIONER

## 2025-06-24 RX ORDER — ACETAMINOPHEN 500 MG
125 TABLET ORAL DAILY
COMMUNITY

## 2025-06-24 SDOH — ECONOMIC STABILITY: FOOD INSECURITY: WITHIN THE PAST 12 MONTHS, YOU WORRIED THAT YOUR FOOD WOULD RUN OUT BEFORE YOU GOT MONEY TO BUY MORE.: NEVER TRUE

## 2025-06-24 SDOH — ECONOMIC STABILITY: FOOD INSECURITY: WITHIN THE PAST 12 MONTHS, THE FOOD YOU BOUGHT JUST DIDN'T LAST AND YOU DIDN'T HAVE MONEY TO GET MORE.: NEVER TRUE

## 2025-06-24 ASSESSMENT — LIFESTYLE VARIABLES
HOW MANY STANDARD DRINKS CONTAINING ALCOHOL DO YOU HAVE ON A TYPICAL DAY: PATIENT DOES NOT DRINK
SKIP TO QUESTIONS 9-10: 1
HOW OFTEN DO YOU HAVE A DRINK CONTAINING ALCOHOL: NEVER
AUDIT-C TOTAL SCORE: 0
HOW OFTEN DO YOU HAVE SIX OR MORE DRINKS ON ONE OCCASION: NEVER

## 2025-06-24 ASSESSMENT — COLUMBIA-SUICIDE SEVERITY RATING SCALE - C-SSRS
2. HAVE YOU ACTUALLY HAD ANY THOUGHTS OF KILLING YOURSELF?: NO
6. HAVE YOU EVER DONE ANYTHING, STARTED TO DO ANYTHING, OR PREPARED TO DO ANYTHING TO END YOUR LIFE?: NO
1. IN THE PAST MONTH, HAVE YOU WISHED YOU WERE DEAD OR WISHED YOU COULD GO TO SLEEP AND NOT WAKE UP?: NO

## 2025-06-24 ASSESSMENT — ENCOUNTER SYMPTOMS
DEPRESSION: 1
LOSS OF SENSATION IN FEET: 0
OCCASIONAL FEELINGS OF UNSTEADINESS: 0

## 2025-06-24 ASSESSMENT — PAIN SCALES - GENERAL: PAINLEVEL_OUTOF10: 0-NO PAIN

## 2025-06-24 NOTE — PATIENT INSTRUCTIONS
Stable lung nodules measuring up to 1 cm dating back to CT Chest, Abdomen and Pelvis with contrast 6/27/2023  History of leukemia  Recommend CT Chest 1 year.   He will be notified of results as they become available.   He states he will follow up with PCP re: PFT     Smoking cessation counseling provided.   He does have referral to smoking cessation and quit now.        [facility]  Lung Nodule Clinic      [email or web address]   [place], [place] [address]   Phone [phone]   Nurse Coordinator: [phone]   Fax [phone]        Pulmonary Referral Notification      NEW ORDER: Pulmonary Outpatient  [facility]  [email or web address]   [place]. [place] [address]  Phone: [phone]  Fax: [phone]    Please contact [facility] [phone]     Thank you, and we are here to support you with any additional care you may need.       Lung Nodule Clinic  [email or web address]  [address]  [place], [place] [address]  Phone [phone]  Fax [phone]  Nurse Coordinator [phone]                                          Welcome to the [facility] Lung Nodule Clinic    Today was the initial consult with the lung nodule clinic to determine proper recommendations for follow up. Your care is coordinated to ensure timely management.  As you know, early detection of cancer is very important.  Nodules that are large, look suspicious or have changed over time is why further evaluation such as the additional imaging test that we have ordered is needed. Our clinic will work closely with you in choosing the best next step.       What is my next step?  We will assist with scheduling scans, results reviews, and referrals for priority appointments.      Who do I call?  Your care coordinator for the lung nodule clinic can be contacted at [phone]  All scheduling needs can be assisted within the Cardiac Surgery/Thoracic Surgery/Lung Nodule Clinic offices at  [phone].              Table  Guanaco H, Juan DP, Shortyo WHIT, et al. Guidelines for Management of Incidental Pulmonary Nodules Detected on CT Images: From the Fleischner Society 2017. Radiology 2017;284:228-243.       Two left lower lobe nodules measuring up to 1 cm, unchanged compared  to CT 06/27/2023, likely benign. No new or enlarging pulmonary nodule  identified.

## 2025-06-24 NOTE — PROGRESS NOTES
Subjective   Patient ID: Hussain Villafana is a 34 y.o. male who presents for No chief complaint on file..  HPI 34-year-old male presents today for lung nodule clinic.    Smoking history: 0.5-1 ppd x 10 years   Personal history of cancer: Leukemia  Family history of lung cancer: No family history of lung cancer     Smoking cessation program and Quit now information provided.      CT chest without IV contrast dated 6/11/2025  Stable 0.4 cm left lower lobe nodule (series 203, image 208).  Stable subpleural left lower lobe nodule measuring 1 cm with central calcification (series 203, image 242).  Multiple juxtapleural nodules in the left lower lobe along the major  fissure measuring up to 0.3 cm are also unchanged.  No new or enlarging pulmonary nodule identified.      CT chest abdomen and pelvis with IV contrast dated 6/27/2023  Calcified subpleural nodule is seen in the left lower lobe measuring 9   mm in diameter on image 246 of series 204 which may indicate calcified   granuloma or hamartoma.  3 mm nodule is seen in the left lower lobe on image 205 of series 204.  4 mm nodule seen in the anterior aspect of   the left lower lobe on image 163.   Thoracic lymph nodes are not enlarged.     Review of Systems  Review of systems: Present-feeling well. Not present-chills, fatigue and fever.  Respiratory: Not present-difficulty breathing, cough, bloody sputum.  Cardiovascular: Not present-chest pain, palpitations, dyspnea on exertion.  Objective   There were no vitals taken for this visit.     Physical Exam  Gen.: Mental status-alert. Gen. appearance-cooperative, well groomed and consistent with stated age. Not in acute distress or sickly. Orientation-oriented to time, place, purpose and person. Build and nutrition-well-nourished and well-developed. Hydration-well-hydrated.    Integumentary: Color-normal coloration skin. Skin moisture-normal skin moisture.    Chest and lung exam: Auscultation-normal breath sounds, no  adventitious lung sounds and normal vocal resonance. Chest wall is normal in shape and non-tender.    Cardiovascular: Auscultation: Regular rate and rhythm. Heart sounds-normal heart sounds, S1-S2. No murmurs appreciated.   Assessment/Plan   Diagnoses and all orders for this visit:  Lung nodules  -     CT chest wo IV contrast; Future  Cigarette nicotine dependence with nicotine-induced disorder  -     CT chest wo IV contrast; Future  Acute lymphoblastic leukemia (ALL) in remission (Multi)  -     CT chest wo IV contrast; Future

## 2025-08-06 ENCOUNTER — APPOINTMENT (OUTPATIENT)
Dept: PRIMARY CARE | Facility: CLINIC | Age: 35
End: 2025-08-06
Payer: COMMERCIAL

## 2025-08-06 VITALS
OXYGEN SATURATION: 97 % | BODY MASS INDEX: 25.61 KG/M2 | DIASTOLIC BLOOD PRESSURE: 91 MMHG | TEMPERATURE: 97.3 F | WEIGHT: 188.8 LBS | SYSTOLIC BLOOD PRESSURE: 141 MMHG | HEART RATE: 62 BPM

## 2025-08-06 DIAGNOSIS — R91.1 LUNG NODULE: ICD-10-CM

## 2025-08-06 DIAGNOSIS — R07.89 ATYPICAL CHEST PAIN: Primary | ICD-10-CM

## 2025-08-06 DIAGNOSIS — E87.1 HYPONATREMIA: ICD-10-CM

## 2025-08-06 PROCEDURE — 99213 OFFICE O/P EST LOW 20 MIN: CPT | Performed by: STUDENT IN AN ORGANIZED HEALTH CARE EDUCATION/TRAINING PROGRAM

## 2025-08-06 ASSESSMENT — ENCOUNTER SYMPTOMS
DIARRHEA: 0
VOMITING: 0
LOSS OF SENSATION IN FEET: 0
HEADACHES: 0
DIFFICULTY URINATING: 0
CHILLS: 0
DIZZINESS: 0
ADENOPATHY: 0
FATIGUE: 0
COUGH: 0
CONSTIPATION: 0
SHORTNESS OF BREATH: 0
DEPRESSION: 0
FEVER: 0
ABDOMINAL PAIN: 0
WHEEZING: 0
COLOR CHANGE: 0
NAUSEA: 0
OCCASIONAL FEELINGS OF UNSTEADINESS: 0

## 2025-08-06 ASSESSMENT — PATIENT HEALTH QUESTIONNAIRE - PHQ9
1. LITTLE INTEREST OR PLEASURE IN DOING THINGS: NOT AT ALL
SUM OF ALL RESPONSES TO PHQ9 QUESTIONS 1 AND 2: 0
2. FEELING DOWN, DEPRESSED OR HOPELESS: NOT AT ALL

## 2025-08-06 NOTE — PROGRESS NOTES
[facility]  [address], [address]  [place] [address]    Subjective     Patient ID: Hussain Villafana is a 35 y.o. male who presents for  Follow-up     Patient following up for chest wall, chest pain issue. His labs were unremarkable, has chronic hyponatremia. EKG was normal. No changes in his sx. States due for stress echo next year at [facility].    He went to lung nodule clinic. He declined PFTs. States was told to quit smoking. He will have a repeat CT in 1 year.    He does not want his hyponatremia further worked up at this time. He states he will think about it.        Review of Systems   Constitutional:  Negative for chills, fatigue and fever.   HENT:  Negative for congestion.    Eyes:  Negative for visual disturbance.   Respiratory:  Negative for cough, shortness of breath and wheezing.    Cardiovascular:  Negative for chest pain.   Gastrointestinal:  Negative for abdominal pain, constipation, diarrhea, nausea and vomiting.   Genitourinary:  Negative for difficulty urinating.   Musculoskeletal:  Negative for gait problem.   Skin:  Negative for color change.   Neurological:  Negative for dizziness, syncope and headaches.   Hematological:  Negative for adenopathy.   All other systems reviewed and are negative.      Social History[1]  Family History[2]  RX Allergies[3]   Current Medications[4]    BP (!) 141/91 (BP Location: Right arm, Patient Position: Sitting, BP Cuff Size: Adult)   Pulse 62   Temp 36.3 °C (97.3 °F) (Temporal)   Wt 85.6 kg (188 lb 12.8 oz)   SpO2 97%   BMI 25.61 kg/m²      Objective   Physical Exam  Vitals reviewed.   Constitutional:       Appearance: Normal appearance.     Eyes:      Extraocular Movements: Extraocular movements intact.      Pupils: Pupils are equal, round, and reactive to light.       Cardiovascular:      Rate and Rhythm: Normal rate and regular rhythm.      Pulses: Normal pulses.      Heart sounds: Normal heart sounds.   Pulmonary:       "Effort: Pulmonary effort is normal.      Breath sounds: Normal breath sounds.     Musculoskeletal:         General: Normal range of motion.      Cervical back: Normal range of motion and neck supple.     Neurological:      General: No focal deficit present.      Mental Status: He is alert.     Psychiatric:         Mood and Affect: Mood normal.         Behavior: Behavior normal.           Labs:   Lab Results   Component Value Date    WBC 4.2 06/06/2025    HGB 15.5 06/06/2025    HCT 46.2 06/06/2025     06/06/2025    TSH 1.49 06/06/2025    INR 1.0 06/13/2024    GLUF 88 03/08/2018     Lab Results   Component Value Date     (L) 06/06/2025    K 4.8 06/06/2025    CL 98 06/06/2025    BUN 8 06/06/2025    CREATININE 1.08 06/06/2025    GLUCOSE 98 06/06/2025    CALCIUM 10.4 (H) 06/06/2025    PROT 7.8 06/06/2025    BILITOT 0.6 06/06/2025    ALKPHOS 105 06/06/2025    AST 30 06/06/2025    ALT 28 06/06/2025     Lab Results   Component Value Date    CHOL 175 06/06/2025    CHOL 176 01/28/2024    CHOL 188 07/18/2023      Lab Results   Component Value Date    TRIG 58 06/06/2025    TRIG 86 01/28/2024    TRIG 55 07/18/2023      Lab Results   Component Value Date    HDL 74 06/06/2025    HDL 76.9 01/28/2024    HDL 78.1 07/18/2023     Lab Results   Component Value Date    LDLCALC 87 06/06/2025    LDLCALC 82 01/28/2024      Lab Results   Component Value Date    VLDL 17 01/28/2024    VLDL 11 07/18/2023    VLDL 20 10/24/2020    No components found for: \"CHOLHDLRATI0\"    No data recorded    Imaging/Testing: ECG 12 lead (Ancillary Performed)  Normal sinus rhythm  Normal ECG  When compared with ECG of 13-JUN-2024 19:35,  No significant change was found  Confirmed by Jose Elise ([id number]) on 6/15/2025 4:37:07 PM    Assessment/Plan   Problem List Items Addressed This Visit       Atypical chest pain - Primary    Lung nodule     Other Visit Diagnoses         Hyponatremia              Chest pain  - chronic stable issue, subsequent " encounter  - work up has been negative thus far  - offered referral cardiology, pt declined    Lung nodule  - continue lung nodule clinic follow up  - pt declined PFT  - CT scan in 1 year per lung nodule clinic    Hyponatremia  - chronic stable issue  - 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  Advised pt if he would like to continue work up he can follow up to discuss, otherwise pt declined further work up and management of the above problems. We can put in further labs for the sodium if he decides, can call for this and then schedule an appt to go over results. Follow up prn or yearly.         LOUIE TOVAR MD, [facility]  Department of [facility]         [1]   Social History  Tobacco Use    Smoking status: Every Day     Types: Cigarettes    Smokeless tobacco: Current    Tobacco comments:     occassionally   Vaping Use    Vaping status: Former    Substances: Nicotine   Substance Use Topics    Alcohol use: Not Currently    Drug use: Never   [2]   Family History  Problem Relation Name Age of Onset    Seizures Maternal Grandmother Anabell Lunsford    [3] No Known Allergies  [4]   Current Outpatient Medications   Medication Sig Dispense Refill    benztropine (Cogentin) 1 mg tablet       cholecalciferol (Vitamin D3) 125 mcg (5,000 units) tablet Take 1 tablet (125 mcg) by mouth once daily.      haloperidol (Haldol) 5 mg tablet       haloperidol decanoate (Haldol Decanoate) 100 mg/mL injection       lisdexamfetamine (Vyvanse) 20 mg capsule Take 1 capsule (20 mg) by mouth once daily in the morning. 30 capsule 0    Stool Softener 100 mg capsule Take 1 capsule (100 mg) by mouth 2 times a day as needed.      traZODone (Desyrel) 100 mg tablet Take 1 tablet (100 mg) by mouth if  needed for sleep. Once a week      risperiDONE (RisperDAL) 2 mg tablet TAKE 1 TABLET (2 MG) BY MOUTH 2 TIMES A DAY. PNC (Patient not taking: Reported on 6/24/2025) 180 tablet 0    topiramate (Topamax) 200 mg tablet Take 1 tablet (200 mg) by mouth once daily. 90 tablet 0     No current facility-administered medications for this visit.

## 2025-08-28 ENCOUNTER — APPOINTMENT (OUTPATIENT)
Dept: PRIMARY CARE | Facility: CLINIC | Age: 35
End: 2025-08-28
Payer: COMMERCIAL